# Patient Record
Sex: FEMALE | Race: BLACK OR AFRICAN AMERICAN | Employment: OTHER | URBAN - METROPOLITAN AREA
[De-identification: names, ages, dates, MRNs, and addresses within clinical notes are randomized per-mention and may not be internally consistent; named-entity substitution may affect disease eponyms.]

---

## 2013-08-30 LAB — COLONOSCOPY, EXTERNAL: NORMAL

## 2017-05-10 ENCOUNTER — OP HISTORICAL/CONVERTED ENCOUNTER (OUTPATIENT)
Dept: OTHER | Age: 82
End: 2017-05-10

## 2017-10-12 ENCOUNTER — OP HISTORICAL/CONVERTED ENCOUNTER (OUTPATIENT)
Dept: OTHER | Age: 82
End: 2017-10-12

## 2017-10-13 ENCOUNTER — OP HISTORICAL/CONVERTED ENCOUNTER (OUTPATIENT)
Dept: OTHER | Age: 82
End: 2017-10-13

## 2017-10-20 ENCOUNTER — OP HISTORICAL/CONVERTED ENCOUNTER (OUTPATIENT)
Dept: OTHER | Age: 82
End: 2017-10-20

## 2017-11-10 ENCOUNTER — OP HISTORICAL/CONVERTED ENCOUNTER (OUTPATIENT)
Dept: OTHER | Age: 82
End: 2017-11-10

## 2017-12-15 ENCOUNTER — OP HISTORICAL/CONVERTED ENCOUNTER (OUTPATIENT)
Dept: OTHER | Age: 82
End: 2017-12-15

## 2018-02-09 ENCOUNTER — OP HISTORICAL/CONVERTED ENCOUNTER (OUTPATIENT)
Dept: OTHER | Age: 83
End: 2018-02-09

## 2018-06-04 ENCOUNTER — OP HISTORICAL/CONVERTED ENCOUNTER (OUTPATIENT)
Dept: OTHER | Age: 83
End: 2018-06-04

## 2018-09-25 ENCOUNTER — OP HISTORICAL/CONVERTED ENCOUNTER (OUTPATIENT)
Dept: OTHER | Age: 83
End: 2018-09-25

## 2018-09-28 ENCOUNTER — OP HISTORICAL/CONVERTED ENCOUNTER (OUTPATIENT)
Dept: OTHER | Age: 83
End: 2018-09-28

## 2018-10-15 ENCOUNTER — OP HISTORICAL/CONVERTED ENCOUNTER (OUTPATIENT)
Dept: OTHER | Age: 83
End: 2018-10-15

## 2018-10-15 LAB — MAMMOGRAPHY, EXTERNAL: NORMAL

## 2019-10-16 ENCOUNTER — OP HISTORICAL/CONVERTED ENCOUNTER (OUTPATIENT)
Dept: OTHER | Age: 84
End: 2019-10-16

## 2019-12-02 ENCOUNTER — ED HISTORICAL/CONVERTED ENCOUNTER (OUTPATIENT)
Dept: OTHER | Age: 84
End: 2019-12-02

## 2020-09-12 DIAGNOSIS — Z12.31 OTHER SCREENING MAMMOGRAM: ICD-10-CM

## 2020-09-21 ENCOUNTER — HOSPITAL ENCOUNTER (OUTPATIENT)
Dept: MAMMOGRAPHY | Age: 85
Discharge: HOME | End: 2020-09-21
Attending: INTERNAL MEDICINE
Payer: MEDICARE

## 2020-09-21 PROCEDURE — 77067 SCR MAMMO BI INCL CAD: CPT

## 2021-02-20 ENCOUNTER — IMMUNIZATION (OUTPATIENT)
Dept: INTERNAL MEDICINE CLINIC | Age: 86
End: 2021-02-20
Payer: MEDICARE

## 2021-02-20 DIAGNOSIS — Z23 ENCOUNTER FOR IMMUNIZATION: Primary | ICD-10-CM

## 2021-02-20 PROCEDURE — 91300 COVID-19, MRNA, LNP-S, PF, 30MCG/0.3ML DOSE(PFIZER): CPT | Performed by: FAMILY MEDICINE

## 2021-02-20 PROCEDURE — 0001A COVID-19, MRNA, LNP-S, PF, 30MCG/0.3ML DOSE(PFIZER): CPT | Performed by: FAMILY MEDICINE

## 2021-03-13 ENCOUNTER — IMMUNIZATION (OUTPATIENT)
Dept: INTERNAL MEDICINE CLINIC | Age: 86
End: 2021-03-13
Payer: MEDICARE

## 2021-03-13 DIAGNOSIS — Z23 ENCOUNTER FOR IMMUNIZATION: Primary | ICD-10-CM

## 2021-03-13 PROCEDURE — 91300 COVID-19, MRNA, LNP-S, PF, 30MCG/0.3ML DOSE(PFIZER): CPT | Performed by: FAMILY MEDICINE

## 2021-03-13 PROCEDURE — 0002A COVID-19, MRNA, LNP-S, PF, 30MCG/0.3ML DOSE(PFIZER): CPT | Performed by: FAMILY MEDICINE

## 2021-03-17 ENCOUNTER — HOSPITAL ENCOUNTER (OUTPATIENT)
Dept: GENERAL RADIOLOGY | Age: 86
Discharge: HOME | End: 2021-03-17
Payer: MEDICARE

## 2021-03-17 ENCOUNTER — TRANSCRIBE ORDER (OUTPATIENT)
Dept: GENERAL RADIOLOGY | Age: 86
End: 2021-03-17

## 2021-03-17 DIAGNOSIS — M51.36 DEGENERATION, INTERVERTEBRAL DISC, LUMBAR: Primary | ICD-10-CM

## 2021-03-17 DIAGNOSIS — M51.36 DEGENERATION, INTERVERTEBRAL DISC, LUMBAR: ICD-10-CM

## 2021-03-17 PROCEDURE — 72100 X-RAY EXAM L-S SPINE 2/3 VWS: CPT

## 2021-08-04 ENCOUNTER — TRANSCRIBE ORDER (OUTPATIENT)
Dept: SCHEDULING | Age: 86
End: 2021-08-04

## 2021-08-04 DIAGNOSIS — R63.4 LOSS OF WEIGHT: ICD-10-CM

## 2021-08-04 DIAGNOSIS — D47.2 MONOCLONAL PARAPROTEINEMIA: Primary | ICD-10-CM

## 2021-10-27 ENCOUNTER — TRANSCRIBE ORDER (OUTPATIENT)
Dept: SCHEDULING | Age: 86
End: 2021-10-27

## 2021-10-27 DIAGNOSIS — R63.4 LOSS OF WEIGHT: ICD-10-CM

## 2021-10-27 DIAGNOSIS — D47.2 MONOCLONAL PARAPROTEINEMIA: Primary | ICD-10-CM

## 2021-11-02 ENCOUNTER — TRANSCRIBE ORDER (OUTPATIENT)
Dept: SCHEDULING | Age: 86
End: 2021-11-02

## 2021-11-02 DIAGNOSIS — R63.4 LOSS OF WEIGHT: ICD-10-CM

## 2021-11-02 DIAGNOSIS — D47.2 MONOCLONAL PARAPROTEINEMIA: Primary | ICD-10-CM

## 2021-11-02 DIAGNOSIS — Z85.3 PERSONAL HISTORY OF BREAST CANCER: ICD-10-CM

## 2021-11-03 ENCOUNTER — TRANSCRIBE ORDER (OUTPATIENT)
Dept: SCHEDULING | Age: 86
End: 2021-11-03

## 2021-11-03 DIAGNOSIS — R63.4 LOSS OF WEIGHT: Primary | ICD-10-CM

## 2021-11-03 DIAGNOSIS — Z85.3 PERSONAL HISTORY OF MALIGNANT NEOPLASM OF BREAST: ICD-10-CM

## 2022-01-24 ENCOUNTER — TRANSCRIBE ORDER (OUTPATIENT)
Dept: SCHEDULING | Age: 87
End: 2022-01-24

## 2022-01-24 DIAGNOSIS — R63.4 LOSS OF WEIGHT: Primary | ICD-10-CM

## 2022-01-24 DIAGNOSIS — Z85.3 PERSONAL HISTORY OF MALIGNANT NEOPLASM OF BREAST: ICD-10-CM

## 2022-01-24 DIAGNOSIS — D47.2 MONOCLONAL PARAPROTEINEMIA: ICD-10-CM

## 2022-01-28 ENCOUNTER — TRANSCRIBE ORDER (OUTPATIENT)
Dept: SCHEDULING | Age: 87
End: 2022-01-28

## 2022-01-28 DIAGNOSIS — D47.2 MONOCLONAL PARAPROTEINEMIA: ICD-10-CM

## 2022-01-28 DIAGNOSIS — R63.4 LOSS OF WEIGHT: Primary | ICD-10-CM

## 2022-01-28 DIAGNOSIS — Z85.3 PERSONAL HISTORY OF MALIGNANT NEOPLASM OF BREAST: ICD-10-CM

## 2022-02-02 ENCOUNTER — HOSPITAL ENCOUNTER (OUTPATIENT)
Dept: CT IMAGING | Age: 87
Discharge: HOME | End: 2022-02-02
Attending: INTERNAL MEDICINE
Payer: MEDICARE

## 2022-02-02 DIAGNOSIS — Z85.3 PERSONAL HISTORY OF MALIGNANT NEOPLASM OF BREAST: ICD-10-CM

## 2022-02-02 DIAGNOSIS — D47.2 MONOCLONAL PARAPROTEINEMIA: ICD-10-CM

## 2022-02-02 DIAGNOSIS — R63.4 LOSS OF WEIGHT: ICD-10-CM

## 2022-02-02 PROCEDURE — 74176 CT ABD & PELVIS W/O CONTRAST: CPT

## 2022-02-02 PROCEDURE — 71250 CT THORAX DX C-: CPT

## 2022-09-23 LAB
CREATININE, EXTERNAL: 0.91
HBA1C MFR BLD HPLC: 5.8 %
LDL-C, EXTERNAL: 95
TOTAL CHOLESTEROL, NCHOLT: 170

## 2023-02-15 DIAGNOSIS — S82.434D CLOSED NONDISPLACED OBLIQUE FRACTURE OF SHAFT OF RIGHT FIBULA WITH ROUTINE HEALING, SUBSEQUENT ENCOUNTER: ICD-10-CM

## 2023-02-15 DIAGNOSIS — M54.31 SCIATICA OF RIGHT SIDE: ICD-10-CM

## 2023-02-15 DIAGNOSIS — E05.90 HYPERTHYROIDISM: ICD-10-CM

## 2023-02-15 DIAGNOSIS — D47.2 SMOLDERING MULTIPLE MYELOMA: ICD-10-CM

## 2023-02-15 DIAGNOSIS — D64.9 MILD ANEMIA: ICD-10-CM

## 2023-02-15 DIAGNOSIS — I10 BENIGN ESSENTIAL HYPERTENSION: Primary | ICD-10-CM

## 2023-02-15 PROBLEM — C50.919 BREAST CANCER (HCC): Status: ACTIVE | Noted: 2023-02-15

## 2023-02-15 RX ORDER — ASPIRIN 81 MG/1
81 TABLET ORAL DAILY
COMMUNITY

## 2023-02-15 RX ORDER — SIMVASTATIN 10 MG/1
10 TABLET, FILM COATED ORAL
COMMUNITY

## 2023-02-15 RX ORDER — MULTIVIT WITH MINERALS/HERBS
1 TABLET ORAL DAILY
COMMUNITY

## 2023-02-15 RX ORDER — LEVOTHYROXINE SODIUM 175 UG/1
175 TABLET ORAL
COMMUNITY

## 2023-02-15 RX ORDER — DORZOLAMIDE HCL 20 MG/ML
2 SOLUTION/ DROPS OPHTHALMIC DAILY
COMMUNITY

## 2023-02-15 RX ORDER — IRBESARTAN AND HYDROCHLOROTHIAZIDE 150; 12.5 MG/1; MG/1
1 TABLET, FILM COATED ORAL DAILY
COMMUNITY

## 2023-02-15 RX ORDER — TRAVOPROST OPHTHALMIC SOLUTION 0.04 MG/ML
1 SOLUTION OPHTHALMIC EVERY EVENING
COMMUNITY

## 2023-02-15 RX ORDER — DIPHENHYDRAMINE HCL 25 MG
25 TABLET ORAL AS NEEDED
COMMUNITY

## 2023-02-15 RX ORDER — FERROUS SULFATE 324(65)MG
324 TABLET, DELAYED RELEASE (ENTERIC COATED) ORAL
COMMUNITY

## 2023-02-15 RX ORDER — AMLODIPINE BESYLATE 5 MG/1
5 TABLET ORAL DAILY
COMMUNITY

## 2023-02-15 RX ORDER — TIMOLOL MALEATE 6.8 MG/ML
1 SOLUTION/ DROPS OPHTHALMIC 2 TIMES DAILY
COMMUNITY

## 2023-02-15 RX ORDER — CLONIDINE HYDROCHLORIDE 0.1 MG/1
0.1 TABLET ORAL
COMMUNITY

## 2023-02-15 RX ORDER — DOCUSATE SODIUM 100 MG/1
100 CAPSULE, LIQUID FILLED ORAL AS NEEDED
COMMUNITY

## 2023-02-15 RX ORDER — ASCORBIC ACID 500 MG
500 TABLET ORAL DAILY
COMMUNITY

## 2023-02-15 RX ORDER — LIDOCAINE 50 MG/G
1 PATCH TOPICAL EVERY 12 HOURS
COMMUNITY

## 2023-02-15 RX ORDER — GABAPENTIN 300 MG/1
300 CAPSULE ORAL
COMMUNITY

## 2023-02-15 RX ORDER — CALCIUM CARBONATE 600 MG
600 TABLET ORAL 2 TIMES DAILY
COMMUNITY

## 2023-02-15 RX ORDER — ACETAMINOPHEN 325 MG/1
325 TABLET ORAL AS NEEDED
COMMUNITY

## 2023-04-01 DIAGNOSIS — R79.89 ELEVATED SERUM CREATININE: Primary | ICD-10-CM

## 2023-04-01 DIAGNOSIS — E03.9 HYPOTHYROIDISM, ADULT: ICD-10-CM

## 2023-04-01 DIAGNOSIS — K59.00 CONSTIPATION IN FEMALE: ICD-10-CM

## 2023-04-01 DIAGNOSIS — E78.00 HYPERCHOLESTEREMIA: ICD-10-CM

## 2023-04-01 DIAGNOSIS — R73.09 ELEVATED GLUCOSE: ICD-10-CM

## 2023-04-01 RX ORDER — ACETAMINOPHEN 500 MG
2000 TABLET ORAL DAILY
COMMUNITY

## 2023-04-04 ENCOUNTER — OFFICE VISIT (OUTPATIENT)
Dept: INTERNAL MEDICINE CLINIC | Age: 88
End: 2023-04-04
Payer: MEDICARE

## 2023-04-04 PROCEDURE — G8427 DOCREV CUR MEDS BY ELIG CLIN: HCPCS | Performed by: INTERNAL MEDICINE

## 2023-04-04 PROCEDURE — G8420 CALC BMI NORM PARAMETERS: HCPCS | Performed by: INTERNAL MEDICINE

## 2023-04-04 PROCEDURE — 1123F ACP DISCUSS/DSCN MKR DOCD: CPT | Performed by: INTERNAL MEDICINE

## 2023-04-04 PROCEDURE — 99214 OFFICE O/P EST MOD 30 MIN: CPT | Performed by: INTERNAL MEDICINE

## 2023-04-04 PROCEDURE — G8536 NO DOC ELDER MAL SCRN: HCPCS | Performed by: INTERNAL MEDICINE

## 2023-04-04 PROCEDURE — 1101F PT FALLS ASSESS-DOCD LE1/YR: CPT | Performed by: INTERNAL MEDICINE

## 2023-04-04 PROCEDURE — G8510 SCR DEP NEG, NO PLAN REQD: HCPCS | Performed by: INTERNAL MEDICINE

## 2023-04-04 PROCEDURE — 1090F PRES/ABSN URINE INCON ASSESS: CPT | Performed by: INTERNAL MEDICINE

## 2023-04-04 NOTE — PROGRESS NOTES
[address] [facility] Internal Medicine  [address]  [place], [address] [facility]  Phone: [phone]      Xavier Ernst (: 1931) is a 80 y.o. female, established patient, here for evaluation of the following chief complaint(s):  Follow Up Chronic Condition         SUBJECTIVE/OBJECTIVE:  HPI:  Patient is here for follow up, she did not have routine blood work for this visit. She states that she is doing well and she needs refills on her medicines sent to Mail order. She has her grandson Nida Newell and her daughter Brad Rojas with her today. Just informed she is trying to get some help at home for his mother and grandmother from the [address] and they will come for an evaluation tomorrow. Patient denies any falls. She stated she is taking all the medications but did not bring the medicines today. She states that she has not seen  recently. Prior to Admission medications    Medication Sig Start Date End Date Taking? Authorizing Provider   cholecalciferol (VITAMIN D3) (2,000 UNITS /50 MCG) cap capsule Take 2,000 Units by mouth daily. Yes Provider, Historical   polyvinyl alcohol-povidon,PF, (REFRESH CLASSIC) 1.4-0.6 % ophthalmic solution Administer 1-2 Drops to both eyes as needed. Yes Provider, Historical   cloNIDine HCL (CATAPRES) 0.1 mg tablet Take 0.1 mg by mouth nightly. Yes Provider, Historical   aspirin delayed-release 81 mg tablet Take 81 mg by mouth daily. Yes Provider, Historical   calcium carbonate (CALTREX) 600 mg calcium (1,500 mg) tablet Take 600 mg by mouth two (2) times a day. Yes Provider, Historical   amLODIPine (NORVASC) 5 mg tablet Take 5 mg by mouth daily. Yes Provider, Historical   lidocaine (LIDODERM) 5 % 1 Patch by TransDERmal route every twelve (12) hours. Apply patch to the affected area for 12 hours a day and remove for 12 hours a day.    Yes Provider, Historical   levothyroxine (SYNTHROID) 175 mcg tablet Take 175 mcg by mouth Daily (before breakfast). Yes Provider, Historical   irbesartan-hydroCHLOROthiazide (AVALIDE) 150-12.5 mg per tablet Take 1 Tablet by mouth daily. Yes Provider, Historical   travoprost (TRAVATAN Z) 0.004 % ophthalmic solution Administer 1 Drop to both eyes every evening. Yes Provider, Historical   b complex vitamins tablet Take 1 Tablet by mouth daily. Yes Provider, Historical   timoloL maleate 0.5 % drpd ophthalmic solution Administer 1 Drop to left eye two (2) times a day. Yes Provider, Historical   gabapentin (NEURONTIN) 300 mg capsule Take 300 mg by mouth nightly. Yes Provider, Historical   acetaminophen (TYLENOL) 325 mg tablet Take 325 mg by mouth as needed for Pain. Yes Provider, Historical   ascorbic acid, vitamin C, (VITAMIN C) 500 mg tablet Take 500 mg by mouth daily. Yes Provider, Historical   vit C/E/Zn/coppr/lutein/zeaxan (PRESERVISION AREDS-2 PO) Take  by mouth daily. Yes Provider, Historical   dorzolamide (TRUSOPT) 2 % ophthalmic solution Administer 2 Drops to both eyes daily. 1 drop in 1 eye daily and 2 drops in other eye twice a day   Yes Provider, Historical   POTASSIUM-99 PO Take 90 mg by mouth daily. Yes Provider, Historical   simvastatin (ZOCOR) 10 mg tablet Take 10 mg by mouth. 1 tablet Monday, Wednesday, and Friday   Yes Provider, Historical   ferrous sulfate 324 mg (65 mg iron) tablet Take 324 mg by mouth Daily (before breakfast). Yes Provider, Historical   docusate sodium (COLACE) 100 mg capsule Take 100 mg by mouth as needed for Constipation. Yes Provider, Historical   diphenhydrAMINE (BENADRYL) 25 mg tablet Take 25 mg by mouth as needed.    Yes Provider, Historical   furosemide (LASIX) 20 mg tablet TAKE 1 TABLET BY MOUTH  DAILY 2/4/23  Yes Vicki Martinez MD        Allergies   Allergen Reactions    Seafood Hives        Past Medical History:   Diagnosis Date    Benign essential hypertension     Breast cancer ([facility] [address]     left side-2005    Closed nondisplaced oblique fracture of shaft of right fibula with routine healing, subsequent encounter     Constipation in female     Elevated glucose     Elevated serum creatinine     Hypercholesteremia     Hyperthyroidism     Hypothyroidism, adult     Mild anemia     Sciatica of right side     Smoldering multiple myeloma         Family History   Problem Relation Age of Onset    Stroke Father     Heart Attack Maternal Grandmother     Cancer Maternal Grandfather         bone    Colon Cancer Son         Past Surgical History:   Procedure Laterality Date    HX CYST REMOVAL      2/2004    HX HYSTERECTOMY      age 76    HX MASTECTOMY Left     2005    IR BONE MARROW DIAGNOSTIC ASP  12/15/2017    Dr. Opal Woods       Review of Systems  Constitutional:  Negative for chills and fever. HENT:  Negative for congestion, ear pain, nosebleeds, sinus pain, sore throat and tinnitus. Eyes:  Negative for redness. Respiratory:  Negative for cough and shortness of breath. Cardiovascular:  Negative for chest pain and palpitations. Gastrointestinal:  Negative for abdominal pain, diarrhea, nausea and vomiting. Endocrine: Negative for cold intolerance and polyuria. Genitourinary:  Negative for dysuria and hematuria. Musculoskeletal:  Negative for back pain and neck pain. Skin:  Negative for rash. Neurological:  Negative for dizziness and headaches. Psychiatric/Behavioral: Negative. BP (!) 180/100   Pulse 66   Temp 97.1 °F (36.2 °C) (Temporal)   Ht 5' 7\" (1.702 m)   Wt 131 lb (59.4 kg)   SpO2 99%   BMI 20.52 kg/m²      Physical Exam  Constitutional:       Appearance: Patient is with her daughter Luna Ortiz and grandson Greyson Grayson. HENT:      Head: Normocephalic and atraumatic. Right Ear: External ear normal.      Left Ear: External ear normal.      Nose: Nose normal.      Mouth/Throat:      Mouth: Mucous membranes are moist.   Eyes:      Extraocular Movements: Extraocular movements intact. Pupils: Pupils are equal, round, and reactive to light. Cardiovascular:      Rate and Rhythm: Normal rate and regular rhythm. Pulmonary:      Effort: Pulmonary effort is normal.      Breath sounds: Normal breath sounds. Abdominal:      Palpations: Abdomen is soft. Tenderness: There is no abdominal tenderness. Musculoskeletal:      Cervical back: Normal range of motion and neck supple. Right lower leg: Nonpitting edema. Left lower leg: No edema. Skin:     General: Skin is warm and dry. Neurological:      General: No focal deficit present. Mental Status: She is alert and oriented to person, place, and time. Psychiatric:         Mood and Affect: Mood normal.    ASSESSMENT/PLAN:  Below is the assessment and plan developed based on review of pertinent history, physical exam, labs, studies, and medications. 1. Hypothyroidism, adult  Comments:  TSH was high at 85 in July 22, after adjusting the medication TSH was 0.011 in September 22 and 0.048 in November 22. Advised to do blood test and new lab order given and just informed he will assist with the blood test.  Advised to continue levothyroxine and  will adjust the medication if needed after the blood test.  Orders:  -     TSH 3RD GENERATION; Future  -     TSH 3RD GENERATION; Future  2. Hypercholesteremia  Comments:  Cholesterol was  controlled and LFTs were normal in September 22, advised to continue low-fat diet simvastatin and will recheck labs  Orders:  -     LIPID PANEL; Future  -     METABOLIC PANEL, COMPREHENSIVE; Future  -     LIPID PANEL; Future  3. Elevated serum creatinine  Comments:  Creatinine was normal at 0.91 in September 22, advised to keep fluid intake, to avoid Aleve, Motrin, naproxen  Orders:  -     METABOLIC PANEL, COMPREHENSIVE; Future  4. Hyperthyroidism  Comments:  TSH was low at 0.011 in September 22 and 0.0 418 #22 likely to replacement, levothyroxine dose has been adjusted, will recheck labs  5.  Mild anemia  Comments:  Has  history of monoclonal gammopathy with smoldering myeloma, advised to follow-up with . 6. Essential hypertension  Comments:  Blood pressure is elevated at the office, patient states usually her blood pressure is fair at home and when she comes to doctor's office it goes up. Advised low-sodium diet, to to call with the name of the medications what he is she is taking and will adjust the medication. 7. Smoldering multiple myeloma  Comments:  Advised to keep follow-up with Juan Guevara in about 4 months (around 8/4/2023). There are no Patient Instructions on file for this visit. Health Maintenance Due   Topic Date Due    Shingles Vaccine (1 of 2) Never done    DTaP/Tdap/Td series (1 - Tdap) Never done    Bone Densitometry (Dexa) Screening  Never done    Medicare Yearly Exam  Never done        Aspects of this note may have been generated using voice recognition software. Despite editing, there may be unrecognized errors. An electronic signature was used to authenticate this note.   -- Parmjit Ag MD

## 2023-04-04 NOTE — PROGRESS NOTES
Chief Complaint   Patient presents with    Follow Up Chronic Condition     1. \"Have you been to the ER, urgent care clinic since your last visit? Hospitalized since your last visit? \" No    2. \"Have you seen or consulted any other health care providers outside of the [address] [place] [address] since your last visit? \" No     3. For patients aged 39-70: Has the patient had a colonoscopy / FIT/ Cologuard? NA - based on age      If the patient is female:    4. For patients aged 41-77: Has the patient had a mammogram within the past 2 years? NA - based on age or sex      11. For patients aged 21-65: Has the patient had a pap smear?  NA - based on age or sex

## 2023-04-23 DIAGNOSIS — E78.00 HYPERCHOLESTEREMIA: Primary | ICD-10-CM

## 2023-04-24 DIAGNOSIS — E78.00 HYPERCHOLESTEREMIA: Primary | ICD-10-CM

## 2023-04-24 DIAGNOSIS — E03.9 HYPOTHYROIDISM, ADULT: Primary | ICD-10-CM

## 2023-04-27 RX ORDER — CLONIDINE HYDROCHLORIDE 0.1 MG/1
0.1 TABLET ORAL
Qty: 90 TABLET | Refills: 0 | Status: SHIPPED | OUTPATIENT
Start: 2023-04-27

## 2023-04-27 RX ORDER — AMLODIPINE BESYLATE 5 MG/1
5 TABLET ORAL DAILY
Qty: 90 TABLET | Refills: 0 | Status: SHIPPED | OUTPATIENT
Start: 2023-04-27

## 2023-04-27 RX ORDER — SIMVASTATIN 10 MG/1
10 TABLET, FILM COATED ORAL EVERY OTHER DAY
Qty: 90 TABLET | Refills: 0 | Status: SHIPPED | OUTPATIENT
Start: 2023-04-27

## 2023-04-27 RX ORDER — IRBESARTAN AND HYDROCHLOROTHIAZIDE 150; 12.5 MG/1; MG/1
1 TABLET, FILM COATED ORAL DAILY
Qty: 90 TABLET | Refills: 0 | Status: SHIPPED | OUTPATIENT
Start: 2023-04-27

## 2023-06-20 ENCOUNTER — HOSPITAL ENCOUNTER (OUTPATIENT)
Facility: HOSPITAL | Age: 88
Discharge: HOME | End: 2023-06-23
Attending: INTERNAL MEDICINE
Payer: MEDICARE

## 2023-06-20 DIAGNOSIS — R63.4 LOSS OF WEIGHT: ICD-10-CM

## 2023-06-20 DIAGNOSIS — Z85.3 PERSONAL HISTORY OF MALIGNANT NEOPLASM OF BREAST: ICD-10-CM

## 2023-06-20 DIAGNOSIS — D47.2 MONOCLONAL PARAPROTEINEMIA: ICD-10-CM

## 2023-06-20 PROCEDURE — 74176 CT ABD & PELVIS W/O CONTRAST: CPT

## 2023-06-28 RX ORDER — AMLODIPINE BESYLATE 5 MG/1
5 TABLET ORAL DAILY
Qty: 90 TABLET | Refills: 1 | Status: ON HOLD | OUTPATIENT
Start: 2023-06-28 | End: 2023-08-22 | Stop reason: SDUPTHER

## 2023-06-28 RX ORDER — CLONIDINE HYDROCHLORIDE 0.1 MG/1
TABLET ORAL
Qty: 90 TABLET | Refills: 1 | Status: SHIPPED | OUTPATIENT
Start: 2023-06-28

## 2023-06-28 RX ORDER — IRBESARTAN AND HYDROCHLOROTHIAZIDE 150; 12.5 MG/1; MG/1
1 TABLET, FILM COATED ORAL DAILY
Qty: 90 TABLET | Refills: 1 | Status: ON HOLD | OUTPATIENT
Start: 2023-06-28 | End: 2023-08-22 | Stop reason: HOSPADM

## 2023-07-05 ENCOUNTER — TELEPHONE (OUTPATIENT)
Facility: CLINIC | Age: 88
End: 2023-07-05

## 2023-07-05 NOTE — TELEPHONE ENCOUNTER
Need advice on next steps to address fainting/dizziness spells. My grandmother started feeling shaky this afternoon and fell coming down the living room steps. We are giving her fluids and monitoring her blood pressure but would like an appointment in the morning if possible. She has also mentioned \"blacking out\" and feeling like the halllway was crashing in on her in the past week.

## 2023-07-05 NOTE — TELEPHONE ENCOUNTER
----- Message from Richard Martinez sent at 7/5/2023  5:41 PM EDT -----  Regarding: Fainting spells  Contact: [phone]  Clarifying that the prior message was sent on behalf of Sandra Patino by her granddaughter, Caio Barraza. Was asked by my cousin Parisa Marie) to send this note so Dr. Luke Wylie is aware. Thank you.

## 2023-07-05 NOTE — TELEPHONE ENCOUNTER
----- Message from Efrain Izquierdo sent at 7/5/2023  5:41 PM EDT -----  Regarding: Fainting spells  Contact: [phone]  Clarifying that the prior message was sent on behalf of [employer] by her granddaughter, Elva Figueredo. Was asked by my cousin Mallory Trevizo) to send this note so Dr. Georgia Ribera is aware. Thank you.

## 2023-07-06 ENCOUNTER — OFFICE VISIT (OUTPATIENT)
Facility: CLINIC | Age: 88
End: 2023-07-06
Payer: MEDICARE

## 2023-07-06 VITALS
HEART RATE: 71 BPM | TEMPERATURE: 97.6 F | OXYGEN SATURATION: 99 % | WEIGHT: 124 LBS | DIASTOLIC BLOOD PRESSURE: 90 MMHG | HEIGHT: 66 IN | SYSTOLIC BLOOD PRESSURE: 158 MMHG | BODY MASS INDEX: 19.93 KG/M2

## 2023-07-06 DIAGNOSIS — Z91.81 AT HIGH RISK FOR FALLS: ICD-10-CM

## 2023-07-06 DIAGNOSIS — E03.9 HYPOTHYROIDISM, ADULT: ICD-10-CM

## 2023-07-06 DIAGNOSIS — R55 PRE-SYNCOPE: Primary | ICD-10-CM

## 2023-07-06 DIAGNOSIS — L30.9 DERMATITIS: ICD-10-CM
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
## 2023-07-06 DIAGNOSIS — I10 ESSENTIAL HYPERTENSION, BENIGN: ICD-10-CM

## 2023-07-06 PROCEDURE — 1036F TOBACCO NON-USER: CPT | Performed by: INTERNAL MEDICINE

## 2023-07-06 PROCEDURE — 1123F ACP DISCUSS/DSCN MKR DOCD: CPT | Performed by: INTERNAL MEDICINE

## 2023-07-06 PROCEDURE — 93880 EXTRACRANIAL BILAT STUDY: CPT | Performed by: INTERNAL MEDICINE

## 2023-07-06 PROCEDURE — G8420 CALC BMI NORM PARAMETERS: HCPCS | Performed by: INTERNAL MEDICINE

## 2023-07-06 PROCEDURE — G8427 DOCREV CUR MEDS BY ELIG CLIN: HCPCS | Performed by: INTERNAL MEDICINE

## 2023-07-06 PROCEDURE — 1090F PRES/ABSN URINE INCON ASSESS: CPT | Performed by: INTERNAL MEDICINE

## 2023-07-06 PROCEDURE — 99214 OFFICE O/P EST MOD 30 MIN: CPT | Performed by: INTERNAL MEDICINE

## 2023-07-06 RX ORDER — MULTIVIT,CALC,MINS/IRON/FOLIC 9MG-400MCG
TABLET ORAL
Qty: 2 EACH | Refills: 3 | Status: SHIPPED | OUTPATIENT
Start: 2023-07-06

## 2023-07-06 RX ORDER — LEVOTHYROXINE SODIUM 175 UG/1
TABLET ORAL
Qty: 90 TABLET | Refills: 0 | Status: SHIPPED | OUTPATIENT
Start: 2023-07-06

## 2023-07-06 SDOH — ECONOMIC STABILITY: INCOME INSECURITY: HOW HARD IS IT FOR YOU TO PAY FOR THE VERY BASICS LIKE FOOD, HOUSING, MEDICAL CARE, AND HEATING?: NOT HARD AT ALL

## 2023-07-06 SDOH — ECONOMIC STABILITY: HOUSING INSECURITY
IN THE LAST 12 MONTHS, WAS THERE A TIME WHEN YOU DID NOT HAVE A STEADY PLACE TO SLEEP OR SLEPT IN A SHELTER (INCLUDING NOW)?: NO

## 2023-07-06 SDOH — ECONOMIC STABILITY: FOOD INSECURITY: WITHIN THE PAST 12 MONTHS, THE FOOD YOU BOUGHT JUST DIDN'T LAST AND YOU DIDN'T HAVE MONEY TO GET MORE.: NEVER TRUE

## 2023-07-06 SDOH — ECONOMIC STABILITY: FOOD INSECURITY: WITHIN THE PAST 12 MONTHS, YOU WORRIED THAT YOUR FOOD WOULD RUN OUT BEFORE YOU GOT MONEY TO BUY MORE.: NEVER TRUE

## 2023-07-06 ASSESSMENT — ANXIETY QUESTIONNAIRES
3. WORRYING TOO MUCH ABOUT DIFFERENT THINGS: 3
7. FEELING AFRAID AS IF SOMETHING AWFUL MIGHT HAPPEN: 3
1. FEELING NERVOUS, ANXIOUS, OR ON EDGE: 3
6. BECOMING EASILY ANNOYED OR IRRITABLE: 0
4. TROUBLE RELAXING: 3
GAD7 TOTAL SCORE: 18
5. BEING SO RESTLESS THAT IT IS HARD TO SIT STILL: 3
2. NOT BEING ABLE TO STOP OR CONTROL WORRYING: 3
IF YOU CHECKED OFF ANY PROBLEMS ON THIS QUESTIONNAIRE, HOW DIFFICULT HAVE THESE PROBLEMS MADE IT FOR YOU TO DO YOUR WORK, TAKE CARE OF THINGS AT HOME, OR GET ALONG WITH OTHER PEOPLE: NOT DIFFICULT AT ALL

## 2023-07-06 ASSESSMENT — PATIENT HEALTH QUESTIONNAIRE - PHQ9
SUM OF ALL RESPONSES TO PHQ QUESTIONS 1-9: 0
2. FEELING DOWN, DEPRESSED OR HOPELESS: 0
1. LITTLE INTEREST OR PLEASURE IN DOING THINGS: 0
SUM OF ALL RESPONSES TO PHQ QUESTIONS 1-9: 0
SUM OF ALL RESPONSES TO PHQ QUESTIONS 1-9: 0
SUM OF ALL RESPONSES TO PHQ9 QUESTIONS 1 & 2: 0
SUM OF ALL RESPONSES TO PHQ QUESTIONS 1-9: 0

## 2023-07-06 NOTE — PROGRESS NOTES
No chief complaint on file. 1. \"Have you been to the ER, urgent care clinic since your last visit? Hospitalized since your last visit? \" No    2. \"Have you seen or consulted any other health care providers outside of the [address] [place] [address] since your last visit? \" No     3. For patients aged 43-73: Has the patient had a colonoscopy / FIT/ Cologuard? NA - based on age      If the patient is female:    4. For patients aged 43-66: Has the patient had a mammogram within the past 2 years? NA - based on age or sex      11. For patients aged 21-65: Has the patient had a pap smear?  NA - based on age or sex
Maintenance Due   Topic Date Due    DTaP/Tdap/Td vaccine (1 - Tdap) Never done    Shingles vaccine (1 of 2) Never done    Annual Wellness Visit (AWV)  Never done    COVID-19 Vaccine (6 - Booster for Pfizer series) 01/17/2023        Aspects of this note may have been generated using voice recognition software. Despite editing, there may be unrecognized errors. An electronic signature was used to authenticate this note.   -- Batsheva Ricks MD

## 2023-07-20 ENCOUNTER — TELEPHONE (OUTPATIENT)
Facility: CLINIC | Age: 88
End: 2023-07-20

## 2023-07-20 NOTE — TELEPHONE ENCOUNTER
----- Message from Dina Darby sent at 2023  2:20 AM EDT -----  [address]  Contact: [phone]  Dr. Dolores Martinez:    I hope this finds you well. This is Dilshad Rawls, Julee's grandson. She's still continuing to experience some rather serious dizzy spells, which I would like her to get addressed as soon as possible. We wanted to schedule her an appointment at the hospital, but they said they had no record of her referral for the scan. My family has attempted to call your office, but with no response as of yet. Would it be possible to get that referral sent over today so we can schedule that appointment? Thank you so much for this. I look forward to your response. Take care and talk soon!

## 2023-07-20 NOTE — TELEPHONE ENCOUNTER
Dr. Nicko Rivero:     I hope this finds you well. This is Alisson MelgarKevinJulee's grandson. She's still continuing to experience some rather serious dizzy spells, which I would like her to get addressed as soon as possible. We wanted to schedule her an appointment at the hospital, but they said they had no record of her referral for the scan. My family has attempted to call your office, but with no response as of yet. Would it be possible to get that referral sent over today so we can schedule that appointment? Thank you so much for this. I look forward to your response. Take care and talk soon!

## 2023-07-21 DIAGNOSIS — R55 PRE-SYNCOPE: Primary | ICD-10-CM

## 2023-07-21 DIAGNOSIS — R42 DIZZINESS: ICD-10-CM

## 2023-07-27 ENCOUNTER — HOSPITAL ENCOUNTER (OUTPATIENT)
Facility: HOSPITAL | Age: 88
Discharge: HOME | End: 2023-07-29
Attending: INTERNAL MEDICINE
Payer: MEDICARE

## 2023-07-27 DIAGNOSIS — R55 PRE-SYNCOPE: ICD-10-CM

## 2023-07-27 DIAGNOSIS — R42 DIZZINESS: ICD-10-CM

## 2023-07-27 PROCEDURE — 93225 XTRNL ECG REC<48 HRS REC: CPT

## 2023-07-28 ENCOUNTER — HOSPITAL ENCOUNTER (OUTPATIENT)
Facility: HOSPITAL | Age: 88
End: 2023-07-28
Attending: INTERNAL MEDICINE
Payer: MEDICARE

## 2023-07-28 DIAGNOSIS — R55 PRE-SYNCOPE: ICD-10-CM

## 2023-07-28 DIAGNOSIS — R42 DIZZINESS: ICD-10-CM

## 2023-07-28 LAB
VAS LEFT CCA DIST EDV: 9.2 CM/S
VAS LEFT CCA DIST PSV: 92.4 CM/S
VAS LEFT CCA PROX EDV: 6.1 CM/S
VAS LEFT CCA PROX PSV: 102 CM/S
VAS LEFT ECA EDV: 0 CM/S
VAS LEFT ECA PSV: 87.1 CM/S
VAS LEFT ICA DIST EDV: 26.7 CM/S
VAS LEFT ICA DIST PSV: 121 CM/S
VAS LEFT ICA PROX EDV: 6.9 CM/S
VAS LEFT ICA PROX PSV: 60.3 CM/S
VAS LEFT ICA/CCA PSV: 0.65
VAS LEFT SUBCLAVIAN PROX EDV: 0 CM/S
VAS LEFT SUBCLAVIAN PROX PSV: 85.5 CM/S
VAS LEFT VERTEBRAL EDV: 0 CM/S
VAS LEFT VERTEBRAL PSV: 80.2 CM/S
VAS RIGHT ARM BP: 166 MMHG
VAS RIGHT CCA DIST EDV: 0.6 CM/S
VAS RIGHT CCA DIST PSV: 66.3 CM/S
VAS RIGHT CCA PROX EDV: 3.4 CM/S
VAS RIGHT CCA PROX PSV: 93.7 CM/S
VAS RIGHT ECA EDV: 0 CM/S
VAS RIGHT ECA PSV: 123 CM/S
VAS RIGHT ICA DIST EDV: 6.7 CM/S
VAS RIGHT ICA DIST PSV: 65.9 CM/S
VAS RIGHT ICA PROX EDV: 6.6 CM/S
VAS RIGHT ICA PROX PSV: 50.7 CM/S
VAS RIGHT ICA/CCA PSV: 0.76
VAS RIGHT SUBCLAVIAN PROX EDV: 0 CM/S
VAS RIGHT SUBCLAVIAN PROX PSV: 137 CM/S
VAS RIGHT VERTEBRAL EDV: 6.19 CM/S
VAS RIGHT VERTEBRAL PSV: 45.4 CM/S

## 2023-07-28 PROCEDURE — 93880 EXTRACRANIAL BILAT STUDY: CPT | Performed by: SURGERY

## 2023-07-28 PROCEDURE — 93880 EXTRACRANIAL BILAT STUDY: CPT

## 2023-07-31 PROBLEM — R55 PRE-SYNCOPE: Status: ACTIVE | Noted: 2023-07-31

## 2023-08-01 ENCOUNTER — HOSPITAL ENCOUNTER (OUTPATIENT)
Facility: HOSPITAL | Age: 88
Discharge: HOME | End: 2023-08-04
Attending: INTERNAL MEDICINE
Payer: MEDICARE

## 2023-08-01 DIAGNOSIS — R42 DIZZINESS: ICD-10-CM

## 2023-08-01 DIAGNOSIS — R55 PRE-SYNCOPE: ICD-10-CM

## 2023-08-01 PROCEDURE — 70450 CT HEAD/BRAIN W/O DYE: CPT

## 2023-08-04 DIAGNOSIS — E78.00 HYPERCHOLESTEREMIA: ICD-10-CM

## 2023-08-04 DIAGNOSIS — E03.9 HYPOTHYROIDISM, ADULT: ICD-10-CM

## 2023-08-08 ENCOUNTER — OFFICE VISIT (OUTPATIENT)
Facility: CLINIC | Age: 88
End: 2023-08-08
Payer: MEDICARE

## 2023-08-08 VITALS
BODY MASS INDEX: 19.93 KG/M2 | DIASTOLIC BLOOD PRESSURE: 72 MMHG | HEART RATE: 66 BPM | RESPIRATION RATE: 16 BRPM | OXYGEN SATURATION: 99 % | WEIGHT: 124 LBS | HEIGHT: 66 IN | SYSTOLIC BLOOD PRESSURE: 168 MMHG | TEMPERATURE: 97.5 F

## 2023-08-08 DIAGNOSIS — R55 NEAR SYNCOPE: Primary | ICD-10-CM

## 2023-08-08 DIAGNOSIS — E78.00 HYPERCHOLESTEREMIA: ICD-10-CM

## 2023-08-08 DIAGNOSIS — R53.1 GENERALIZED WEAKNESS: ICD-10-CM

## 2023-08-08 DIAGNOSIS — I10 ESSENTIAL HYPERTENSION, BENIGN: ICD-10-CM

## 2023-08-08 DIAGNOSIS — R94.31 ABNORMAL HOLTER EXAM: ICD-10-CM

## 2023-08-08 DIAGNOSIS — R55 NEAR SYNCOPE: ICD-10-CM

## 2023-08-08 DIAGNOSIS — E03.9 HYPOTHYROIDISM, ADULT: ICD-10-CM

## 2023-08-08 PROCEDURE — G8420 CALC BMI NORM PARAMETERS: HCPCS | Performed by: INTERNAL MEDICINE

## 2023-08-08 PROCEDURE — 1036F TOBACCO NON-USER: CPT | Performed by: INTERNAL MEDICINE

## 2023-08-08 PROCEDURE — 99214 OFFICE O/P EST MOD 30 MIN: CPT | Performed by: INTERNAL MEDICINE

## 2023-08-08 PROCEDURE — 1090F PRES/ABSN URINE INCON ASSESS: CPT | Performed by: INTERNAL MEDICINE

## 2023-08-08 PROCEDURE — G8427 DOCREV CUR MEDS BY ELIG CLIN: HCPCS | Performed by: INTERNAL MEDICINE

## 2023-08-08 PROCEDURE — 1123F ACP DISCUSS/DSCN MKR DOCD: CPT | Performed by: INTERNAL MEDICINE

## 2023-08-08 RX ORDER — LEVOTHYROXINE SODIUM 0.2 MG/1
200 TABLET ORAL DAILY
COMMUNITY
Start: 2023-07-14 | End: 2023-08-08 | Stop reason: DRUGHIGH

## 2023-08-08 RX ORDER — PIMECROLIMUS 10 MG/G
CREAM TOPICAL
COMMUNITY
Start: 2023-07-12

## 2023-08-08 NOTE — PROGRESS NOTES
the past 2 years? NA - based on age or sex      11. For patients aged 21-65: Has the patient had a pap smear?  NA - based on age or sex

## 2023-08-19 ENCOUNTER — APPOINTMENT (OUTPATIENT)
Facility: HOSPITAL | Age: 88
DRG: 310 | End: 2023-08-19
Payer: MEDICARE

## 2023-08-19 ENCOUNTER — HOSPITAL ENCOUNTER (INPATIENT)
Facility: HOSPITAL | Age: 88
LOS: 1 days | Discharge: HOME HEALTH | DRG: 310 | End: 2023-08-22
Attending: EMERGENCY MEDICINE | Admitting: INTERNAL MEDICINE
Payer: MEDICARE

## 2023-08-19 DIAGNOSIS — I48.0 PAROXYSMAL ATRIAL FIBRILLATION (HCC): Primary | ICD-10-CM

## 2023-08-19 DIAGNOSIS — R55 SYNCOPE AND COLLAPSE: ICD-10-CM

## 2023-08-19 LAB
ALBUMIN SERPL-MCNC: 3 G/DL (ref 3.5–5)
ALBUMIN/GLOB SERPL: 0.6 (ref 1.1–2.2)
ALP SERPL-CCNC: 81 U/L (ref 45–117)
ALT SERPL-CCNC: 14 U/L (ref 12–78)
ANION GAP SERPL CALC-SCNC: 5 MMOL/L (ref 5–15)
APPEARANCE UR: CLEAR
AST SERPL W P-5'-P-CCNC: 11 U/L (ref 15–37)
BACTERIA URNS QL MICRO: NEGATIVE /HPF
BASOPHILS # BLD: 0 K/UL (ref 0–0.1)
BASOPHILS NFR BLD: 0 % (ref 0–1)
BILIRUB SERPL-MCNC: 0.4 MG/DL (ref 0.2–1)
BILIRUB UR QL: NEGATIVE
BUN SERPL-MCNC: 20 MG/DL (ref 6–20)
BUN/CREAT SERPL: 18 (ref 12–20)
CA-I BLD-MCNC: 9.1 MG/DL (ref 8.5–10.1)
CHLORIDE SERPL-SCNC: 106 MMOL/L (ref 97–108)
CO2 SERPL-SCNC: 28 MMOL/L (ref 21–32)
COLOR UR: ABNORMAL
CREAT SERPL-MCNC: 1.11 MG/DL (ref 0.55–1.02)
DIFFERENTIAL METHOD BLD: NORMAL
EOSINOPHIL # BLD: 0.1 K/UL (ref 0–0.4)
EOSINOPHIL NFR BLD: 1 % (ref 0–7)
EPITH CASTS URNS QL MICRO: ABNORMAL /LPF
ERYTHROCYTE [DISTWIDTH] IN BLOOD BY AUTOMATED COUNT: 13.5 % (ref 11.5–14.5)
GLOBULIN SER CALC-MCNC: 4.8 G/DL (ref 2–4)
GLUCOSE SERPL-MCNC: 104 MG/DL (ref 65–100)
GLUCOSE UR STRIP.AUTO-MCNC: NEGATIVE MG/DL
HCT VFR BLD AUTO: 35.7 % (ref 35–47)
HGB BLD-MCNC: 11.8 G/DL (ref 11.5–16)
HGB UR QL STRIP: NEGATIVE
IMM GRANULOCYTES # BLD AUTO: 0 K/UL
IMM GRANULOCYTES NFR BLD AUTO: 0 %
KETONES UR QL STRIP.AUTO: NEGATIVE MG/DL
LEUKOCYTE ESTERASE UR QL STRIP.AUTO: ABNORMAL
LYMPHOCYTES # BLD: 1.8 K/UL (ref 0.8–3.5)
LYMPHOCYTES NFR BLD: 19 % (ref 12–49)
MCH RBC QN AUTO: 30.2 PG (ref 26–34)
MCHC RBC AUTO-ENTMCNC: 33.1 G/DL (ref 30–36.5)
MCV RBC AUTO: 91.3 FL (ref 80–99)
MONOCYTES # BLD: 1 K/UL (ref 0–1)
MONOCYTES NFR BLD: 10 % (ref 5–13)
NEUTS SEG # BLD: 6.6 K/UL (ref 1.8–8)
NEUTS SEG NFR BLD: 70 % (ref 32–75)
NITRITE UR QL STRIP.AUTO: NEGATIVE
NRBC BLD-RTO: 0 PER 100 WBC
PH UR STRIP: 6 (ref 5–8)
PLATELET # BLD AUTO: 214 K/UL (ref 150–400)
PMV BLD AUTO: 12.2 FL (ref 8.9–12.9)
POTASSIUM SERPL-SCNC: 4 MMOL/L (ref 3.5–5.1)
PROT SERPL-MCNC: 7.8 G/DL (ref 6.4–8.2)
PROT UR STRIP-MCNC: NEGATIVE MG/DL
RBC # BLD AUTO: 3.91 M/UL (ref 3.8–5.2)
RBC #/AREA URNS HPF: ABNORMAL /HPF (ref 0–5)
RBC MORPH BLD: NORMAL
SODIUM SERPL-SCNC: 139 MMOL/L (ref 136–145)
SP GR UR REFRACTOMETRY: 1.01 (ref 1–1.03)
TROPONIN I SERPL HS-MCNC: 7 NG/L (ref 0–51)
TROPONIN I SERPL HS-MCNC: 8 NG/L (ref 0–51)
TSH SERPL DL<=0.05 MIU/L-ACNC: 0.01 UIU/ML (ref 0.36–3.74)
URINE CULTURE IF INDICATED: ABNORMAL
UROBILINOGEN UR QL STRIP.AUTO: 2 EU/DL (ref 0.1–1)
WBC # BLD AUTO: 9.5 K/UL (ref 3.6–11)
WBC URNS QL MICRO: ABNORMAL /HPF (ref 0–4)

## 2023-08-19 PROCEDURE — 2580000003 HC RX 258: Performed by: INTERNAL MEDICINE

## 2023-08-19 PROCEDURE — 80053 COMPREHEN METABOLIC PANEL: CPT

## 2023-08-19 PROCEDURE — 84443 ASSAY THYROID STIM HORMONE: CPT

## 2023-08-19 PROCEDURE — 70450 CT HEAD/BRAIN W/O DYE: CPT

## 2023-08-19 PROCEDURE — 36415 COLL VENOUS BLD VENIPUNCTURE: CPT

## 2023-08-19 PROCEDURE — G0378 HOSPITAL OBSERVATION PER HR: HCPCS

## 2023-08-19 PROCEDURE — 93005 ELECTROCARDIOGRAM TRACING: CPT | Performed by: EMERGENCY MEDICINE

## 2023-08-19 PROCEDURE — 99285 EMERGENCY DEPT VISIT HI MDM: CPT

## 2023-08-19 PROCEDURE — 84484 ASSAY OF TROPONIN QUANT: CPT

## 2023-08-19 PROCEDURE — 81001 URINALYSIS AUTO W/SCOPE: CPT

## 2023-08-19 PROCEDURE — 85025 COMPLETE CBC W/AUTO DIFF WBC: CPT

## 2023-08-19 RX ORDER — CALCIUM CARBONATE 500 MG/1
500 TABLET, CHEWABLE ORAL 2 TIMES DAILY
Status: DISCONTINUED | OUTPATIENT
Start: 2023-08-19 | End: 2023-08-22 | Stop reason: HOSPADM

## 2023-08-19 RX ORDER — ONDANSETRON 4 MG/1
4 TABLET, ORALLY DISINTEGRATING ORAL EVERY 8 HOURS PRN
Status: DISCONTINUED | OUTPATIENT
Start: 2023-08-19 | End: 2023-08-22 | Stop reason: HOSPADM

## 2023-08-19 RX ORDER — 0.9 % SODIUM CHLORIDE 0.9 %
500 INTRAVENOUS SOLUTION INTRAVENOUS ONCE
Status: COMPLETED | OUTPATIENT
Start: 2023-08-19 | End: 2023-08-20

## 2023-08-19 RX ORDER — ASPIRIN 81 MG/1
81 TABLET ORAL DAILY
Status: DISCONTINUED | OUTPATIENT
Start: 2023-08-20 | End: 2023-08-20

## 2023-08-19 RX ORDER — TIMOLOL MALEATE 5 MG/ML
1 SOLUTION/ DROPS OPHTHALMIC 2 TIMES DAILY
Status: DISCONTINUED | OUTPATIENT
Start: 2023-08-19 | End: 2023-08-20

## 2023-08-19 RX ORDER — POLYETHYLENE GLYCOL 3350 17 G/17G
17 POWDER, FOR SOLUTION ORAL DAILY PRN
Status: DISCONTINUED | OUTPATIENT
Start: 2023-08-19 | End: 2023-08-22 | Stop reason: HOSPADM

## 2023-08-19 RX ORDER — ASCORBIC ACID 500 MG
500 TABLET ORAL DAILY
Status: DISCONTINUED | OUTPATIENT
Start: 2023-08-20 | End: 2023-08-22 | Stop reason: HOSPADM

## 2023-08-19 RX ORDER — SODIUM CHLORIDE 0.9 % (FLUSH) 0.9 %
5-40 SYRINGE (ML) INJECTION EVERY 12 HOURS SCHEDULED
Status: DISCONTINUED | OUTPATIENT
Start: 2023-08-19 | End: 2023-08-22 | Stop reason: HOSPADM

## 2023-08-19 RX ORDER — LATANOPROST 50 UG/ML
1 SOLUTION/ DROPS OPHTHALMIC NIGHTLY
Status: DISCONTINUED | OUTPATIENT
Start: 2023-08-19 | End: 2023-08-22 | Stop reason: HOSPADM

## 2023-08-19 RX ORDER — FERROUS SULFATE 325(65) MG
324 TABLET ORAL
Status: DISCONTINUED | OUTPATIENT
Start: 2023-08-20 | End: 2023-08-22 | Stop reason: HOSPADM

## 2023-08-19 RX ORDER — ACETAMINOPHEN 325 MG/1
650 TABLET ORAL EVERY 6 HOURS PRN
Status: DISCONTINUED | OUTPATIENT
Start: 2023-08-19 | End: 2023-08-22 | Stop reason: HOSPADM

## 2023-08-19 RX ORDER — SODIUM CHLORIDE 0.9 % (FLUSH) 0.9 %
5-40 SYRINGE (ML) INJECTION PRN
Status: DISCONTINUED | OUTPATIENT
Start: 2023-08-19 | End: 2023-08-22 | Stop reason: HOSPADM

## 2023-08-19 RX ORDER — GABAPENTIN 300 MG/1
300 CAPSULE ORAL NIGHTLY
Status: DISCONTINUED | OUTPATIENT
Start: 2023-08-19 | End: 2023-08-20

## 2023-08-19 RX ORDER — ACETAMINOPHEN 650 MG/1
650 SUPPOSITORY RECTAL EVERY 6 HOURS PRN
Status: DISCONTINUED | OUTPATIENT
Start: 2023-08-19 | End: 2023-08-22 | Stop reason: HOSPADM

## 2023-08-19 RX ORDER — SODIUM CHLORIDE 9 MG/ML
INJECTION, SOLUTION INTRAVENOUS PRN
Status: DISCONTINUED | OUTPATIENT
Start: 2023-08-19 | End: 2023-08-22 | Stop reason: HOSPADM

## 2023-08-19 RX ORDER — DORZOLAMIDE HCL 20 MG/ML
2 SOLUTION/ DROPS OPHTHALMIC DAILY
Status: DISCONTINUED | OUTPATIENT
Start: 2023-08-20 | End: 2023-08-22 | Stop reason: HOSPADM

## 2023-08-19 RX ORDER — CLONIDINE HYDROCHLORIDE 0.1 MG/1
0.1 TABLET ORAL NIGHTLY
Status: DISCONTINUED | OUTPATIENT
Start: 2023-08-19 | End: 2023-08-22 | Stop reason: HOSPADM

## 2023-08-19 RX ORDER — ENOXAPARIN SODIUM 100 MG/ML
30 INJECTION SUBCUTANEOUS DAILY
Status: DISCONTINUED | OUTPATIENT
Start: 2023-08-20 | End: 2023-08-20

## 2023-08-19 RX ORDER — AMLODIPINE BESYLATE 5 MG/1
5 TABLET ORAL DAILY
Status: DISCONTINUED | OUTPATIENT
Start: 2023-08-20 | End: 2023-08-22 | Stop reason: HOSPADM

## 2023-08-19 RX ORDER — ONDANSETRON 2 MG/ML
4 INJECTION INTRAMUSCULAR; INTRAVENOUS EVERY 6 HOURS PRN
Status: DISCONTINUED | OUTPATIENT
Start: 2023-08-19 | End: 2023-08-22 | Stop reason: HOSPADM

## 2023-08-19 RX ADMIN — SODIUM CHLORIDE, PRESERVATIVE FREE 10 ML: 5 INJECTION INTRAVENOUS at 22:54

## 2023-08-19 ASSESSMENT — LIFESTYLE VARIABLES
HOW OFTEN DO YOU HAVE A DRINK CONTAINING ALCOHOL: NEVER
HOW MANY STANDARD DRINKS CONTAINING ALCOHOL DO YOU HAVE ON A TYPICAL DAY: PATIENT DOES NOT DRINK

## 2023-08-19 ASSESSMENT — PAIN - FUNCTIONAL ASSESSMENT
PAIN_FUNCTIONAL_ASSESSMENT: 0-10
PAIN_FUNCTIONAL_ASSESSMENT: NONE - DENIES PAIN

## 2023-08-19 ASSESSMENT — PAIN SCALES - GENERAL: PAINLEVEL_OUTOF10: 0

## 2023-08-19 NOTE — ED TRIAGE NOTES
Patient presents dizziness that began since 3 pm, has not had a syncopal episode but feels like she is going to. Had a sliding episode down to the ground 4 times today and 3 times yesterday. Seen for dizziness at PCP recently with no changes to medications.

## 2023-08-20 ENCOUNTER — APPOINTMENT (OUTPATIENT)
Facility: HOSPITAL | Age: 88
DRG: 310 | End: 2023-08-20
Attending: INTERNAL MEDICINE
Payer: MEDICARE

## 2023-08-20 ENCOUNTER — APPOINTMENT (OUTPATIENT)
Facility: HOSPITAL | Age: 88
DRG: 310 | End: 2023-08-20
Payer: MEDICARE

## 2023-08-20 PROBLEM — I48.0 PAROXYSMAL ATRIAL FIBRILLATION (HCC): Status: ACTIVE | Noted: 2023-08-20

## 2023-08-20 LAB
EKG ATRIAL RATE: 77 BPM
EKG DIAGNOSIS: NORMAL
EKG P AXIS: 76 DEGREES
EKG P-R INTERVAL: 130 MS
EKG Q-T INTERVAL: 386 MS
EKG QRS DURATION: 68 MS
EKG QTC CALCULATION (BAZETT): 436 MS
EKG R AXIS: -30 DEGREES
EKG T AXIS: 43 DEGREES
EKG VENTRICULAR RATE: 77 BPM

## 2023-08-20 PROCEDURE — G0378 HOSPITAL OBSERVATION PER HR: HCPCS

## 2023-08-20 PROCEDURE — 6370000000 HC RX 637 (ALT 250 FOR IP): Performed by: INTERNAL MEDICINE

## 2023-08-20 PROCEDURE — 93306 TTE W/DOPPLER COMPLETE: CPT

## 2023-08-20 PROCEDURE — 2580000003 HC RX 258: Performed by: INTERNAL MEDICINE

## 2023-08-20 PROCEDURE — 6360000002 HC RX W HCPCS: Performed by: INTERNAL MEDICINE

## 2023-08-20 PROCEDURE — 71045 X-RAY EXAM CHEST 1 VIEW: CPT

## 2023-08-20 RX ORDER — AMIODARONE HYDROCHLORIDE 200 MG/1
200 TABLET ORAL 2 TIMES DAILY
Status: DISCONTINUED | OUTPATIENT
Start: 2023-08-20 | End: 2023-08-22 | Stop reason: HOSPADM

## 2023-08-20 RX ORDER — IRBESARTAN AND HYDROCHLOROTHIAZIDE 150; 12.5 MG/1; MG/1
1 TABLET, FILM COATED ORAL DAILY
Status: DISCONTINUED | OUTPATIENT
Start: 2023-08-20 | End: 2023-08-22 | Stop reason: HOSPADM

## 2023-08-20 RX ORDER — TIMOLOL MALEATE 5 MG/ML
1 SOLUTION/ DROPS OPHTHALMIC DAILY
Status: DISCONTINUED | OUTPATIENT
Start: 2023-08-20 | End: 2023-08-22 | Stop reason: HOSPADM

## 2023-08-20 RX ORDER — GABAPENTIN 300 MG/1
300 CAPSULE ORAL NIGHTLY PRN
Status: DISCONTINUED | OUTPATIENT
Start: 2023-08-20 | End: 2023-08-22 | Stop reason: HOSPADM

## 2023-08-20 RX ORDER — ACETAMINOPHEN 500 MG
500 TABLET ORAL EVERY 6 HOURS PRN
COMMUNITY

## 2023-08-20 RX ORDER — FUROSEMIDE 40 MG/1
20 TABLET ORAL DAILY
Status: DISCONTINUED | OUTPATIENT
Start: 2023-08-20 | End: 2023-08-22 | Stop reason: HOSPADM

## 2023-08-20 RX ORDER — LEVOTHYROXINE SODIUM 0.1 MG/1
100 TABLET ORAL DAILY
Status: DISCONTINUED | OUTPATIENT
Start: 2023-08-21 | End: 2023-08-22 | Stop reason: HOSPADM

## 2023-08-20 RX ADMIN — APIXABAN 2.5 MG: 2.5 TABLET, FILM COATED ORAL at 15:34

## 2023-08-20 RX ADMIN — CALCIUM CARBONATE 500 MG: 500 TABLET, CHEWABLE ORAL at 10:45

## 2023-08-20 RX ADMIN — AMIODARONE HYDROCHLORIDE 200 MG: 200 TABLET ORAL at 20:20

## 2023-08-20 RX ADMIN — FERROUS SULFATE TAB 325 MG (65 MG ELEMENTAL FE) 324 MG: 325 (65 FE) TAB at 06:37

## 2023-08-20 RX ADMIN — APIXABAN 2.5 MG: 2.5 TABLET, FILM COATED ORAL at 20:20

## 2023-08-20 RX ADMIN — ASPIRIN 81 MG: 81 TABLET, COATED ORAL at 10:45

## 2023-08-20 RX ADMIN — DORZOLAMIDE HYDROCHLORIDE 2 DROP: 20 SOLUTION/ DROPS OPHTHALMIC at 11:21

## 2023-08-20 RX ADMIN — AMIODARONE HYDROCHLORIDE 200 MG: 200 TABLET ORAL at 15:34

## 2023-08-20 RX ADMIN — LEVOTHYROXINE SODIUM 175 MCG: 0.1 TABLET ORAL at 06:37

## 2023-08-20 RX ADMIN — AMLODIPINE BESYLATE 5 MG: 5 TABLET ORAL at 10:45

## 2023-08-20 RX ADMIN — TIMOLOL MALEATE 1 DROP: 5 SOLUTION OPHTHALMIC at 11:20

## 2023-08-20 RX ADMIN — SODIUM CHLORIDE, PRESERVATIVE FREE 10 ML: 5 INJECTION INTRAVENOUS at 20:22

## 2023-08-20 RX ADMIN — SODIUM CHLORIDE, PRESERVATIVE FREE 10 ML: 5 INJECTION INTRAVENOUS at 10:46

## 2023-08-20 RX ADMIN — CLONIDINE HYDROCHLORIDE 0.1 MG: 0.1 TABLET ORAL at 00:18

## 2023-08-20 RX ADMIN — OXYCODONE HYDROCHLORIDE AND ACETAMINOPHEN 500 MG: 500 TABLET ORAL at 10:45

## 2023-08-20 RX ADMIN — SODIUM CHLORIDE 500 ML: 9 INJECTION, SOLUTION INTRAVENOUS at 00:46

## 2023-08-20 RX ADMIN — CLONIDINE HYDROCHLORIDE 0.1 MG: 0.1 TABLET ORAL at 20:20

## 2023-08-20 RX ADMIN — ENOXAPARIN SODIUM 30 MG: 100 INJECTION SUBCUTANEOUS at 10:45

## 2023-08-20 RX ADMIN — LATANOPROST 1 DROP: 50 SOLUTION OPHTHALMIC at 20:20

## 2023-08-20 ASSESSMENT — PAIN SCALES - GENERAL: PAINLEVEL_OUTOF10: 0

## 2023-08-20 NOTE — ED PROVIDER NOTES
[facility] EMERGENCY DEPT  EMERGENCY DEPARTMENT HISTORY AND PHYSICAL EXAM      Date: 8/19/2023  Patient Name: Rubia Banda  MRN: [id number]  [address] [facility] 5/9/1931  Date of evaluation: 8/19/2023  Provider: Yuliana Lobato MD   Note Started: 9:38 PM EDT 8/19/23    HISTORY OF PRESENT ILLNESS     Chief Complaint   Patient presents with    Dizziness       History Provided By: Patient    HPI: Rubia Banda is a 80 y.o. female past medical history of hypertension, hypercholesterolemia, hypothyroidism presents for evaluation of multiple episodes where she suddenly has near loss of consciousness or loss of consciousness and \"slides\" to the ground. No prodromal symptoms. Has had episodes like this over the last few months but have become more frequent for last 2 days. 3 yesterday and 4 today. No thinners. No chest pain shortness of breath or any other symptoms.     PAST MEDICAL HISTORY   Past Medical History:  Past Medical History:   Diagnosis Date    Benign essential hypertension     Breast cancer ([address])     left side-2005    Closed nondisplaced oblique fracture of shaft of right fibula with routine healing, subsequent encounter     Constipation in female     Elevated glucose     Elevated serum creatinine     Hypercholesteremia     Hyperthyroidism     Hypothyroidism, adult     Mild anemia     Sciatica of right side     Smoldering multiple myeloma        Past Surgical History:  Past Surgical History:   Procedure Laterality Date    CYST REMOVAL      2/2004    HYSTERECTOMY (CERVIX STATUS UNKNOWN)      age 76    IR ASP BONE MARROW  12/15/2017    Dr. Viveros Pel Left     2005       Family History:  Family History   Problem Relation Age of Onset    Colon Cancer Son     Cancer Maternal Grandfather         bone    Stroke Father     Heart Attack Maternal Grandmother        Social History:  Social History     Tobacco Use    Smoking status: Never    Smokeless tobacco: Never   Vaping Use    Vaping Use: Never used

## 2023-08-20 NOTE — H&P
HISTORY & PHYSICAL  Angel Ham MD, [place], [place]         NAME: Delaney Allen   :  1931   MRN:  [id number]     Date/Time:  2023 10:12 PM    Patient PCP: Daniel Apple MD       Subjective:   CHIEF COMPLAINT: Fainting spells and dizziness    HISTORY OF PRESENT ILLNESS:     Delaney Allen is a 80 y.o. female with remote history of breast cancer, hypertension presents with multiple episodes of syncope at home. Patient evaluated in the ED and is alert and oriented x4 with family at bedside. She states that she has had multiple syncopal episodes over the past several months which have progressively been increasing in frequency. She experiences progressive worsening dizziness until she has mild syncopal episode that only last for 1 to 2-second that she recovers fairly quickly. Bedside Cope-Hallpike maneuver is negative for BPPV. She denies any headache, vision loss, focal deficits, chest pain, shortness of breath, fever/chills. She denies any dysuria. CT of the head evaluated in the ED and myself is essentially negative for any acute process. Patient is noted to be on multiple antihypertensives including clonidine at nighttime, Lasix as well as combination ARB/HCTZ. Patient and family do not recall if there are any new additions to her antihypertensives recently. EKG in the ED with normal sinus rhythm without any ST elevation or depression noted. Counseled that we will monitor patient on observation on telemetry and check orthostatics along with other work-up including TSH. Patient and family are in agreement with POC for observation. Case reviewed extensively with ED physician and agree that patient requires inpatient admission/observation for further treatment and management.      Past Medical History:   Diagnosis Date    Benign essential hypertension     Breast cancer ([address])

## 2023-08-20 NOTE — CONSULTS
Cardiology Consult    NAME: Scar Zuleta   :  1931   MRN:  [id number]     Date/Time:  2023 1:22 PM    Patient PCP: Gavin Loo MD  ________________________________________________________________________     Assessment/Plan:   Syncope or near syncopal spell, most likely secondary to paroxysmal.  Patient does report associated fluttering in the chest.     Paroxysmal atrial fibrillation, seen on Holter, will start amiodarone. Await echo. Will need anticoagulation to prevent stroke prevention. Hopefully can prevent syncope or near syncopal spells with amiodarone. We will continue telemetry. Hypothyroidism, on replacement, elevated TSH  and now with low TSH. May need to back off on levothyroxine. Hypertension, fairly well controlled. Hypercholesterolemia    History of breast cancer               []        High complexity decision making was performed        Subjective:   CHIEF COMPLAINT:     Syncope or near syncope  REASON FOR CONSULT:  Paroxysmal atrial fibrillation and syncope or near syncope    HISTORY OF PRESENT ILLNESS:     Scar Zuleta is a 80 y.o. Black / [place] American female who presents with a near syncopal spell. Patient had prior episodes of syncope or near syncopal spells. Had a Holter placed 2023 and showed paroxysmal A-fib with heart rate varying from 49-1 63. With this patient was supposed to follow-up with me in the office. Admitted with now near syncopal or syncopal spell. Patient does have a low TSH. On Synthroid 175 mcg. Recommend backing off on levothyroxine? Compliance issues. Will obtain echo. Start amiodarone. Will need anticoagulation, increased risk with near syncopal spells. Will also discussed with daughter.       Past Medical History:   Diagnosis Date    Benign essential hypertension     Breast cancer ([address])     left side-    Closed nondisplaced oblique fracture of shaft of right fibula with routine healing, subsequent

## 2023-08-21 LAB
ANION GAP SERPL CALC-SCNC: 1 MMOL/L (ref 5–15)
BUN SERPL-MCNC: 18 MG/DL (ref 6–20)
BUN/CREAT SERPL: 19 (ref 12–20)
CA-I BLD-MCNC: 8.9 MG/DL (ref 8.5–10.1)
CHLORIDE SERPL-SCNC: 110 MMOL/L (ref 97–108)
CO2 SERPL-SCNC: 28 MMOL/L (ref 21–32)
CREAT SERPL-MCNC: 0.94 MG/DL (ref 0.55–1.02)
ECHO AO ASC DIAM: 3.2 CM
ECHO AO ASCENDING AORTA INDEX: 2.01 CM/M2
ECHO AO ROOT DIAM: 2.4 CM
ECHO AO ROOT INDEX: 1.51 CM/M2
ECHO AV AREA PEAK VELOCITY: 2.1 CM2
ECHO AV AREA VTI: 2.1 CM2
ECHO AV AREA/BSA PEAK VELOCITY: 1.3 CM2/M2
ECHO AV AREA/BSA VTI: 1.3 CM2/M2
ECHO AV MEAN GRADIENT: 4 MMHG
ECHO AV MEAN VELOCITY: 0.9 M/S
ECHO AV PEAK GRADIENT: 7 MMHG
ECHO AV PEAK VELOCITY: 1.3 M/S
ECHO AV VELOCITY RATIO: 0.77
ECHO AV VTI: 31.3 CM
ECHO BSA: 1.58 M2
ECHO IVC EXP: 2 CM
ECHO IVC INSP: 1 CM
ECHO LA AREA 2C: 14.5 CM2
ECHO LA AREA 4C: 13.1 CM2
ECHO LA DIAMETER INDEX: 1.82 CM/M2
ECHO LA DIAMETER: 2.9 CM
ECHO LA MAJOR AXIS: 4.4 CM
ECHO LA MINOR AXIS: 5.1 CM
ECHO LA TO AORTIC ROOT RATIO: 1.21
ECHO LA VOL 2C: 34 ML (ref 22–52)
ECHO LA VOL 4C: 33 ML (ref 22–52)
ECHO LA VOL BP: 36 ML (ref 22–52)
ECHO LA VOL/BSA BIPLANE: 23 ML/M2 (ref 16–34)
ECHO LA VOLUME INDEX A2C: 21 ML/M2 (ref 16–34)
ECHO LA VOLUME INDEX A4C: 21 ML/M2 (ref 16–34)
ECHO LV E' LATERAL VELOCITY: 10 CM/S
ECHO LV E' SEPTAL VELOCITY: 9 CM/S
ECHO LV EDV A2C: 59 ML
ECHO LV EDV A4C: 64 ML
ECHO LV EDV INDEX A4C: 40 ML/M2
ECHO LV EDV NDEX A2C: 37 ML/M2
ECHO LV EJECTION FRACTION A2C: 62 %
ECHO LV EJECTION FRACTION A4C: 63 %
ECHO LV EJECTION FRACTION BIPLANE: 62 % (ref 55–100)
ECHO LV ESV A2C: 22 ML
ECHO LV ESV A4C: 24 ML
ECHO LV ESV INDEX A2C: 14 ML/M2
ECHO LV ESV INDEX A4C: 15 ML/M2
ECHO LV FRACTIONAL SHORTENING: 39 % (ref 28–44)
ECHO LV INTERNAL DIMENSION DIASTOLE INDEX: 2.39 CM/M2
ECHO LV INTERNAL DIMENSION DIASTOLIC: 3.8 CM (ref 3.9–5.3)
ECHO LV INTERNAL DIMENSION SYSTOLIC INDEX: 1.45 CM/M2
ECHO LV INTERNAL DIMENSION SYSTOLIC: 2.3 CM
ECHO LV IVSD: 1.1 CM (ref 0.6–0.9)
ECHO LV MASS 2D: 125.8 G (ref 67–162)
ECHO LV MASS INDEX 2D: 79.1 G/M2 (ref 43–95)
ECHO LV POSTERIOR WALL DIASTOLIC: 1 CM (ref 0.6–0.9)
ECHO LV RELATIVE WALL THICKNESS RATIO: 0.53
ECHO LVOT AREA: 2.8 CM2
ECHO LVOT AV VTI INDEX: 0.74
ECHO LVOT DIAM: 1.9 CM
ECHO LVOT MEAN GRADIENT: 2 MMHG
ECHO LVOT PEAK GRADIENT: 4 MMHG
ECHO LVOT PEAK VELOCITY: 1 M/S
ECHO LVOT STROKE VOLUME INDEX: 41.2 ML/M2
ECHO LVOT SV: 65.5 ML
ECHO LVOT VTI: 23.1 CM
ECHO MV A VELOCITY: 1.09 M/S
ECHO MV AREA VTI: 1.8 CM2
ECHO MV E DECELERATION TIME (DT): 180 MS
ECHO MV E VELOCITY: 1.23 M/S
ECHO MV E/A RATIO: 1.13
ECHO MV E/E' LATERAL: 12.3
ECHO MV E/E' RATIO (AVERAGED): 12.98
ECHO MV E/E' SEPTAL: 13.67
ECHO MV LVOT VTI INDEX: 1.58
ECHO MV MAX VELOCITY: 1.3 M/S
ECHO MV MEAN GRADIENT: 2 MMHG
ECHO MV MEAN VELOCITY: 0.7 M/S
ECHO MV PEAK GRADIENT: 7 MMHG
ECHO MV VTI: 36.6 CM
ECHO PV MAX VELOCITY: 0.8 M/S
ECHO PV PEAK GRADIENT: 2 MMHG
ECHO RA AREA 4C: 11.9 CM2
ECHO RA END SYSTOLIC VOLUME APICAL 4 CHAMBER INDEX BSA: 16 ML/M2
ECHO RA VOLUME: 26 ML
ECHO RV BASAL DIMENSION: 3.1 CM
ECHO RV FREE WALL PEAK S': 12 CM/S
ECHO RV TAPSE: 1.6 CM (ref 1.7–?)
ECHO TV REGURGITANT MAX VELOCITY: 2.82 M/S
ECHO TV REGURGITANT PEAK GRADIENT: 32 MMHG
ERYTHROCYTE [DISTWIDTH] IN BLOOD BY AUTOMATED COUNT: 13.3 % (ref 11.5–14.5)
GLUCOSE SERPL-MCNC: 102 MG/DL (ref 65–100)
HCT VFR BLD AUTO: 33.3 % (ref 35–47)
HGB BLD-MCNC: 10.8 G/DL (ref 11.5–16)
MAGNESIUM SERPL-MCNC: 2 MG/DL (ref 1.6–2.4)
MCH RBC QN AUTO: 29.8 PG (ref 26–34)
MCHC RBC AUTO-ENTMCNC: 32.4 G/DL (ref 30–36.5)
MCV RBC AUTO: 91.7 FL (ref 80–99)
NRBC # BLD: 0 K/UL (ref 0–0.01)
NRBC BLD-RTO: 0 PER 100 WBC
PLATELET # BLD AUTO: 213 K/UL (ref 150–400)
PMV BLD AUTO: 12.5 FL (ref 8.9–12.9)
POTASSIUM SERPL-SCNC: 3.9 MMOL/L (ref 3.5–5.1)
RBC # BLD AUTO: 3.63 M/UL (ref 3.8–5.2)
SODIUM SERPL-SCNC: 139 MMOL/L (ref 136–145)
WBC # BLD AUTO: 7 K/UL (ref 3.6–11)

## 2023-08-21 PROCEDURE — 97161 PT EVAL LOW COMPLEX 20 MIN: CPT

## 2023-08-21 PROCEDURE — 97530 THERAPEUTIC ACTIVITIES: CPT

## 2023-08-21 PROCEDURE — 97165 OT EVAL LOW COMPLEX 30 MIN: CPT

## 2023-08-21 PROCEDURE — 36415 COLL VENOUS BLD VENIPUNCTURE: CPT

## 2023-08-21 PROCEDURE — 80048 BASIC METABOLIC PNL TOTAL CA: CPT

## 2023-08-21 PROCEDURE — 6370000000 HC RX 637 (ALT 250 FOR IP): Performed by: INTERNAL MEDICINE

## 2023-08-21 PROCEDURE — 1100000000 HC RM PRIVATE

## 2023-08-21 PROCEDURE — 83735 ASSAY OF MAGNESIUM: CPT

## 2023-08-21 PROCEDURE — 85027 COMPLETE CBC AUTOMATED: CPT

## 2023-08-21 PROCEDURE — 2580000003 HC RX 258: Performed by: INTERNAL MEDICINE

## 2023-08-21 PROCEDURE — G0378 HOSPITAL OBSERVATION PER HR: HCPCS

## 2023-08-21 RX ADMIN — AMIODARONE HYDROCHLORIDE 200 MG: 200 TABLET ORAL at 21:16

## 2023-08-21 RX ADMIN — TIMOLOL MALEATE 1 DROP: 5 SOLUTION OPHTHALMIC at 08:36

## 2023-08-21 RX ADMIN — OXYCODONE HYDROCHLORIDE AND ACETAMINOPHEN 500 MG: 500 TABLET ORAL at 08:35

## 2023-08-21 RX ADMIN — DORZOLAMIDE HYDROCHLORIDE 2 DROP: 20 SOLUTION/ DROPS OPHTHALMIC at 08:36

## 2023-08-21 RX ADMIN — CALCIUM CARBONATE 500 MG: 500 TABLET, CHEWABLE ORAL at 21:16

## 2023-08-21 RX ADMIN — LATANOPROST 1 DROP: 50 SOLUTION OPHTHALMIC at 21:30

## 2023-08-21 RX ADMIN — APIXABAN 2.5 MG: 2.5 TABLET, FILM COATED ORAL at 08:35

## 2023-08-21 RX ADMIN — SODIUM CHLORIDE, PRESERVATIVE FREE 10 ML: 5 INJECTION INTRAVENOUS at 21:16

## 2023-08-21 RX ADMIN — LEVOTHYROXINE SODIUM 100 MCG: 0.1 TABLET ORAL at 05:46

## 2023-08-21 RX ADMIN — AMLODIPINE BESYLATE 5 MG: 5 TABLET ORAL at 08:35

## 2023-08-21 RX ADMIN — AMIODARONE HYDROCHLORIDE 200 MG: 200 TABLET ORAL at 08:35

## 2023-08-21 RX ADMIN — FERROUS SULFATE TAB 325 MG (65 MG ELEMENTAL FE) 324 MG: 325 (65 FE) TAB at 05:46

## 2023-08-21 RX ADMIN — APIXABAN 2.5 MG: 2.5 TABLET, FILM COATED ORAL at 21:16

## 2023-08-21 RX ADMIN — CALCIUM CARBONATE 500 MG: 500 TABLET, CHEWABLE ORAL at 08:35

## 2023-08-21 RX ADMIN — SODIUM CHLORIDE, PRESERVATIVE FREE 10 ML: 5 INJECTION INTRAVENOUS at 08:38

## 2023-08-21 ASSESSMENT — PAIN SCALES - GENERAL: PAINLEVEL_OUTOF10: 0

## 2023-08-22 VITALS
HEIGHT: 65 IN | OXYGEN SATURATION: 100 % | BODY MASS INDEX: 19.99 KG/M2 | HEART RATE: 60 BPM | TEMPERATURE: 97.9 F | WEIGHT: 120 LBS | SYSTOLIC BLOOD PRESSURE: 161 MMHG | DIASTOLIC BLOOD PRESSURE: 64 MMHG | RESPIRATION RATE: 18 BRPM

## 2023-08-22 LAB
ANION GAP SERPL CALC-SCNC: 3 MMOL/L (ref 5–15)
BUN SERPL-MCNC: 18 MG/DL (ref 6–20)
BUN/CREAT SERPL: 16 (ref 12–20)
CA-I BLD-MCNC: 9.1 MG/DL (ref 8.5–10.1)
CHLORIDE SERPL-SCNC: 109 MMOL/L (ref 97–108)
CO2 SERPL-SCNC: 29 MMOL/L (ref 21–32)
CREAT SERPL-MCNC: 1.11 MG/DL (ref 0.55–1.02)
ERYTHROCYTE [DISTWIDTH] IN BLOOD BY AUTOMATED COUNT: 13.4 % (ref 11.5–14.5)
GLUCOSE SERPL-MCNC: 94 MG/DL (ref 65–100)
HCT VFR BLD AUTO: 34.6 % (ref 35–47)
HGB BLD-MCNC: 11.3 G/DL (ref 11.5–16)
MCH RBC QN AUTO: 30.3 PG (ref 26–34)
MCHC RBC AUTO-ENTMCNC: 32.7 G/DL (ref 30–36.5)
MCV RBC AUTO: 92.8 FL (ref 80–99)
NRBC # BLD: 0 K/UL (ref 0–0.01)
NRBC BLD-RTO: 0 PER 100 WBC
PLATELET # BLD AUTO: 198 K/UL (ref 150–400)
PMV BLD AUTO: 12.6 FL (ref 8.9–12.9)
POTASSIUM SERPL-SCNC: 3.7 MMOL/L (ref 3.5–5.1)
RBC # BLD AUTO: 3.73 M/UL (ref 3.8–5.2)
SODIUM SERPL-SCNC: 141 MMOL/L (ref 136–145)
WBC # BLD AUTO: 7.2 K/UL (ref 3.6–11)

## 2023-08-22 PROCEDURE — 85027 COMPLETE CBC AUTOMATED: CPT

## 2023-08-22 PROCEDURE — 2580000003 HC RX 258: Performed by: INTERNAL MEDICINE

## 2023-08-22 PROCEDURE — 36415 COLL VENOUS BLD VENIPUNCTURE: CPT

## 2023-08-22 PROCEDURE — 6370000000 HC RX 637 (ALT 250 FOR IP): Performed by: INTERNAL MEDICINE

## 2023-08-22 PROCEDURE — 80048 BASIC METABOLIC PNL TOTAL CA: CPT

## 2023-08-22 PROCEDURE — 97530 THERAPEUTIC ACTIVITIES: CPT

## 2023-08-22 RX ORDER — AMLODIPINE BESYLATE 10 MG/1
10 TABLET ORAL DAILY
Qty: 30 TABLET | Refills: 1 | Status: SHIPPED | OUTPATIENT
Start: 2023-08-22 | End: 2023-09-21

## 2023-08-22 RX ORDER — AMIODARONE HYDROCHLORIDE 200 MG/1
200 TABLET ORAL 2 TIMES DAILY
Qty: 60 TABLET | Refills: 0 | Status: SHIPPED | OUTPATIENT
Start: 2023-08-22 | End: 2023-09-21

## 2023-08-22 RX ORDER — LEVOTHYROXINE SODIUM 0.1 MG/1
100 TABLET ORAL DAILY
Qty: 30 TABLET | Refills: 3 | Status: SHIPPED | OUTPATIENT
Start: 2023-08-23

## 2023-08-22 RX ADMIN — OXYCODONE HYDROCHLORIDE AND ACETAMINOPHEN 500 MG: 500 TABLET ORAL at 11:24

## 2023-08-22 RX ADMIN — APIXABAN 2.5 MG: 2.5 TABLET, FILM COATED ORAL at 11:24

## 2023-08-22 RX ADMIN — DORZOLAMIDE HYDROCHLORIDE 2 DROP: 20 SOLUTION/ DROPS OPHTHALMIC at 11:26

## 2023-08-22 RX ADMIN — AMIODARONE HYDROCHLORIDE 200 MG: 200 TABLET ORAL at 11:24

## 2023-08-22 RX ADMIN — CALCIUM CARBONATE 500 MG: 500 TABLET, CHEWABLE ORAL at 11:23

## 2023-08-22 RX ADMIN — LEVOTHYROXINE SODIUM 100 MCG: 0.1 TABLET ORAL at 06:14

## 2023-08-22 RX ADMIN — SODIUM CHLORIDE, PRESERVATIVE FREE 10 ML: 5 INJECTION INTRAVENOUS at 11:27

## 2023-08-22 RX ADMIN — FERROUS SULFATE TAB 325 MG (65 MG ELEMENTAL FE) 324 MG: 325 (65 FE) TAB at 06:14

## 2023-08-22 RX ADMIN — TIMOLOL MALEATE 1 DROP: 5 SOLUTION OPHTHALMIC at 11:26

## 2023-08-22 RX ADMIN — AMLODIPINE BESYLATE 5 MG: 5 TABLET ORAL at 11:23

## 2023-08-22 NOTE — DISCHARGE SUMMARY
HOSPITALIST DISCHARGE NOTE  Zachary Ta MD, [address] [place], [place]       PATIENT ID: Nereyda Shore  MRN: [id number]   YOB: 1931    DATE OF ADMISSION: 8/19/2023  7:20 PM    DATE OF DISCHARGE: 08/22/23    PRIMARY CARE PROVIDER: Mariely Alan MD     ATTENDING PHYSICIAN: Zachary Ta MD  DISCHARGING PROVIDER: Zachary Ta MD        CONSULTATIONS: IP CONSULT TO CARDIOLOGY    PROCEDURES/SURGERIES: * No surgery found *    [address] COURSE:     80 y.o. female with remote history of breast cancer, hypertension presents with multiple episodes of syncope at home. Patient evaluated in the ED and is alert and oriented x4 with family at bedside. She states that she has had multiple syncopal episodes over the past several months which have progressively been increasing in frequency. She experiences progressive worsening dizziness until she has mild syncopal episode that only last for 1 to 2-second that she recovers fairly quickly. Bedside [place]-Hallpike maneuver is negative for BPPV. She denies any headache, vision loss, focal deficits, chest pain, shortness of breath, fever/chills. She denies any dysuria. CT of the head evaluated in the ED and myself is essentially negative for any acute process. Patient is noted to be on multiple antihypertensives including clonidine at nighttime, Lasix as well as combination ARB/HCTZ. Patient and family do not recall if there are any new additions to her antihypertensives recently. EKG in the ED with normal sinus rhythm without any ST elevation or depression noted. DISCHARGE DIAGNOSES / PLAN:      Syncope  Likely secondary to paroxysmal atrial fibrillation noted on Holter monitor results from 8/3/2023 per cardiology. No repeat episodes of syncope or dizziness or presyncope during hospitalization.   Echocardiogram with preserved EF of

## 2023-08-22 NOTE — PROGRESS NOTES
Dc instructions given to patient and daughter. Verbalized understanding. On room air. No distress noted.  Dc home via wheelchair with family
Discharge paperwork complete. Just print when patient ready to leave. Primary nurse aware.
OCCUPATIONAL THERAPY EVALUATION  Patient: Herbert Gastelum (32 y.o. female)  Date: 8/21/2023  Primary Diagnosis: Syncope and collapse [R55]       Precautions: Fall Risk, Bed Alarm                In place during session:EKG/telemetry     ASSESSMENT  Pt is a 80 y.o. female presenting to [facility] with c/o several syncope episodes, admitted 8/19 and currently being treated for paroxysmal A-fib, HTN, and syncope. Pt received sitting on commode w/ PT in room upon arrival, AXO x4, and agreeable to OT evaluation. Family in room and remained w/ permission    Based on current observations, pt presents with decreased  functional mobility, independence in ADLs, ROM, strength, sensation, activity tolerance, endurance, balance (see below for objective details and assist levels). Overall, pt tolerates session fair w/out c/o pain, dizziness, or SOB. She completed commode transfer w/ SBA-SUP and ambulation within the room using RW w/ SBA-CGA; no LOB or unsteadiness noted. She tolerated standing at sinktop for hand hygiene w/out concerns. Pt presents w/ generally decreased B  strength and slightly decreased ROM but pt able to functionally complete tasks. Pt will benefit from continued skilled OT services to address current impairments and improve IND and safety with self cares and functional transfers/mobility. Current OT d/c recommendation home w/ family A and HHOT once medically appropriate. PT/OT discussed recommendations and assessment w/ family via as per request of pt and family. Other factors to consider for discharge: family/social support, DME, time since onset, severity of deficits, functional baseline     Patient will benefit from skilled therapy intervention to address the above noted impairments.        PLAN :  Recommendations and Planned Interventions: self care training, therapeutic activities, functional mobility training, balance training, therapeutic exercise, endurance activities, patient education,
active. Extremities: No edema bilaterally. Skin: Warm and dry. []         Post-cath site without hematoma, bruit, tenderness, or thrill. Distal pulses intact. PMH/SH reviewed - no change compared to H&P    Data Review    Telemetry:     EKG:   []  No new EKG for review    Lab Data Personally Reviewed:    Recent Labs     08/19/23 2003 08/21/23  0752   WBC 9.5 7.0   HGB 11.8 10.8*   HCT 35.7 33.3*    213     No results for input(s): INR, APTT in the last 72 hours. Invalid input(s): PTP   Recent Labs     08/19/23 2003 08/21/23  0752    139   K 4.0 3.9    110*   CO2 28 28   BUN 20 18   MG  --  2.0     No results for input(s): CPK in the last 72 hours. Invalid input(s): CPKMB, CKNDX, TROIQ  Lab Results   Component Value Date/Time    CHOL 261 04/05/2023 09:38 AM    HDL 71 04/05/2023 09:38 AM       Recent Labs     08/19/23 2003   GLOB 4.8*     No results for input(s): PH, PCO2, PO2 in the last 72 hours.     Medications Personally Reviewed:    Current Facility-Administered Medications   Medication Dose Route Frequency    gabapentin (NEURONTIN) capsule 300 mg  300 mg Oral Nightly PRN    timolol (TIMOPTIC) 0.5 % ophthalmic solution 1 drop  1 drop Ophthalmic Daily    [Held by provider] furosemide (LASIX) tablet 20 mg  20 mg Oral Daily    [Held by provider] irbesartan-hydroCHLOROthiazide (AVALIDE) 150-12.5 MG per tablet 1 tablet  1 tablet Oral Daily    levothyroxine (SYNTHROID) tablet 100 mcg  100 mcg Oral Daily    amiodarone (CORDARONE) tablet 200 mg  200 mg Oral BID    apixaban (ELIQUIS) tablet 2.5 mg  2.5 mg Oral BID    sodium chloride flush 0.9 % injection 5-40 mL  5-40 mL IntraVENous 2 times per day    sodium chloride flush 0.9 % injection 5-40 mL  5-40 mL IntraVENous PRN    0.9 % sodium chloride infusion   IntraVENous PRN    ondansetron (ZOFRAN-ODT) disintegrating tablet 4 mg  4 mg Oral Q8H PRN    Or    ondansetron (ZOFRAN) injection 4 mg  4 mg IntraVENous Q6H PRN    polyethylene glycol
medical, surgical and relevant social and family histories were reviewed. I have discussed management plan with patient/family and with nursing staff. I personally reviewed labs: all labs past 24 hours  I personally reviewed imaging/EKG:  Toxic drug monitoring:   Discussed case with:   re discharge planning      Assessment:  Plan:    Khadijah Brown is a 80 y.o. female with remote history of breast cancer, hypertension  Recent episodes of syncope and underwent an outpatient holter monitor recently with abnormalities noted, referral made to Dr Kathia Schilling for an outpatient evaluation which would have been in early September. 8/3/23 cardiac holter monitor  1. The predominant rhythm is sinus rhythm with a heart rate ranging from 49 to maximum of 163 bpm.  Average heart rate during Holter scanning was 69 bpm.  2.  Rare PVCs accounting to less than 1% of total beats counted. 3.  Infrequent supraventricular beats accounting to 6% of total beats counted. 4. Intermittent short burst of A-fib with RVR with total atrial fibrillation load of 12%. Longest run of A-fib with RVR was 12 beats long at a rate of 146 bpm.  The fastest A-fib with RVR was 7 beat long at a rate of 159 bpm  5. No runs of wide-complex tachycardia noted. 6.  No pathological pauses or high-grade AV conduction block noted. 7.  No entries were made in patient diary of symptoms. 8/19/23 admission course  Khadijah Brown is a 80 y.o. female with remote history of breast cancer, hypertension presents with multiple episodes of syncope at home. Patient evaluated in the ED and is alert and oriented x4 with family at bedside. She states that she has had multiple syncopal episodes over the past several months which have progressively been increasing in frequency. She experiences progressive worsening dizziness until she has mild syncopal episode that only last for 1 to 2-second that she recovers fairly quickly.   Bedside
a 80 y.o. female with remote history of breast cancer, hypertension presents with multiple episodes of syncope at home. Patient evaluated in the ED and is alert and oriented x4 with family at bedside. She states that she has had multiple syncopal episodes over the past several months which have progressively been increasing in frequency. She experiences progressive worsening dizziness until she has mild syncopal episode that only last for 1 to 2-second that she recovers fairly quickly. Bedside Michelle-Hallpike maneuver is negative for BPPV. She denies any headache, vision loss, focal deficits, chest pain, shortness of breath, fever/chills. She denies any dysuria. CT of the head evaluated in the ED and myself is essentially negative for any acute process. Patient is noted to be on multiple antihypertensives including clonidine at nighttime, Lasix as well as combination ARB/HCTZ. Patient and family do not recall if there are any new additions to her antihypertensives recently. EKG in the ED with normal sinus rhythm without any ST elevation or depression noted. Counseled that we will monitor patient on observation on telemetry and check orthostatics along with other work-up including TSH. Patient and family are in agreement with POC for observation. Case reviewed extensively with ED physician and agree that patient requires inpatient admission/observation for further treatment and management. 8/20/23 no new complaints, tolerating medications well  Echocardiogram pending  Dr Mo North to see  PT OT discharge planning    ASSESSMENT AND PLAN    1) Cardiovascular issues including newly discovered paroxysmal atrial fibrillation, hypertension. Syncope at admission with prior episodes in the outpatient setting and recent outpatient holter monitor. Associated with mild dizziness with leads to just 1 to 2 seconds of syncope then full recovery.  Orthostatics in the ED were mildly positive with standing blood

## 2023-08-22 NOTE — DISCHARGE INSTR - COC
Continuity of Care Form    Patient Name: Hai Sol   :  1931  MRN:  [id number]    Admit date:  2023  Discharge date:  ***    Code Status Order: Full Code   Advance Directives:     Admitting Physician:  Celestine Cook MD  PCP: Mahad Mei MD    Discharging Nurse: [facility] Unit/Room#: 565/01  Discharging Unit Phone Number: ***    Emergency Contact:   Extended Emergency Contact Information  Primary Emergency Contact: [facility] Phone: [phone]  Mobile Phone: [phone]  Relation: Child  Secondary Emergency Contact: [address] [place] [address]  Mobile Phone: [phone]  Relation: Grandchild  Preferred language: English   needed?  No    Past Surgical History:  Past Surgical History:   Procedure Laterality Date    CYST REMOVAL      2004    HYSTERECTOMY (CERVIX STATUS UNKNOWN)      age 76    IR ASP BONE MARROW  12/15/2017    Dr. 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

## 2023-08-22 NOTE — CARE COORDINATION
08/20/23 1437   Service Assessment   Patient Orientation Alert and Oriented   Cognition Alert   History Provided By Patient; Child/Family   Primary Caregiver Family   Support Systems Children;Family Members; Other (Comment)  (Has private pay pca [id number].)   PCP Verified by CM Yes   Last Visit to PCP Within last 3 months   Prior Functional Level Assistance with the following:;Cooking;Housework; Shopping   Current Functional Level Assistance with the following:;Cooking;Housework; Shopping   Can patient return to prior living arrangement Yes   Ability to make needs known: Good   Family able to assist with home care needs: Yes   Would you like for me to discuss the discharge plan with any other family members/significant others, and if so, who? Yes  (approved CM speaking with Ms Marcel Harper and her daughters)   Financial Resources Medicare   Community Resources None   Social/Functional History   Lives With Daughter   Type of Home House     The pt stated that she lives with her daughter, Garfield Morales. Her second daughter Barb Sanchez is her pcg and lives across the street from her. Ms Jim Vuong is currently out of the country and will return in a week. The private pay aide is hired to assist with the care of both the pt and her live in daughter. See previous note this date for contacts.
DC Plan: Home with daughter [facility]       Transport: family    Cm received a phone call from pt's grandson - Meryl Valerio. Mr. Christopher Webb is requesting an evening discharge. Mr. Christopher Webb is working on coordinating transportation. Mr. Christopher Webb had medical related questions. CM informed Mr. Christopher Webb CM will provide Dr. Martin Rios with his contact information so the doctor can follow up with him. PT is recommending home health. Cm discussed home health. Mr Christopher Webb indicated pt used home health previously. Mr. Christopher Webb will find out the name of the home health agency and get back with CM. Cande Riso via [employer] and asked him to contact Mr. Christopher Webb. Cm received a phone call from pt's grandson - Mr Christopher Webb. Pt used [facility] past. [facility]. Cm will set up home health with [facility] and ask them if Gauri can come back out and see pt. Cm met with pt at the bedside and updated her on her dc plan. Pt is agreeable with [facility]. Referral made via [address] Remberto called [facility] [phone] and spoke with Sunitha Khan. Cm informed her of discharge home today and asked if Gauri can see pt. Sunitha Khan will review the referral.     Alysia Prakash accepted. Cm received a message via [employer] from [facility] indicating she will request Gauri with their . Transition of Care Plan:    RUR: 13%  Prior Level of Functioning: needs assistance  Disposition: home health  If SNF or IPR: Date FOC offered: N/A  Date FOC received: N/A  Accepting facility: N/A  Date authorization started with reference number: N/A  Date authorization received and expires: N/A  Follow up appointments: Yes  DME needed: None  Transportation at discharge: family  IM/IMM Medicare/ letter given: Yes  Is patient a  and connected with [place]? If yes, was [employer] transfer form completed and [facility] notified?    Caregiver Contact: Grandson  Discharge Caregiver
Received  a call from the pts Granddaughter Wanda Goetz  regarding DC planning. Stated her Mother is the pts pcg and is currently out of town. The pt lives with her daughter, who suffers memory sxs and receives private pay personal care from [address] [place] [address]. The pt is also assisted by the same PCA. Per Ms Marcel Harper her cousin is the pts unofficial [address] . Stated the pt signs her own name and is usually A&O with occasional confusion only. Assured Ms Clear that in the event that the pt is unable to participate with her DC planning the CM dept will contact family.
No

## 2023-08-24 ENCOUNTER — TELEPHONE (OUTPATIENT)
Facility: CLINIC | Age: 88
End: 2023-08-24

## 2023-08-24 NOTE — TELEPHONE ENCOUNTER
Care Transitions Initial Follow Up Call    Call within 2 business days of discharge: Yes     Patient: Cele Magana Patient : 1931 MRN: [id number]    [unfilled]    RARS: Readmission Risk Score: 12.8       Spoke with: Daughter    Discharge department/facility: [facility]    Non-face-to-face services provided:  Patient is doing well since discharge. She has a question about a medicine but she will call back and she has to check with her aide to see when she can come in for a follow up.      Follow Up  Future Appointments   Date Time Provider [address] [place] [address]   2023  2:30 PM Alma Chen [place] [address] [address], [address] [place] [address]

## 2023-08-24 NOTE — TELEPHONE ENCOUNTER
----- Message from Lauran Dakin sent at 8/21/2023  8:40 AM EDT -----  Regarding: Question regarding ECHO (TTE) COMPLETE (CONTRAST/BUBBLE/3D PRN)  Contact: [phone]  Is the thickened cusp the cause for her fainting?

## 2023-09-05 ENCOUNTER — OFFICE VISIT (OUTPATIENT)
Facility: CLINIC | Age: 88
End: 2023-09-05

## 2023-09-05 VITALS
WEIGHT: 119 LBS | SYSTOLIC BLOOD PRESSURE: 140 MMHG | BODY MASS INDEX: 19.83 KG/M2 | TEMPERATURE: 98.7 F | HEIGHT: 65 IN | DIASTOLIC BLOOD PRESSURE: 84 MMHG | HEART RATE: 78 BPM | OXYGEN SATURATION: 99 %

## 2023-09-05 DIAGNOSIS — E03.9 HYPOTHYROIDISM, ADULT: ICD-10-CM

## 2023-09-05 DIAGNOSIS — E05.90 HYPERTHYROIDISM: ICD-10-CM

## 2023-09-05 DIAGNOSIS — R79.89 ELEVATED SERUM CREATININE: ICD-10-CM

## 2023-09-05 DIAGNOSIS — R55 SYNCOPE, UNSPECIFIED SYNCOPE TYPE: ICD-10-CM

## 2023-09-05 DIAGNOSIS — I48.0 PAROXYSMAL A-FIB (HCC): Primary | ICD-10-CM

## 2023-09-05 PROBLEM — N18.30 CHRONIC RENAL DISEASE, STAGE III (HCC): Status: ACTIVE | Noted: 2023-09-05

## 2023-09-05 PROBLEM — D68.69 SECONDARY HYPERCOAGULABLE STATE (HCC): Status: ACTIVE | Noted: 2023-09-05

## 2023-09-05 RX ORDER — TRIAMCINOLONE ACETONIDE 0.25 MG/G
1 CREAM TOPICAL EVERY 4 HOURS PRN
COMMUNITY

## 2023-09-05 RX ORDER — PIMECROLIMUS 10 MG/G
1 CREAM TOPICAL DAILY
COMMUNITY
Start: 2023-07-12

## 2023-09-05 NOTE — PROGRESS NOTES
----- Message from [place] sent at 9/29/2021  7:08 AM EDT -----  Subject: Refill Request    QUESTIONS  Name of Medication? atenolol (TENORMIN) 25 MG tablet  Patient-reported dosage and instructions? 25 MG tablet  How many days do you have left? 0  [facility]? [id number]  Pharmacy phone number (if available)? [phone]  ---------------------------------------------------------------------------  --------------,  Name of Medication? amLODIPine-benazepril (LOTREL) 5-10 MG per capsule  Patient-reported dosage and instructions? 5-10 MG per capsule  How many days do you have left? 0  [facility]? [id number]  Pharmacy phone number (if available)? [phone]  ---------------------------------------------------------------------------  --------------  [employer]  What is the best way for the office to contact you? OK to leave message on   voicemail  Preferred Call Back Phone Number?  [phone]
Chief Complaint   Patient presents with    Follow-Up from Hospital    Follow-up Chronic Condition         1. \"Have you been to the ER, urgent care clinic since your last visit? Hospitalized since your last visit? \"  [facility]    2. \"Have you seen or consulted any other health care providers outside of the [address] [place] [address] since your last visit? \" No    3. For patients aged 43-73: Has the patient had a colonoscopy / FIT/ Cologuard? NA - based on age      If the patient is female:    4. For patients aged 43-66: Has the patient had a mammogram within the past 2 years? NA - based on age or sex      11. For patients aged 21-65: Has the patient had a pap smear?  NA - based on age or sex
Booster for [place] series) 01/17/2023    Flu vaccine (1) 08/01/2023        Aspects of this note may have been generated using voice recognition software. Despite editing, there may be unrecognized errors. An electronic signature was used to authenticate this note.   -- Jannet Helton MD

## 2023-09-15 RX ORDER — LEVOTHYROXINE SODIUM 0.2 MG/1
200 TABLET ORAL
Qty: 90 TABLET | Refills: 3 | OUTPATIENT
Start: 2023-09-15

## 2023-09-15 RX ORDER — LEVOTHYROXINE SODIUM 0.1 MG/1
100 TABLET ORAL DAILY
Qty: 30 TABLET | Refills: 3 | Status: SHIPPED | OUTPATIENT
Start: 2023-09-15

## 2023-09-29 DIAGNOSIS — M54.31 SCIATICA OF RIGHT SIDE: Primary | ICD-10-CM

## 2023-09-29 RX ORDER — IRBESARTAN AND HYDROCHLOROTHIAZIDE 150; 12.5 MG/1; MG/1
1 TABLET, FILM COATED ORAL DAILY
COMMUNITY
End: 2023-09-30 | Stop reason: SDUPTHER

## 2023-09-30 RX ORDER — GABAPENTIN 300 MG/1
300 CAPSULE ORAL NIGHTLY
Qty: 90 CAPSULE | Refills: 0 | Status: SHIPPED | OUTPATIENT
Start: 2023-09-30 | End: 2023-12-29

## 2023-09-30 RX ORDER — IRBESARTAN AND HYDROCHLOROTHIAZIDE 150; 12.5 MG/1; MG/1
1 TABLET, FILM COATED ORAL DAILY
Qty: 90 TABLET | Refills: 0 | Status: SHIPPED | OUTPATIENT
Start: 2023-09-30

## 2023-10-10 RX ORDER — FUROSEMIDE 20 MG/1
20 TABLET ORAL DAILY
Qty: 90 TABLET | Refills: 3 | Status: SHIPPED | OUTPATIENT
Start: 2023-10-10

## 2023-10-16 ENCOUNTER — TELEPHONE (OUTPATIENT)
Facility: CLINIC | Age: 88
End: 2023-10-16

## 2023-10-16 NOTE — TELEPHONE ENCOUNTER
Pt's grandson notified. Pt's grandson given phone number for Dr. Abida Fox office and as well as order for labwork .  SELVIN HARDWICK MA

## 2023-11-05 DIAGNOSIS — E05.90 HYPERTHYROIDISM: ICD-10-CM

## 2023-11-23 RX ORDER — AMLODIPINE BESYLATE 5 MG/1
5 TABLET ORAL DAILY
Qty: 90 TABLET | Refills: 1 | Status: SHIPPED | OUTPATIENT
Start: 2023-11-23

## 2023-11-23 RX ORDER — CLONIDINE HYDROCHLORIDE 0.1 MG/1
TABLET ORAL
Qty: 90 TABLET | Refills: 1 | Status: SHIPPED | OUTPATIENT
Start: 2023-11-23

## 2023-11-25 DIAGNOSIS — M54.31 SCIATICA OF RIGHT SIDE: ICD-10-CM

## 2023-11-25 RX ORDER — IRBESARTAN AND HYDROCHLOROTHIAZIDE 150; 12.5 MG/1; MG/1
1 TABLET, FILM COATED ORAL DAILY
Qty: 90 TABLET | Refills: 1 | Status: SHIPPED | OUTPATIENT
Start: 2023-11-25

## 2023-11-26 RX ORDER — GABAPENTIN 300 MG/1
CAPSULE ORAL
Qty: 90 CAPSULE | Refills: 1 | Status: SHIPPED | OUTPATIENT
Start: 2023-11-26 | End: 2024-05-24

## 2023-12-12 ENCOUNTER — OFFICE VISIT (OUTPATIENT)
Facility: CLINIC | Age: 88
End: 2023-12-12
Payer: MEDICARE

## 2023-12-12 VITALS
HEIGHT: 66 IN | SYSTOLIC BLOOD PRESSURE: 150 MMHG | TEMPERATURE: 98.3 F | DIASTOLIC BLOOD PRESSURE: 90 MMHG | BODY MASS INDEX: 20.89 KG/M2 | OXYGEN SATURATION: 98 % | WEIGHT: 130 LBS | HEART RATE: 71 BPM

## 2023-12-12 DIAGNOSIS — D64.9 MILD ANEMIA: ICD-10-CM

## 2023-12-12 DIAGNOSIS — N18.31 STAGE 3A CHRONIC KIDNEY DISEASE (HCC): ICD-10-CM

## 2023-12-12 DIAGNOSIS — E03.9 HYPOTHYROIDISM, ADULT: ICD-10-CM

## 2023-12-12 DIAGNOSIS — I10 ESSENTIAL HYPERTENSION, BENIGN: Primary | ICD-10-CM

## 2023-12-12 DIAGNOSIS — I48.0 PAROXYSMAL A-FIB (HCC): ICD-10-CM

## 2023-12-12 PROCEDURE — G8484 FLU IMMUNIZE NO ADMIN: HCPCS | Performed by: INTERNAL MEDICINE

## 2023-12-12 PROCEDURE — 99214 OFFICE O/P EST MOD 30 MIN: CPT | Performed by: INTERNAL MEDICINE

## 2023-12-12 PROCEDURE — 1090F PRES/ABSN URINE INCON ASSESS: CPT | Performed by: INTERNAL MEDICINE

## 2023-12-12 PROCEDURE — 1036F TOBACCO NON-USER: CPT | Performed by: INTERNAL MEDICINE

## 2023-12-12 PROCEDURE — 1123F ACP DISCUSS/DSCN MKR DOCD: CPT | Performed by: INTERNAL MEDICINE

## 2023-12-12 PROCEDURE — G8420 CALC BMI NORM PARAMETERS: HCPCS | Performed by: INTERNAL MEDICINE

## 2023-12-12 PROCEDURE — G8427 DOCREV CUR MEDS BY ELIG CLIN: HCPCS | Performed by: INTERNAL MEDICINE

## 2023-12-12 RX ORDER — NAPROXEN SODIUM 220 MG
220 TABLET ORAL 2 TIMES DAILY WITH MEALS
COMMUNITY

## 2023-12-12 RX ORDER — VITAMIN B COMPLEX
1 CAPSULE ORAL DAILY
COMMUNITY

## 2023-12-12 NOTE — PROGRESS NOTES
Chief Complaint   Patient presents with    Discuss Labs    Follow-up Chronic Condition         1. \"Have you been to the ER, urgent care clinic since your last visit? Hospitalized since your last visit? \" No    2. \"Have you seen or consulted any other health care providers outside of the [address] [place] [address] since your last visit? \" No     3. For patients aged 43-73: Has the patient had a colonoscopy / FIT/ Cologuard? NA - based on age      If the patient is female:    4. For patients aged 43-66: Has the patient had a mammogram within the past 2 years? NA - based on age or sex      11. For patients aged 21-65: Has the patient had a pap smear?  NA - based on age or sex

## 2023-12-12 NOTE — PROGRESS NOTES
[facility]  [facility] [address]   Phone: [phone]      Bang Gilmore (: 1931) is a 80 y.o. female, established patient, here for evaluation of the following chief complaint(s):  Discuss Labs and Follow-up Chronic Condition     SUBJECTIVE/OBJECTIVE:  HPI:  Patient is here for follow up, she had  blood work with Dr. Narciso Shankar since her last visit. She chose to have quality of life and not aggressive treatment. She states that she is doing well and she does not need refills. Patient stated since her thyroid dose has been adjusted she did not have dizziness or fainting spells. She is taking thyroid medicine daily but not sure about the dose and did not. Bring the medication. She has not been in the ER or the hospital since her last visit. Patient is with her daughters Noé Dawkins and John Mcconnell. John Mcconnell informed mom is not on blood thinner currently.     No results found for: \"LABA1C\"   Hemoglobin   Date Value Ref Range Status   2023 11.3 (L) 11.5 - 16.0 g/dL Final     Hematocrit   Date Value Ref Range Status   2023 34.6 (L) 35.0 - 47.0 % Final     Creatinine   Date Value Ref Range Status   2023 1.11 (H) 0.55 - 1.02 mg/dL Final     Glucose   Date Value Ref Range Status   2023 94 65 - 100 mg/dL Final     TSH   Date Value Ref Range Status   2023 70.500 (H) 0.450 - 4.500 uIU/mL Final     Potassium   Date Value Ref Range Status   2023 3.7 3.5 - 5.1 mmol/L Final     Cholesterol, Total   Date Value Ref Range Status   2023 261 (H) 100 - 199 mg/dL Final     HDL   Date Value Ref Range Status   2023 71 >39 mg/dL Final     LDL Calculated   Date Value Ref Range Status   2023 171 (H) 0 - 99 mg/dL Final     Triglycerides   Date Value Ref Range Status   2023 109 0 - 149 mg/dL Final           Current Outpatient Medications   Medication Sig    b complex vitamins capsule Take 1 capsule by mouth

## 2024-01-11 LAB — TSH SERPL DL<=0.005 MIU/L-ACNC: 134 UIU/ML (ref 0.45–4.5)

## 2024-01-18 ENCOUNTER — TELEPHONE (OUTPATIENT)
Facility: CLINIC | Age: 89
End: 2024-01-18

## 2024-01-18 RX ORDER — LEVOTHYROXINE SODIUM 0.1 MG/1
100 TABLET ORAL DAILY
Qty: 90 TABLET | Refills: 1 | Status: SHIPPED | OUTPATIENT
Start: 2024-01-18

## 2024-01-18 NOTE — TELEPHONE ENCOUNTER
Patient's daughter dropped off medicines  Super B Complex  Vit D3 2000 iu   Tylenol Extra Strength  Calcium 600mg + Vit D3  Vitamin B12 1,000mcg  Irbesartan?HCTZ 150-12.5mg 1 QD  Aleve Liquid Gels  Furosemide 20mg 1 QD  Amiodarone 200mg 1 BID last date filled 8/22 almost full  Levothyroxine 100mcg 1 AM last date filled 08/22 almost full  Amlodipine 5mg QD  Gabapentin 300mg 1 HS Full  Eliquis 2.5mg 1 BID last date filled 08/22 almost full  Clonidine 0.1mg 1 HS Full

## 2024-02-09 DIAGNOSIS — E03.9 HYPOTHYROIDISM, ADULT: Primary | ICD-10-CM

## 2024-02-09 DIAGNOSIS — I10 ESSENTIAL HYPERTENSION, BENIGN: ICD-10-CM

## 2024-03-12 DIAGNOSIS — E03.9 HYPOTHYROIDISM, ADULT: ICD-10-CM

## 2024-03-26 ENCOUNTER — OFFICE VISIT (OUTPATIENT)
Facility: CLINIC | Age: 89
End: 2024-03-26
Payer: MEDICARE

## 2024-03-26 VITALS
HEART RATE: 68 BPM | WEIGHT: 130.4 LBS | RESPIRATION RATE: 18 BRPM | SYSTOLIC BLOOD PRESSURE: 165 MMHG | OXYGEN SATURATION: 98 % | BODY MASS INDEX: 20.96 KG/M2 | HEIGHT: 66 IN | TEMPERATURE: 97.7 F | DIASTOLIC BLOOD PRESSURE: 73 MMHG

## 2024-03-26 DIAGNOSIS — L03.114 CELLULITIS OF LEFT HAND: ICD-10-CM

## 2024-03-26 DIAGNOSIS — E03.9 HYPOTHYROIDISM, ADULT: ICD-10-CM

## 2024-03-26 DIAGNOSIS — R22.32 LOCALIZED SWELLING ON LEFT HAND: Primary | ICD-10-CM

## 2024-03-26 PROCEDURE — 1090F PRES/ABSN URINE INCON ASSESS: CPT | Performed by: INTERNAL MEDICINE

## 2024-03-26 PROCEDURE — 1036F TOBACCO NON-USER: CPT | Performed by: INTERNAL MEDICINE

## 2024-03-26 PROCEDURE — 1123F ACP DISCUSS/DSCN MKR DOCD: CPT | Performed by: INTERNAL MEDICINE

## 2024-03-26 PROCEDURE — G8484 FLU IMMUNIZE NO ADMIN: HCPCS | Performed by: INTERNAL MEDICINE

## 2024-03-26 PROCEDURE — G8420 CALC BMI NORM PARAMETERS: HCPCS | Performed by: INTERNAL MEDICINE

## 2024-03-26 PROCEDURE — G8427 DOCREV CUR MEDS BY ELIG CLIN: HCPCS | Performed by: INTERNAL MEDICINE

## 2024-03-26 PROCEDURE — 99213 OFFICE O/P EST LOW 20 MIN: CPT | Performed by: INTERNAL MEDICINE

## 2024-03-26 RX ORDER — CEPHALEXIN 500 MG/1
500 CAPSULE ORAL 2 TIMES DAILY
Qty: 14 CAPSULE | Refills: 0 | Status: SHIPPED | OUTPATIENT
Start: 2024-03-26

## 2024-03-26 ASSESSMENT — ENCOUNTER SYMPTOMS
NAUSEA: 0
ABDOMINAL PAIN: 0
DIARRHEA: 0
COUGH: 0
VOMITING: 0
SHORTNESS OF BREATH: 0

## 2024-03-26 ASSESSMENT — PATIENT HEALTH QUESTIONNAIRE - PHQ9
SUM OF ALL RESPONSES TO PHQ QUESTIONS 1-9: 0
SUM OF ALL RESPONSES TO PHQ QUESTIONS 1-9: 0
SUM OF ALL RESPONSES TO PHQ9 QUESTIONS 1 & 2: 0
2. FEELING DOWN, DEPRESSED OR HOPELESS: NOT AT ALL
SUM OF ALL RESPONSES TO PHQ QUESTIONS 1-9: 0
1. LITTLE INTEREST OR PLEASURE IN DOING THINGS: NOT AT ALL
SUM OF ALL RESPONSES TO PHQ QUESTIONS 1-9: 0

## 2024-03-26 NOTE — PROGRESS NOTES
[facility]  [address], [place] [address]  Phone: [phone]      Julee Mcclellan (: 1931) is a 89 y.o. female, established patient, here for evaluation of the following chief complaint(s):  Other ((L) arm pain started on  and swelling became noticeable last night.)       SUBJECTIVE/OBJECTIVE:  HPI:  89-year-old female with  history of hypothyroidism, anemia, paroxysmal A-fib is seen today for swelling in her left hand and wrist.  Patient is with her daughter Rajwinder and she stated mom noticed left hand discomfort on  when she woke up.  She thought the strength of her left hand was weak, had appointment with Dr. Gar yesterday and he checked her hand and advised to call if not better.  Patient stated today when she woke up she has swelling and redness of her left wrist and hand.  She denies any fall or injury to the left hand.,denies fever or chills.    No results found for: \"LABA1C\"   Hemoglobin   Date Value Ref Range Status   2023 11.3 (L) 11.5 - 16.0 g/dL Final     Hematocrit   Date Value Ref Range Status   2023 34.6 (L) 35.0 - 47.0 % Final     Creatinine   Date Value Ref Range Status   2023 1.11 (H) 0.55 - 1.02 mg/dL Final     Glucose   Date Value Ref Range Status   2023 94 65 - 100 mg/dL Final     TSH   Date Value Ref Range Status   01/10/2024 134.000 (H) 0.450 - 4.500 uIU/mL Final     Comment:     Results confirmed on  dilution.       Potassium   Date Value Ref Range Status   2023 3.7 3.5 - 5.1 mmol/L Final     Cholesterol, Total   Date Value Ref Range Status   2023 261 (H) 100 - 199 mg/dL Final     HDL   Date Value Ref Range Status   2023 71 >39 mg/dL Final     LDL Calculated   Date Value Ref Range Status   2023 171 (H) 0 - 99 mg/dL Final     Triglycerides   Date Value Ref Range Status   2023 109 0 - 149 mg/dL Final         Current Outpatient Medications   Medication Sig

## 2024-04-06 LAB — TSH SERPL DL<=0.005 MIU/L-ACNC: 9.66 UIU/ML (ref 0.45–4.5)

## 2024-04-10 ENCOUNTER — OFFICE VISIT (OUTPATIENT)
Facility: CLINIC | Age: 89
End: 2024-04-10
Payer: MEDICARE

## 2024-04-10 VITALS
TEMPERATURE: 97.4 F | OXYGEN SATURATION: 98 % | WEIGHT: 130 LBS | SYSTOLIC BLOOD PRESSURE: 130 MMHG | HEIGHT: 66 IN | BODY MASS INDEX: 20.89 KG/M2 | HEART RATE: 72 BPM | DIASTOLIC BLOOD PRESSURE: 74 MMHG

## 2024-04-10 DIAGNOSIS — I10 ESSENTIAL HYPERTENSION, BENIGN: ICD-10-CM

## 2024-04-10 DIAGNOSIS — Z00.00 MEDICARE ANNUAL WELLNESS VISIT, SUBSEQUENT: Primary | ICD-10-CM

## 2024-04-10 DIAGNOSIS — N18.31 STAGE 3A CHRONIC KIDNEY DISEASE (HCC): ICD-10-CM

## 2024-04-10 DIAGNOSIS — E03.9 HYPOTHYROIDISM, ADULT: ICD-10-CM

## 2024-04-10 DIAGNOSIS — D64.9 MILD ANEMIA: ICD-10-CM

## 2024-04-10 DIAGNOSIS — I48.0 PAROXYSMAL A-FIB (HCC): ICD-10-CM

## 2024-04-10 PROCEDURE — G8427 DOCREV CUR MEDS BY ELIG CLIN: HCPCS | Performed by: INTERNAL MEDICINE

## 2024-04-10 PROCEDURE — 99214 OFFICE O/P EST MOD 30 MIN: CPT | Performed by: INTERNAL MEDICINE

## 2024-04-10 PROCEDURE — 1123F ACP DISCUSS/DSCN MKR DOCD: CPT | Performed by: INTERNAL MEDICINE

## 2024-04-10 PROCEDURE — 1090F PRES/ABSN URINE INCON ASSESS: CPT | Performed by: INTERNAL MEDICINE

## 2024-04-10 PROCEDURE — G0439 PPPS, SUBSEQ VISIT: HCPCS | Performed by: INTERNAL MEDICINE

## 2024-04-10 PROCEDURE — G8420 CALC BMI NORM PARAMETERS: HCPCS | Performed by: INTERNAL MEDICINE

## 2024-04-10 PROCEDURE — 1036F TOBACCO NON-USER: CPT | Performed by: INTERNAL MEDICINE

## 2024-04-10 RX ORDER — LEVOTHYROXINE SODIUM 0.1 MG/1
TABLET ORAL
Qty: 97 TABLET | Refills: 1 | Status: SHIPPED | OUTPATIENT
Start: 2024-04-10

## 2024-04-10 RX ORDER — CLONIDINE HYDROCHLORIDE 0.1 MG/1
0.1 TABLET ORAL DAILY PRN
COMMUNITY

## 2024-04-10 RX ORDER — IRBESARTAN AND HYDROCHLOROTHIAZIDE 150; 12.5 MG/1; MG/1
1 TABLET, FILM COATED ORAL DAILY
COMMUNITY

## 2024-04-10 SDOH — ECONOMIC STABILITY: FOOD INSECURITY: WITHIN THE PAST 12 MONTHS, THE FOOD YOU BOUGHT JUST DIDN'T LAST AND YOU DIDN'T HAVE MONEY TO GET MORE.: NEVER TRUE

## 2024-04-10 SDOH — ECONOMIC STABILITY: INCOME INSECURITY: HOW HARD IS IT FOR YOU TO PAY FOR THE VERY BASICS LIKE FOOD, HOUSING, MEDICAL CARE, AND HEATING?: NOT HARD AT ALL

## 2024-04-10 SDOH — ECONOMIC STABILITY: FOOD INSECURITY: WITHIN THE PAST 12 MONTHS, YOU WORRIED THAT YOUR FOOD WOULD RUN OUT BEFORE YOU GOT MONEY TO BUY MORE.: NEVER TRUE

## 2024-04-10 ASSESSMENT — ANXIETY QUESTIONNAIRES
4. TROUBLE RELAXING: NOT AT ALL
6. BECOMING EASILY ANNOYED OR IRRITABLE: NOT AT ALL
3. WORRYING TOO MUCH ABOUT DIFFERENT THINGS: NOT AT ALL
2. NOT BEING ABLE TO STOP OR CONTROL WORRYING: NOT AT ALL
IF YOU CHECKED OFF ANY PROBLEMS ON THIS QUESTIONNAIRE, HOW DIFFICULT HAVE THESE PROBLEMS MADE IT FOR YOU TO DO YOUR WORK, TAKE CARE OF THINGS AT HOME, OR GET ALONG WITH OTHER PEOPLE: NOT DIFFICULT AT ALL
1. FEELING NERVOUS, ANXIOUS, OR ON EDGE: NOT AT ALL
GAD7 TOTAL SCORE: 0
7. FEELING AFRAID AS IF SOMETHING AWFUL MIGHT HAPPEN: NOT AT ALL

## 2024-04-10 ASSESSMENT — PATIENT HEALTH QUESTIONNAIRE - PHQ9
SUM OF ALL RESPONSES TO PHQ QUESTIONS 1-9: 0
2. FEELING DOWN, DEPRESSED OR HOPELESS: NOT AT ALL
SUM OF ALL RESPONSES TO PHQ9 QUESTIONS 1 & 2: 0
SUM OF ALL RESPONSES TO PHQ QUESTIONS 1-9: 0
1. LITTLE INTEREST OR PLEASURE IN DOING THINGS: NOT AT ALL
SUM OF ALL RESPONSES TO PHQ QUESTIONS 1-9: 0
SUM OF ALL RESPONSES TO PHQ QUESTIONS 1-9: 0

## 2024-04-10 NOTE — PROGRESS NOTES
Click Here for Release of Records Request         Click Here for Release of Records Request   
.[facility]  [address], [place] [address]  Phone: [phone]      Julee Mcclellan (: 1931) is a 89 y.o. female, established patient, here for evaluation of the following chief complaint(s):  Medicare AWV, Follow-up Chronic Condition, and Hypertension         SUBJECTIVE/OBJECTIVE:  HPI:  Patient is here for a Medicare Wellness Exam.  Patient is doing well and does not need any medication refills. She denies being seen at the Emergency Room or Hospital. She has not seen any providers since her last visit.     No results found for: \"LABA1C\"   Hemoglobin   Date Value Ref Range Status   2023 11.3 (L) 11.5 - 16.0 g/dL Final     Hematocrit   Date Value Ref Range Status   2023 34.6 (L) 35.0 - 47.0 % Final     Creatinine   Date Value Ref Range Status   2023 1.11 (H) 0.55 - 1.02 mg/dL Final     Glucose   Date Value Ref Range Status   2023 94 65 - 100 mg/dL Final     TSH   Date Value Ref Range Status   2024 9.660 (H) 0.450 - 4.500 uIU/mL Final     Potassium   Date Value Ref Range Status   2023 3.7 3.5 - 5.1 mmol/L Final     Cholesterol, Total   Date Value Ref Range Status   2023 261 (H) 100 - 199 mg/dL Final     HDL   Date Value Ref Range Status   2023 71 >39 mg/dL Final     LDL Calculated   Date Value Ref Range Status   2023 171 (H) 0 - 99 mg/dL Final     Triglycerides   Date Value Ref Range Status   2023 109 0 - 149 mg/dL Final     No results found for: \"HAV\", \"HEPAIGM\", \"HEPBIGM\", \"HEPBCAB\", \"HBEAG\", \"HEPCAB\"   MRI Result (most recent):  No results found for this or any previous visit from the past 3650 days.    CT Result (most recent):  CT HEAD WO CONTRAST 2023    Narrative  INDICATION: dizziness, fell to ground Reason for exam:->dizziness, fell to  ground    EXAM:  HEAD CT WITHOUT CONTRAST    COMPARISON: 2023    TECHNIQUE:  Routine noncontrast axial head CT was performed.  
were made and/or referrals ordered.    Positive Risk Factor Screenings with Interventions:    Fall Risk:  Do you feel unsteady or are you worried about falling? : no  2 or more falls in past year?: (!) yes  Fall with injury in past year?: no     Interventions:    Reviewed medications, home hazards, visual acuity, and co-morbidities that can increase risk for falls  Advised fall precautions              Dentist Screen:  Have you seen the dentist within the past year?: (!) No    Intervention:  Advised to schedule with their dentist                      Objective   Vitals:    04/10/24 1342   BP: 130/74   Site: Right Upper Arm   Position: Sitting   Cuff Size: Medium Adult   Pulse: 72   Temp: 97.4 °F (36.3 °C)   TempSrc: Temporal   SpO2: 98%   Weight: 59 kg (130 lb)   Height: 1.676 m (5' 6\")      Body mass index is 20.98 kg/m².   Physical Exam  Constitutional:       Appearance: Elderly patient with her daughter Rajwinder.   HENT:      Head: Normocephalic and atraumatic.      Right Ear: External ear normal.      Left Ear: External ear normal.      Nose: Nose normal.      Mouth/Throat:      Mouth: Mucous membranes are moist.   Eyes:      Extraocular Movements: Extraocular movements intact.      Pupils: Pupils are equal, round, and reactive to light.   Cardiovascular:      Rate and Rhythm: Normal rate and regular rhythm.   Pulmonary:      Effort: Pulmonary effort is normal.      Breath sounds: Normal breath sounds.   Abdominal:      Palpations: Abdomen is soft.      Tenderness: There is no abdominal tenderness.   Musculoskeletal:      Cervical back: Normal range of motion and neck supple.      Right  and Left lower leg: Lymphedema bilateral legs, has support stockings     Skin:     General: Skin is warm and dry.   Neurological:      General: No focal deficit present.      Mental Status: She   is alert and oriented to person, place, and time.   Psychiatric:         Mood and Affect: Mood normal.              Allergies

## 2024-04-24 ENCOUNTER — OFFICE VISIT (OUTPATIENT)
Facility: CLINIC | Age: 89
End: 2024-04-24

## 2024-04-24 VITALS
SYSTOLIC BLOOD PRESSURE: 150 MMHG | DIASTOLIC BLOOD PRESSURE: 60 MMHG | HEART RATE: 68 BPM | HEIGHT: 66 IN | BODY MASS INDEX: 20.57 KG/M2 | OXYGEN SATURATION: 97 % | WEIGHT: 128 LBS | TEMPERATURE: 97.5 F

## 2024-04-24 DIAGNOSIS — I10 BENIGN ESSENTIAL HYPERTENSION: Primary | ICD-10-CM

## 2024-04-24 DIAGNOSIS — R42 DIZZINESS: ICD-10-CM

## 2024-04-24 NOTE — ASSESSMENT & PLAN NOTE
BP at home was high at 210 initially,then got better to 147/62 at home on 4/24/24. BP at the office with patient's machine is 153/63,HR 62 and with the office machine is 150/60,HR 60 .  Discussed with the patient and daughter, advised to continue low-sodium diet, amlodipine carb, irbesartan HCTZ.  Advised to take clonidine if blood pressure goes more than 170.

## 2024-04-24 NOTE — PROGRESS NOTES
[facility]  [address], [place] [address]  Phone: [phone]      Julee Mcclellan (: 1931) is a 89 y.o. female, established patient, here for evaluation of the following chief complaint(s):  Hypertension (BP at home today 200/? /Taking medication. Denies any headaches. )     SUBJECTIVE/OBJECTIVE:  HPI:  89-year-old female with  past medical history of hypertension, hypothyroidism, paroxysmal A-fib is seen today for elevated blood pressure at home.  Patient is with her daughter Rajwinder Wing.  Patient informed she had mild dizziness yesterday and today she checked her blood pressure and it was 210 initially.  When she rechecked blood pressure it was 147/62 and her blood pressure machine is a new machine.  She stated her dizziness has resolved  and she denies any headache, chest pain or shortness of breath    No results found for: \"LABA1C\"   Hemoglobin   Date Value Ref Range Status   2023 11.3 (L) 11.5 - 16.0 g/dL Final     Hematocrit   Date Value Ref Range Status   2023 34.6 (L) 35.0 - 47.0 % Final     Creatinine   Date Value Ref Range Status   2023 1.11 (H) 0.55 - 1.02 mg/dL Final     Glucose   Date Value Ref Range Status   2023 94 65 - 100 mg/dL Final     TSH   Date Value Ref Range Status   2024 9.660 (H) 0.450 - 4.500 uIU/mL Final     Potassium   Date Value Ref Range Status   2023 3.7 3.5 - 5.1 mmol/L Final     Cholesterol, Total   Date Value Ref Range Status   2023 261 (H) 100 - 199 mg/dL Final     HDL   Date Value Ref Range Status   2023 71 >39 mg/dL Final     LDL Calculated   Date Value Ref Range Status   2023 171 (H) 0 - 99 mg/dL Final     Triglycerides   Date Value Ref Range Status   2023 109 0 - 149 mg/dL Final     No results found for: \"HAV\", \"HEPAIGM\", \"HEPBIGM\", \"HEPBCAB\", \"HBEAG\", \"HEPCAB\"       Current Outpatient Medications   Medication Sig    cloNIDine (CATAPRES) 0.1 MG

## 2024-04-24 NOTE — PROGRESS NOTES
.  Chief Complaint   Patient presents with    Hypertension     BP at home today 200/?   Taking medication. Denies any headaches.          .  \"Have you been to the ER, urgent care clinic since your last visit?  Hospitalized since your last visit?\"    NO    “Have you seen or consulted any other health care providers outside of [facility] since your last visit?”    NO      .BP (!) 146/80 (Site: Right Upper Arm, Position: Sitting, Cuff Size: Medium Adult)   Pulse 68   Temp 97.5 °F (36.4 °C) (Oral)   Ht 1.676 m (5' 6\")   Wt 58.1 kg (128 lb)   SpO2 97%   BMI 20.66 kg/m²         Click Here for Release of Records Request

## 2024-05-13 DIAGNOSIS — M54.31 SCIATICA OF RIGHT SIDE: ICD-10-CM

## 2024-05-14 RX ORDER — GABAPENTIN 300 MG/1
300 CAPSULE ORAL NIGHTLY PRN
Qty: 90 CAPSULE | Refills: 0 | Status: SHIPPED | OUTPATIENT
Start: 2024-05-14 | End: 2024-08-12

## 2024-05-14 RX ORDER — IRBESARTAN AND HYDROCHLOROTHIAZIDE 150; 12.5 MG/1; MG/1
1 TABLET, FILM COATED ORAL DAILY
Qty: 90 TABLET | Refills: 1 | Status: SHIPPED | OUTPATIENT
Start: 2024-05-14

## 2024-06-10 DIAGNOSIS — E03.9 HYPOTHYROIDISM, ADULT: ICD-10-CM

## 2024-07-18 ENCOUNTER — APPOINTMENT (OUTPATIENT)
Facility: HOSPITAL | Age: 89
DRG: 061 | End: 2024-07-18
Payer: MEDICARE

## 2024-07-18 ENCOUNTER — APPOINTMENT (OUTPATIENT)
Facility: HOSPITAL | Age: 89
DRG: 061 | End: 2024-07-18
Attending: INTERNAL MEDICINE
Payer: MEDICARE

## 2024-07-18 ENCOUNTER — HOSPITAL ENCOUNTER (INPATIENT)
Facility: HOSPITAL | Age: 89
LOS: 4 days | Discharge: HOME HEALTH | DRG: 061 | End: 2024-07-22
Attending: STUDENT IN AN ORGANIZED HEALTH CARE EDUCATION/TRAINING PROGRAM | Admitting: INTERNAL MEDICINE
Payer: MEDICARE

## 2024-07-18 DIAGNOSIS — I63.9 CEREBROVASCULAR ACCIDENT (CVA), UNSPECIFIED MECHANISM (HCC): ICD-10-CM

## 2024-07-18 DIAGNOSIS — R40.4 TRANSIENT ALTERATION OF AWARENESS: Primary | ICD-10-CM

## 2024-07-18 PROBLEM — R41.82 AMS (ALTERED MENTAL STATUS): Status: ACTIVE | Noted: 2024-07-18

## 2024-07-18 PROBLEM — G45.9 TIA (TRANSIENT ISCHEMIC ATTACK): Status: ACTIVE | Noted: 2024-07-18

## 2024-07-18 LAB
ALBUMIN SERPL-MCNC: 3.1 G/DL (ref 3.5–5)
ALBUMIN/GLOB SERPL: 0.7 (ref 1.1–2.2)
ALP SERPL-CCNC: 83 U/L (ref 45–117)
ALT SERPL-CCNC: 19 U/L (ref 12–78)
AMPHET UR QL SCN: NEGATIVE
ANION GAP SERPL CALC-SCNC: 5 MMOL/L (ref 5–15)
APPEARANCE UR: CLEAR
AST SERPL W P-5'-P-CCNC: 29 U/L (ref 15–37)
BACTERIA URNS QL MICRO: NEGATIVE /HPF
BARBITURATES UR QL SCN: NEGATIVE
BASOPHILS # BLD: 0 K/UL (ref 0–0.1)
BASOPHILS NFR BLD: 0 % (ref 0–1)
BENZODIAZ UR QL: NEGATIVE
BILIRUB SERPL-MCNC: 0.6 MG/DL (ref 0.2–1)
BILIRUB UR QL: NEGATIVE
BUN SERPL-MCNC: 21 MG/DL (ref 6–20)
BUN/CREAT SERPL: 14 (ref 12–20)
BUPRENORPHINE UR QL: NEGATIVE
CA-I BLD-MCNC: 9.2 MG/DL (ref 8.5–10.1)
CANNABINOIDS UR QL SCN: NEGATIVE
CHLORIDE SERPL-SCNC: 104 MMOL/L (ref 97–108)
CO2 SERPL-SCNC: 30 MMOL/L (ref 21–32)
COCAINE UR QL SCN: NEGATIVE
COLOR UR: YELLOW
CREAT SERPL-MCNC: 1.48 MG/DL (ref 0.55–1.02)
DIFFERENTIAL METHOD BLD: NORMAL
EKG ATRIAL RATE: 66 BPM
EKG DIAGNOSIS: NORMAL
EKG P AXIS: 59 DEGREES
EKG P-R INTERVAL: 166 MS
EKG Q-T INTERVAL: 432 MS
EKG QRS DURATION: 76 MS
EKG QTC CALCULATION (BAZETT): 452 MS
EKG R AXIS: -37 DEGREES
EKG T AXIS: 64 DEGREES
EKG VENTRICULAR RATE: 66 BPM
EOSINOPHIL # BLD: 0.2 K/UL (ref 0–0.4)
EOSINOPHIL NFR BLD: 3 % (ref 0–7)
EPITH CASTS URNS QL MICRO: ABNORMAL /LPF
ERYTHROCYTE [DISTWIDTH] IN BLOOD BY AUTOMATED COUNT: 14 % (ref 11.5–14.5)
GLOBULIN SER CALC-MCNC: 4.5 G/DL (ref 2–4)
GLUCOSE SERPL-MCNC: 109 MG/DL (ref 65–100)
GLUCOSE UR STRIP.AUTO-MCNC: NEGATIVE MG/DL
HCT VFR BLD AUTO: 39.5 % (ref 35–47)
HGB BLD-MCNC: 13.3 G/DL (ref 11.5–16)
HGB UR QL STRIP: NEGATIVE
IMM GRANULOCYTES # BLD AUTO: 0 K/UL (ref 0–0.04)
IMM GRANULOCYTES NFR BLD AUTO: 0 % (ref 0–0.5)
INR PPP: 1.1 (ref 0.9–1.1)
KETONES UR QL STRIP.AUTO: NEGATIVE MG/DL
LEUKOCYTE ESTERASE UR QL STRIP.AUTO: NEGATIVE
LYMPHOCYTES # BLD: 2 K/UL (ref 0.8–3.5)
LYMPHOCYTES NFR BLD: 27 % (ref 12–49)
Lab: NORMAL
MCH RBC QN AUTO: 32.2 PG (ref 26–34)
MCHC RBC AUTO-ENTMCNC: 33.7 G/DL (ref 30–36.5)
MCV RBC AUTO: 95.6 FL (ref 80–99)
METHADONE UR QL: NEGATIVE
METHAMPHET UR QL: NEGATIVE
MONOCYTES # BLD: 0.6 K/UL (ref 0–1)
MONOCYTES NFR BLD: 8 % (ref 5–13)
NEUTS SEG # BLD: 4.6 K/UL (ref 1.8–8)
NEUTS SEG NFR BLD: 62 % (ref 32–75)
NITRITE UR QL STRIP.AUTO: NEGATIVE
OPIATES UR QL: NEGATIVE
OXYCODONE UR QL SCN: NEGATIVE
PCP UR QL: NEGATIVE
PH UR STRIP: 7 (ref 5–8)
PLATELET # BLD AUTO: 187 K/UL (ref 150–400)
PMV BLD AUTO: 12.4 FL (ref 8.9–12.9)
POTASSIUM SERPL-SCNC: 5.1 MMOL/L (ref 3.5–5.1)
PROT SERPL-MCNC: 7.6 G/DL (ref 6.4–8.2)
PROT UR STRIP-MCNC: NEGATIVE MG/DL
PROTHROMBIN TIME: 10.4 SEC (ref 9–11.1)
RBC # BLD AUTO: 4.13 M/UL (ref 3.8–5.2)
RBC #/AREA URNS HPF: ABNORMAL /HPF (ref 0–5)
SODIUM SERPL-SCNC: 139 MMOL/L (ref 136–145)
SP GR UR REFRACTOMETRY: 1.01 (ref 1–1.03)
TRICYCLICS UR QL: NEGATIVE
TROPONIN I SERPL HS-MCNC: 11 NG/L (ref 0–51)
TROPONIN I SERPL HS-MCNC: 9 NG/L (ref 0–51)
URINE CULTURE IF INDICATED: ABNORMAL
UROBILINOGEN UR QL STRIP.AUTO: 0.1 EU/DL (ref 0.2–1)
WBC # BLD AUTO: 7.5 K/UL (ref 3.6–11)
WBC URNS QL MICRO: ABNORMAL /HPF (ref 0–4)

## 2024-07-18 PROCEDURE — 94761 N-INVAS EAR/PLS OXIMETRY MLT: CPT

## 2024-07-18 PROCEDURE — 2580000003 HC RX 258: Performed by: INTERNAL MEDICINE

## 2024-07-18 PROCEDURE — 80053 COMPREHEN METABOLIC PANEL: CPT

## 2024-07-18 PROCEDURE — 6360000002 HC RX W HCPCS: Performed by: INTERNAL MEDICINE

## 2024-07-18 PROCEDURE — 81001 URINALYSIS AUTO W/SCOPE: CPT

## 2024-07-18 PROCEDURE — 99285 EMERGENCY DEPT VISIT HI MDM: CPT

## 2024-07-18 PROCEDURE — 6360000004 HC RX CONTRAST MEDICATION: Performed by: STUDENT IN AN ORGANIZED HEALTH CARE EDUCATION/TRAINING PROGRAM

## 2024-07-18 PROCEDURE — 85610 PROTHROMBIN TIME: CPT

## 2024-07-18 PROCEDURE — 6370000000 HC RX 637 (ALT 250 FOR IP): Performed by: INTERNAL MEDICINE

## 2024-07-18 PROCEDURE — 70498 CT ANGIOGRAPHY NECK: CPT

## 2024-07-18 PROCEDURE — 4A03X5D MEASUREMENT OF ARTERIAL FLOW, INTRACRANIAL, EXTERNAL APPROACH: ICD-10-PCS | Performed by: STUDENT IN AN ORGANIZED HEALTH CARE EDUCATION/TRAINING PROGRAM

## 2024-07-18 PROCEDURE — 3E03317 INTRODUCTION OF OTHER THROMBOLYTIC INTO PERIPHERAL VEIN, PERCUTANEOUS APPROACH: ICD-10-PCS | Performed by: STUDENT IN AN ORGANIZED HEALTH CARE EDUCATION/TRAINING PROGRAM

## 2024-07-18 PROCEDURE — 80307 DRUG TEST PRSMV CHEM ANLYZR: CPT

## 2024-07-18 PROCEDURE — 84484 ASSAY OF TROPONIN QUANT: CPT

## 2024-07-18 PROCEDURE — 85025 COMPLETE CBC W/AUTO DIFF WBC: CPT

## 2024-07-18 PROCEDURE — 93005 ELECTROCARDIOGRAM TRACING: CPT | Performed by: STUDENT IN AN ORGANIZED HEALTH CARE EDUCATION/TRAINING PROGRAM

## 2024-07-18 PROCEDURE — 1100000000 HC RM PRIVATE

## 2024-07-18 PROCEDURE — 70450 CT HEAD/BRAIN W/O DYE: CPT

## 2024-07-18 PROCEDURE — 36415 COLL VENOUS BLD VENIPUNCTURE: CPT

## 2024-07-18 RX ORDER — SODIUM CHLORIDE 9 MG/ML
INJECTION, SOLUTION INTRAVENOUS PRN
Status: DISCONTINUED | OUTPATIENT
Start: 2024-07-18 | End: 2024-07-22 | Stop reason: HOSPADM

## 2024-07-18 RX ORDER — ACETAMINOPHEN 325 MG/1
650 TABLET ORAL EVERY 4 HOURS PRN
Status: DISCONTINUED | OUTPATIENT
Start: 2024-07-18 | End: 2024-07-22 | Stop reason: HOSPADM

## 2024-07-18 RX ORDER — HEPARIN SODIUM 5000 [USP'U]/ML
5000 INJECTION, SOLUTION INTRAVENOUS; SUBCUTANEOUS EVERY 8 HOURS SCHEDULED
Status: DISCONTINUED | OUTPATIENT
Start: 2024-07-18 | End: 2024-07-22 | Stop reason: HOSPADM

## 2024-07-18 RX ORDER — ACETAMINOPHEN 650 MG/1
650 SUPPOSITORY RECTAL EVERY 4 HOURS PRN
Status: DISCONTINUED | OUTPATIENT
Start: 2024-07-18 | End: 2024-07-22 | Stop reason: HOSPADM

## 2024-07-18 RX ORDER — ONDANSETRON 2 MG/ML
4 INJECTION INTRAMUSCULAR; INTRAVENOUS EVERY 6 HOURS PRN
Status: DISCONTINUED | OUTPATIENT
Start: 2024-07-18 | End: 2024-07-22 | Stop reason: HOSPADM

## 2024-07-18 RX ORDER — ATORVASTATIN CALCIUM 40 MG/1
40 TABLET, FILM COATED ORAL NIGHTLY
Status: DISCONTINUED | OUTPATIENT
Start: 2024-07-18 | End: 2024-07-22 | Stop reason: HOSPADM

## 2024-07-18 RX ORDER — POLYETHYLENE GLYCOL 3350 17 G/17G
17 POWDER, FOR SOLUTION ORAL DAILY PRN
Status: DISCONTINUED | OUTPATIENT
Start: 2024-07-18 | End: 2024-07-22 | Stop reason: HOSPADM

## 2024-07-18 RX ORDER — ASPIRIN 300 MG/1
300 SUPPOSITORY RECTAL DAILY
Status: DISCONTINUED | OUTPATIENT
Start: 2024-07-18 | End: 2024-07-22 | Stop reason: HOSPADM

## 2024-07-18 RX ORDER — SODIUM CHLORIDE 0.9 % (FLUSH) 0.9 %
5-40 SYRINGE (ML) INJECTION EVERY 12 HOURS SCHEDULED
Status: DISCONTINUED | OUTPATIENT
Start: 2024-07-18 | End: 2024-07-22 | Stop reason: HOSPADM

## 2024-07-18 RX ORDER — ASPIRIN 81 MG/1
81 TABLET, CHEWABLE ORAL DAILY
Status: DISCONTINUED | OUTPATIENT
Start: 2024-07-18 | End: 2024-07-22 | Stop reason: HOSPADM

## 2024-07-18 RX ORDER — SODIUM CHLORIDE, SODIUM LACTATE, POTASSIUM CHLORIDE, CALCIUM CHLORIDE 600; 310; 30; 20 MG/100ML; MG/100ML; MG/100ML; MG/100ML
INJECTION, SOLUTION INTRAVENOUS CONTINUOUS
Status: DISCONTINUED | OUTPATIENT
Start: 2024-07-18 | End: 2024-07-21

## 2024-07-18 RX ORDER — IRBESARTAN AND HYDROCHLOROTHIAZIDE 150; 12.5 MG/1; MG/1
1 TABLET, FILM COATED ORAL DAILY
Status: CANCELLED | OUTPATIENT
Start: 2024-07-18

## 2024-07-18 RX ORDER — SODIUM CHLORIDE 9 MG/ML
INJECTION, SOLUTION INTRAVENOUS CONTINUOUS
Status: DISCONTINUED | OUTPATIENT
Start: 2024-07-18 | End: 2024-07-18

## 2024-07-18 RX ORDER — SODIUM CHLORIDE 0.9 % (FLUSH) 0.9 %
5-40 SYRINGE (ML) INJECTION PRN
Status: DISCONTINUED | OUTPATIENT
Start: 2024-07-18 | End: 2024-07-22 | Stop reason: HOSPADM

## 2024-07-18 RX ORDER — LABETALOL HYDROCHLORIDE 5 MG/ML
5 INJECTION, SOLUTION INTRAVENOUS EVERY 6 HOURS PRN
Status: DISCONTINUED | OUTPATIENT
Start: 2024-07-18 | End: 2024-07-22 | Stop reason: HOSPADM

## 2024-07-18 RX ADMIN — ATORVASTATIN CALCIUM 40 MG: 40 TABLET, FILM COATED ORAL at 21:43

## 2024-07-18 RX ADMIN — IOPAMIDOL 100 ML: 755 INJECTION, SOLUTION INTRAVENOUS at 14:16

## 2024-07-18 RX ADMIN — HEPARIN SODIUM 5000 UNITS: 5000 INJECTION INTRAVENOUS; SUBCUTANEOUS at 21:43

## 2024-07-18 RX ADMIN — HEPARIN SODIUM 5000 UNITS: 5000 INJECTION INTRAVENOUS; SUBCUTANEOUS at 16:55

## 2024-07-18 RX ADMIN — SODIUM CHLORIDE, PRESERVATIVE FREE 10 ML: 5 INJECTION INTRAVENOUS at 21:44

## 2024-07-18 RX ADMIN — ASPIRIN 81 MG 81 MG: 81 TABLET ORAL at 16:54

## 2024-07-18 RX ADMIN — SODIUM CHLORIDE, POTASSIUM CHLORIDE, SODIUM LACTATE AND CALCIUM CHLORIDE: 600; 310; 30; 20 INJECTION, SOLUTION INTRAVENOUS at 18:43

## 2024-07-18 NOTE — ED TRIAGE NOTES
PT. TO ED VIA EMS FROM HOME WITH C/O UNRESPONSIVE.  FAMILY STATES PT. WAS AWAKE  DURING THE NIGHT BECAUSE OF STORM.  PT. ATE BREAKFAST AND BEEN NON VERBAL SINCE.  BS 96.

## 2024-07-18 NOTE — H&P
\"TROPONINT\"  Lactic Acid: No results for input(s): \"LACTA\" in the last 72 hours.  BNP: No results for input(s): \"PROBNP\" in the last 72 hours.  UA:  Lab Results   Component Value Date/Time    NITRU Negative 07/18/2024 02:20 PM    COLORU Yellow 07/18/2024 02:20 PM    PHUR 7.0 07/18/2024 02:20 PM    WBCUA 0-4 07/18/2024 02:20 PM    RBCUA 0-5 07/18/2024 02:20 PM    BACTERIA Negative 07/18/2024 02:20 PM    LEUKOCYTESUR Negative 07/18/2024 02:20 PM    UROBILINOGEN 0.1 07/18/2024 02:20 PM    BILIRUBINUR Negative 07/18/2024 02:20 PM    BLOODU Negative 07/18/2024 02:20 PM    GLUCOSEU Negative 07/18/2024 02:20 PM    GLUCOSEU Negative 08/19/2023 09:07 PM    KETUA Negative 07/18/2024 02:20 PM     Urine Cultures: No results found for: \"LABURIN\"  Blood Cultures: No results found for: \"BC\"  No results found for: \"BLOODCULT2\"  Organism: No results found for: \"ORG\"    Imaging/Diagnostics Last 24 Hours   CTA HEAD NECK W CONTRAST    Result Date: 7/18/2024  EXAM:  CTA CODE NEURO HEAD AND NECK W CONT INDICATION:   Stroke COMPARISON: Same date 7/18/2024 CT head. CONTRAST: 100 mL of Isovue-370. TECHNIQUE:  Unenhanced  images were obtained to localize the volume for acquisition.  Multislice helical axial CT angiography was performed from the aortic arch to the top of the head during uneventful rapid bolus intravenous contrast administration.   Coronal and sagittal reformations and 3D/MIP  post processing were performed.  CT dose reduction was achieved through use of a standardized protocol tailored for this examination and automatic exposure control for dose modulation. This study was analyzed by the [employer] algorithm. FINDINGS: VESSELS: Intracranial:  There is no evidence of large vessel occlusion in the anterior circulation. No high-grade stenosis of the intracranial ICAs, MCAs or ACAs. Calcification of the bilateral carotid siphons without significant stenosis.. There is patent  anterior communicating artery. Fetal takeoff of the

## 2024-07-18 NOTE — ED PROVIDER NOTES
[facility] EMERGENCY DEPARTMENT  EMERGENCY DEPARTMENT HISTORY AND PHYSICAL EXAM      Date: 7/18/2024  Patient Name: Julee Mcclellan  MRN: [id number]  Birthdate 5/9/1931  Date of evaluation: 7/18/2024  Provider: Nicolás Mcintyre MD   Note Started: 3:44 PM EDT 7/18/24    HISTORY OF PRESENT ILLNESS     Chief Complaint   Patient presents with    Altered Mental Status       History Provided By: EMS    HPI: Julee Mcclellan is a 89 y.o. female with past medical history as reviewed below presents for evaluation of altered mental status.  Last known well 9:30 AM.  Patient normally talkative and is now not speaking.  No report of trauma.  History limited due to patient condition.  EMS reports patient following commands to squeeze hands, but nothing else    PAST MEDICAL HISTORY   Past Medical History:  Past Medical History:   Diagnosis Date    Benign essential hypertension     Breast cancer (HCC)     left side-2005    Closed nondisplaced oblique fracture of shaft of right fibula with routine healing, subsequent encounter     Constipation in female     Elevated glucose     Elevated serum creatinine     Hypercholesteremia     Hyperthyroidism     Hypothyroidism, adult     Mild anemia     Sciatica of right side     Smoldering multiple myeloma        Past Surgical History:  Past Surgical History:   Procedure Laterality Date    CYST REMOVAL      2/2004    HYSTERECTOMY (CERVIX STATUS UNKNOWN)      age 68    IR ASP BONE MARROW  12/15/2017    Dr. Arana    MASTECTOMY Left     2005       Family History:  Family History   Problem Relation Age of Onset    Colon Cancer Son     Cancer Maternal Grandfather         bone    Stroke Father     Heart Attack Maternal Grandmother        Social History:  Social History     Tobacco Use    Smoking status: Never    Smokeless tobacco: Never   Vaping Use    Vaping Use: Never used   Substance Use Topics    Alcohol use: Never    Drug use: Never       Allergies:  Allergies   Allergen Reactions

## 2024-07-18 NOTE — ED NOTES
PT. TRANSFERRED TO [facility], ROOM # 567.  PT. SISTER AT BEDSIDE.

## 2024-07-18 NOTE — ED NOTES
ED TO INPATIENT SBAR HANDOFF    Patient Name: Julee Mcclellan   Preferred Name: Julee  : 1931  89 y.o.   Family/Caregiver Present: no   Code Status Order: Full Code  PO Status: NPO:YES  Telemetry Order:   C-SSRS: Risk of Suicide: No Risk  Sitter no   Restraints:     Sepsis Risk Score      Situation  Chief Complaint   Patient presents with    Altered Mental Status     Brief Description of Patient's Condition: GOOD PT. ALERT, SITTING UP IN BED EATING CRACKERS  Mental Status: oriented and alert  Arrived from:Home  Imaging:   CTA HEAD NECK W CONTRAST   Final Result   1. CTA head demonstrates no large vessel occlusion, high-grade stenosis or   aneurysm.   2. CTA neck demonstrates no large vessel occlusion, high-grade stenosis or   aneurysm.      Others:   3. 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   4. Cervical spondylosis with associated high-grade C5-C6 spinal canal narrowing   and high-grade neuroforaminal narrowing at C5-C7 as detailed.         Electronically signed by MICHOACANO BILL      CT HEAD WO CONTRAST   Final Result   No evidence of acute process.            Electronically signed by QUYNH DAS      MRI brain without contrast ( REVIEW IMAGING OBTAINED IN LAST 2 YRS to determine indication)    (Results Pending)   Vascular duplex carotid bilateral    (Results Pending)     Abnormal labs:   Abnormal Labs Reviewed   COMPREHENSIVE METABOLIC PANEL - Abnormal; Notable for the following components:       Result Value    Glucose 109 (*)     BUN 21 (*)     Creatinine 1.48 (*)     Est, Glom Filt Rate 33 (*)     Albumin 3.1 (*)     Globulin 4.5 (*)     Albumin/Globulin Ratio 0.7 (*)     All other components within normal limits   URINALYSIS WITH REFLEX TO CULTURE - Abnormal; Notable for the following components:    Urobilinogen, Urine 0.1 (*)     All other components within normal limits       Background  Allergies:   Allergies

## 2024-07-19 ENCOUNTER — APPOINTMENT (OUTPATIENT)
Facility: HOSPITAL | Age: 89
DRG: 061 | End: 2024-07-19
Attending: INTERNAL MEDICINE
Payer: MEDICARE

## 2024-07-19 ENCOUNTER — APPOINTMENT (OUTPATIENT)
Facility: HOSPITAL | Age: 89
DRG: 061 | End: 2024-07-19
Payer: MEDICARE

## 2024-07-19 LAB
ANION GAP SERPL CALC-SCNC: 5 MMOL/L (ref 5–15)
BUN SERPL-MCNC: 17 MG/DL (ref 6–20)
BUN/CREAT SERPL: 11 (ref 12–20)
CA-I BLD-MCNC: 9.4 MG/DL (ref 8.5–10.1)
CHLORIDE SERPL-SCNC: 106 MMOL/L (ref 97–108)
CHOLEST SERPL-MCNC: 210 MG/DL
CO2 SERPL-SCNC: 27 MMOL/L (ref 21–32)
CREAT SERPL-MCNC: 1.51 MG/DL (ref 0.55–1.02)
ECHO AO ASC DIAM: 2.9 CM
ECHO AO ASCENDING AORTA INDEX: 1.74 CM/M2
ECHO AO ROOT DIAM: 2.7 CM
ECHO AO ROOT INDEX: 1.62 CM/M2
ECHO AR MAX VEL PISA: 1.3 M/S
ECHO EST RA PRESSURE: 3 MMHG
ECHO LA AREA 4C: 12.7 CM2
ECHO LA DIAMETER INDEX: 2.04 CM/M2
ECHO LA DIAMETER: 3.4 CM
ECHO LA MAJOR AXIS: 4.8 CM
ECHO LA TO AORTIC ROOT RATIO: 1.26
ECHO LA VOL MOD A4C: 27 ML (ref 22–52)
ECHO LA VOLUME INDEX MOD A4C: 16 ML/M2 (ref 16–34)
ECHO LV E' LATERAL VELOCITY: 9 CM/S
ECHO LV E' SEPTAL VELOCITY: 9 CM/S
ECHO LV EDV A4C: 65 ML
ECHO LV EDV INDEX A4C: 39 ML/M2
ECHO LV EJECTION FRACTION A4C: 57 %
ECHO LV ESV A4C: 28 ML
ECHO LV ESV INDEX A4C: 17 ML/M2
ECHO LV FRACTIONAL SHORTENING: 32 % (ref 28–44)
ECHO LV INTERNAL DIMENSION DIASTOLE INDEX: 2.46 CM/M2
ECHO LV INTERNAL DIMENSION DIASTOLIC: 4.1 CM (ref 3.9–5.3)
ECHO LV INTERNAL DIMENSION SYSTOLIC INDEX: 1.68 CM/M2
ECHO LV INTERNAL DIMENSION SYSTOLIC: 2.8 CM
ECHO LV IVSD: 1 CM (ref 0.6–0.9)
ECHO LV MASS 2D: 151.3 G (ref 67–162)
ECHO LV MASS INDEX 2D: 90.6 G/M2 (ref 43–95)
ECHO LV POSTERIOR WALL DIASTOLIC: 1.2 CM (ref 0.6–0.9)
ECHO LV RELATIVE WALL THICKNESS RATIO: 0.59
ECHO LVOT AREA: 2.3 CM2
ECHO LVOT DIAM: 1.7 CM
ECHO LVOT MEAN GRADIENT: 2 MMHG
ECHO LVOT PEAK GRADIENT: 5 MMHG
ECHO LVOT PEAK VELOCITY: 1.1 M/S
ECHO LVOT STROKE VOLUME INDEX: 42.4 ML/M2
ECHO LVOT SV: 70.8 ML
ECHO LVOT VTI: 31.2 CM
ECHO MV A VELOCITY: 0.96 M/S
ECHO MV AREA VTI: 1.8 CM2
ECHO MV E DECELERATION TIME (DT): 231 MS
ECHO MV E VELOCITY: 0.9 M/S
ECHO MV E/A RATIO: 0.94
ECHO MV E/E' LATERAL: 10
ECHO MV E/E' RATIO (AVERAGED): 10
ECHO MV E/E' SEPTAL: 10
ECHO MV LVOT VTI INDEX: 1.28
ECHO MV MAX VELOCITY: 1.1 M/S
ECHO MV MEAN GRADIENT: 2 MMHG
ECHO MV MEAN VELOCITY: 0.6 M/S
ECHO MV PEAK GRADIENT: 5 MMHG
ECHO MV REGURGITANT PEAK GRADIENT: 36 MMHG
ECHO MV REGURGITANT PEAK VELOCITY: 3 M/S
ECHO MV VTI: 39.9 CM
ECHO PULMONARY ARTERY END DIASTOLIC PRESSURE: 4 MMHG
ECHO PV MAX VELOCITY: 0.7 M/S
ECHO PV MEAN GRADIENT: 1 MMHG
ECHO PV MEAN VELOCITY: 0.5 M/S
ECHO PV PEAK GRADIENT: 2 MMHG
ECHO PV REGURGITANT MAX VELOCITY: 1 M/S
ECHO PV VTI: 19.6 CM
ECHO RA AREA 4C: 16.7 CM2
ECHO RA END SYSTOLIC VOLUME APICAL 4 CHAMBER INDEX BSA: 29 ML/M2
ECHO RA VOLUME: 49 ML
ECHO RIGHT VENTRICULAR SYSTOLIC PRESSURE (RVSP): 26 MMHG
ECHO RV BASAL DIMENSION: 3.6 CM
ECHO RV FREE WALL PEAK S': 12 CM/S
ECHO RV TAPSE: 1.7 CM (ref 1.7–?)
ECHO TV REGURGITANT MAX VELOCITY: 2.42 M/S
ECHO TV REGURGITANT PEAK GRADIENT: 23 MMHG
ECHO TV REGURGITANT VTI: 91.1 CM
ERYTHROCYTE [DISTWIDTH] IN BLOOD BY AUTOMATED COUNT: 13.6 % (ref 11.5–14.5)
EST. AVERAGE GLUCOSE BLD GHB EST-MCNC: 117 MG/DL
GLUCOSE SERPL-MCNC: 130 MG/DL (ref 65–100)
HBA1C MFR BLD: 5.7 % (ref 4–5.6)
HCT VFR BLD AUTO: 38.2 % (ref 35–47)
HDLC SERPL-MCNC: 77 MG/DL
HDLC SERPL: 2.7 (ref 0–5)
HGB BLD-MCNC: 13 G/DL (ref 11.5–16)
LDLC SERPL CALC-MCNC: 115 MG/DL (ref 0–100)
LIPID PANEL: ABNORMAL
MCH RBC QN AUTO: 31.6 PG (ref 26–34)
MCHC RBC AUTO-ENTMCNC: 34 G/DL (ref 30–36.5)
MCV RBC AUTO: 92.9 FL (ref 80–99)
NRBC # BLD: 0 K/UL (ref 0–0.01)
NRBC BLD-RTO: 0 PER 100 WBC
PLATELET # BLD AUTO: 191 K/UL (ref 150–400)
PMV BLD AUTO: 12.7 FL (ref 8.9–12.9)
POTASSIUM SERPL-SCNC: 3.6 MMOL/L (ref 3.5–5.1)
RBC # BLD AUTO: 4.11 M/UL (ref 3.8–5.2)
SODIUM SERPL-SCNC: 138 MMOL/L (ref 136–145)
TRIGL SERPL-MCNC: 90 MG/DL
TROPONIN I SERPL HS-MCNC: 10 NG/L (ref 0–51)
VLDLC SERPL CALC-MCNC: 18 MG/DL
WBC # BLD AUTO: 8.2 K/UL (ref 3.6–11)

## 2024-07-19 PROCEDURE — G0425 INPT/ED TELECONSULT30: HCPCS | Performed by: PSYCHIATRY & NEUROLOGY

## 2024-07-19 PROCEDURE — 97530 THERAPEUTIC ACTIVITIES: CPT

## 2024-07-19 PROCEDURE — 92610 EVALUATE SWALLOWING FUNCTION: CPT

## 2024-07-19 PROCEDURE — 97161 PT EVAL LOW COMPLEX 20 MIN: CPT

## 2024-07-19 PROCEDURE — 80048 BASIC METABOLIC PNL TOTAL CA: CPT

## 2024-07-19 PROCEDURE — 80061 LIPID PANEL: CPT

## 2024-07-19 PROCEDURE — 2580000003 HC RX 258: Performed by: INTERNAL MEDICINE

## 2024-07-19 PROCEDURE — 94761 N-INVAS EAR/PLS OXIMETRY MLT: CPT

## 2024-07-19 PROCEDURE — 36415 COLL VENOUS BLD VENIPUNCTURE: CPT

## 2024-07-19 PROCEDURE — 6360000002 HC RX W HCPCS: Performed by: INTERNAL MEDICINE

## 2024-07-19 PROCEDURE — 6370000000 HC RX 637 (ALT 250 FOR IP): Performed by: INTERNAL MEDICINE

## 2024-07-19 PROCEDURE — 83036 HEMOGLOBIN GLYCOSYLATED A1C: CPT

## 2024-07-19 PROCEDURE — 85027 COMPLETE CBC AUTOMATED: CPT

## 2024-07-19 PROCEDURE — 84484 ASSAY OF TROPONIN QUANT: CPT

## 2024-07-19 PROCEDURE — 1100000000 HC RM PRIVATE

## 2024-07-19 PROCEDURE — 97165 OT EVAL LOW COMPLEX 30 MIN: CPT

## 2024-07-19 PROCEDURE — 70551 MRI BRAIN STEM W/O DYE: CPT

## 2024-07-19 PROCEDURE — 93306 TTE W/DOPPLER COMPLETE: CPT

## 2024-07-19 RX ORDER — AMLODIPINE BESYLATE 5 MG/1
5 TABLET ORAL DAILY
Status: DISCONTINUED | OUTPATIENT
Start: 2024-07-19 | End: 2024-07-22 | Stop reason: HOSPADM

## 2024-07-19 RX ORDER — AMIODARONE HYDROCHLORIDE 200 MG/1
200 TABLET ORAL 2 TIMES DAILY
Status: DISCONTINUED | OUTPATIENT
Start: 2024-07-19 | End: 2024-07-22 | Stop reason: HOSPADM

## 2024-07-19 RX ORDER — TIMOLOL MALEATE 5 MG/ML
1 SOLUTION/ DROPS OPHTHALMIC DAILY
Status: DISCONTINUED | OUTPATIENT
Start: 2024-07-19 | End: 2024-07-22 | Stop reason: HOSPADM

## 2024-07-19 RX ORDER — BRINZOLAMIDE 10 MG/ML
1 SUSPENSION/ DROPS OPHTHALMIC DAILY
Status: DISCONTINUED | OUTPATIENT
Start: 2024-07-20 | End: 2024-07-22 | Stop reason: HOSPADM

## 2024-07-19 RX ORDER — DORZOLAMIDE HCL 20 MG/ML
2 SOLUTION/ DROPS OPHTHALMIC DAILY
Status: DISCONTINUED | OUTPATIENT
Start: 2024-07-19 | End: 2024-07-19

## 2024-07-19 RX ORDER — FUROSEMIDE 40 MG/1
20 TABLET ORAL DAILY
Status: DISCONTINUED | OUTPATIENT
Start: 2024-07-19 | End: 2024-07-22 | Stop reason: HOSPADM

## 2024-07-19 RX ORDER — GABAPENTIN 300 MG/1
300 CAPSULE ORAL NIGHTLY PRN
Status: DISCONTINUED | OUTPATIENT
Start: 2024-07-19 | End: 2024-07-22 | Stop reason: HOSPADM

## 2024-07-19 RX ORDER — LEVOTHYROXINE SODIUM 0.03 MG/1
50 TABLET ORAL DAILY
Status: DISCONTINUED | OUTPATIENT
Start: 2024-07-19 | End: 2024-07-22 | Stop reason: HOSPADM

## 2024-07-19 RX ADMIN — AMLODIPINE BESYLATE 5 MG: 5 TABLET ORAL at 15:38

## 2024-07-19 RX ADMIN — TIMOLOL MALEATE 1 DROP: 5 SOLUTION OPHTHALMIC at 17:10

## 2024-07-19 RX ADMIN — LEVOTHYROXINE SODIUM 50 MCG: 0.03 TABLET ORAL at 15:38

## 2024-07-19 RX ADMIN — HEPARIN SODIUM 5000 UNITS: 5000 INJECTION INTRAVENOUS; SUBCUTANEOUS at 06:31

## 2024-07-19 RX ADMIN — HEPARIN SODIUM 5000 UNITS: 5000 INJECTION INTRAVENOUS; SUBCUTANEOUS at 20:42

## 2024-07-19 RX ADMIN — SODIUM CHLORIDE, POTASSIUM CHLORIDE, SODIUM LACTATE AND CALCIUM CHLORIDE: 600; 310; 30; 20 INJECTION, SOLUTION INTRAVENOUS at 06:31

## 2024-07-19 RX ADMIN — ASPIRIN 81 MG 81 MG: 81 TABLET ORAL at 09:55

## 2024-07-19 RX ADMIN — SODIUM CHLORIDE, POTASSIUM CHLORIDE, SODIUM LACTATE AND CALCIUM CHLORIDE: 600; 310; 30; 20 INJECTION, SOLUTION INTRAVENOUS at 23:32

## 2024-07-19 RX ADMIN — HEPARIN SODIUM 5000 UNITS: 5000 INJECTION INTRAVENOUS; SUBCUTANEOUS at 14:45

## 2024-07-19 RX ADMIN — AMIODARONE HYDROCHLORIDE 200 MG: 200 TABLET ORAL at 20:42

## 2024-07-19 RX ADMIN — SODIUM CHLORIDE, PRESERVATIVE FREE 10 ML: 5 INJECTION INTRAVENOUS at 20:37

## 2024-07-19 RX ADMIN — FUROSEMIDE 20 MG: 40 TABLET ORAL at 15:38

## 2024-07-19 RX ADMIN — ATORVASTATIN CALCIUM 40 MG: 40 TABLET, FILM COATED ORAL at 20:42

## 2024-07-19 NOTE — CONSULTS
cm. 4. Cervical spondylosis with associated high-grade C5-C6 spinal canal narrowing and high-grade neuroforaminal narrowing at C5-C7 as detailed. Electronically signed by MICHOACANO BILL    CT HEAD WO CONTRAST    Result Date: 7/18/2024  EXAM: CT CODE NEURO HEAD WO CONTRAST INDICATION: Stroke. Unresponsive. COMPARISON: 8/19/2023. CONTRAST: None. TECHNIQUE: Unenhanced CT of the head was performed using 5 mm images. Brain and bone windows were generated. Coronal and sagittal reformats. CT dose reduction was achieved through use of a standardized protocol tailored for this examination and automatic exposure control for dose modulation.  FINDINGS: The ventricles and sulci are normal in size, shape and configuration. There is no significant white matter disease. There is no intracranial hemorrhage, extra-axial collection, or mass effect. The basilar cisterns are open. No CT evidence of acute infarct. The bone windows demonstrate no abnormalities. The visualized portions of the paranasal sinuses and mastoid air cells are clear. 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     No evidence of acute process. Electronically signed by QUYNH Clark Attestation:             ?I discussed the risks and benefits of a telehealth visit and I obtained the patient's or legally authorized representative's informed verbal consent to conduct this assessment using telehealth tools.? All of the patient’s or legally authorized representative's questions regarding the telehealth interaction were addressed. I provided my name and disclosed to the patient or legally authorized representative my licensure, certification, or registration. ?This consultation was remotely performed at the request of Paris Banks MD with the assistance of a trained virtual health liaison working at the originating site.? The consultation used real time licensed and encrypted telehealth equipment which allowed a live video connection between my location and

## 2024-07-19 NOTE — CARE COORDINATION
07/19/24 1413   Service Assessment   Patient Orientation Alert and Oriented   Cognition Alert   History Provided By Patient   Primary Caregiver Self   Support Systems Children   Patient's Healthcare Decision Maker is: Legal Next of Kin   PCP Verified by CM Yes   Last Visit to PCP Within last 3 months   Prior Functional Level Independent in ADLs/IADLs;Mobility;Other (see comment)  ([employer])   Current Functional Level Independent in ADLs/IADLs;Mobility;Other (see comment)  ([employer])   Can patient return to prior living arrangement Yes   Ability to make needs known: Good   Family able to assist with home care needs: Yes   Would you like for me to discuss the discharge plan with any other family members/significant others, and if so, who? Yes     Advance Care Planning     General Advance Care Planning (ACP) Conversation    Date of Conversation: 7/19/2024  Conducted with: Patient with Decision Making Capacity  Other persons present: None    Healthcare Decision Maker:   Primary Decision Maker: Rajwinder Aponte - Child - [phone]    Secondary Decision Maker: VEEJUDAH - Grandchild - [phone]  Click here to complete Healthcare Decision Makers including selection of the Healthcare Decision Maker Relationship (ie \"Primary\").       Length of Voluntary ACP Conversation in minutes:  <16 minutes (Non-Billable)    Amilcar Lee RN CM met with patient at bedside to discuss discharge planning assessment.  Patient lives at home with her daughter.  DME Consists of [employer] and Elevated toilet seat.  Patient plans to return home at time of discharge and states her daughter will transport her.  CM provided recommendations for home health.  Patient states she has home health at home, but does not remember the name of the company.  CM called patients [phone].  CM notified that patient is current with [employer].  Patient is agreeable to having referral sent to  agency.

## 2024-07-20 ENCOUNTER — APPOINTMENT (OUTPATIENT)
Facility: HOSPITAL | Age: 89
DRG: 061 | End: 2024-07-20
Attending: INTERNAL MEDICINE
Payer: MEDICARE

## 2024-07-20 LAB
ANION GAP SERPL CALC-SCNC: 7 MMOL/L (ref 5–15)
BUN SERPL-MCNC: 19 MG/DL (ref 6–20)
BUN/CREAT SERPL: 12 (ref 12–20)
CA-I BLD-MCNC: 9.5 MG/DL (ref 8.5–10.1)
CHLORIDE SERPL-SCNC: 108 MMOL/L (ref 97–108)
CO2 SERPL-SCNC: 22 MMOL/L (ref 21–32)
CREAT SERPL-MCNC: 1.6 MG/DL (ref 0.55–1.02)
ERYTHROCYTE [DISTWIDTH] IN BLOOD BY AUTOMATED COUNT: 13.7 % (ref 11.5–14.5)
GLUCOSE SERPL-MCNC: 109 MG/DL (ref 65–100)
HCT VFR BLD AUTO: 37.9 % (ref 35–47)
HGB BLD-MCNC: 12.8 G/DL (ref 11.5–16)
MCH RBC QN AUTO: 31.7 PG (ref 26–34)
MCHC RBC AUTO-ENTMCNC: 33.8 G/DL (ref 30–36.5)
MCV RBC AUTO: 93.8 FL (ref 80–99)
NRBC # BLD: 0 K/UL (ref 0–0.01)
NRBC BLD-RTO: 0 PER 100 WBC
PLATELET # BLD AUTO: 175 K/UL (ref 150–400)
PMV BLD AUTO: 11.9 FL (ref 8.9–12.9)
POTASSIUM SERPL-SCNC: 3.5 MMOL/L (ref 3.5–5.1)
RBC # BLD AUTO: 4.04 M/UL (ref 3.8–5.2)
SODIUM SERPL-SCNC: 137 MMOL/L (ref 136–145)
VAS LEFT CCA DIST EDV: 11.1 CM/S
VAS LEFT CCA DIST PSV: 85.7 CM/S
VAS LEFT CCA PROX EDV: 8.6 CM/S
VAS LEFT CCA PROX PSV: 81.5 CM/S
VAS LEFT ECA EDV: 0 CM/S
VAS LEFT ECA PSV: 110 CM/S
VAS LEFT ICA DIST EDV: 22.7 CM/S
VAS LEFT ICA DIST PSV: 115 CM/S
VAS LEFT ICA PROX EDV: 22.3 CM/S
VAS LEFT ICA PROX PSV: 93.5 CM/S
VAS LEFT ICA/CCA PSV: 1.09
VAS LEFT SUBCLAVIAN PROX EDV: 0.9 CM/S
VAS LEFT SUBCLAVIAN PROX PSV: 116 CM/S
VAS LEFT VERTEBRAL EDV: 5.38 CM/S
VAS LEFT VERTEBRAL PSV: 55.9 CM/S
VAS RIGHT CCA DIST EDV: 9.4 CM/S
VAS RIGHT CCA DIST PSV: 78 CM/S
VAS RIGHT CCA PROX EDV: 9.4 CM/S
VAS RIGHT CCA PROX PSV: 84.9 CM/S
VAS RIGHT ECA EDV: 0 CM/S
VAS RIGHT ECA PSV: 127 CM/S
VAS RIGHT ICA DIST EDV: 15.3 CM/S
VAS RIGHT ICA DIST PSV: 84.3 CM/S
VAS RIGHT ICA PROX EDV: 10.7 CM/S
VAS RIGHT ICA PROX PSV: 70.5 CM/S
VAS RIGHT ICA/CCA PSV: 0.9
VAS RIGHT SUBCLAVIAN PROX EDV: 0 CM/S
VAS RIGHT SUBCLAVIAN PROX PSV: 98.6 CM/S
VAS RIGHT VERTEBRAL EDV: 0 CM/S
VAS RIGHT VERTEBRAL PSV: 33.9 CM/S
WBC # BLD AUTO: 7.1 K/UL (ref 3.6–11)

## 2024-07-20 PROCEDURE — 93880 EXTRACRANIAL BILAT STUDY: CPT

## 2024-07-20 PROCEDURE — 85027 COMPLETE CBC AUTOMATED: CPT

## 2024-07-20 PROCEDURE — 97530 THERAPEUTIC ACTIVITIES: CPT

## 2024-07-20 PROCEDURE — 6370000000 HC RX 637 (ALT 250 FOR IP): Performed by: INTERNAL MEDICINE

## 2024-07-20 PROCEDURE — 2580000003 HC RX 258: Performed by: INTERNAL MEDICINE

## 2024-07-20 PROCEDURE — 80048 BASIC METABOLIC PNL TOTAL CA: CPT

## 2024-07-20 PROCEDURE — 6370000000 HC RX 637 (ALT 250 FOR IP): Performed by: FAMILY MEDICINE

## 2024-07-20 PROCEDURE — 94761 N-INVAS EAR/PLS OXIMETRY MLT: CPT

## 2024-07-20 PROCEDURE — G0408 INPT/TELE FOLLOW UP 35: HCPCS | Performed by: PSYCHIATRY & NEUROLOGY

## 2024-07-20 PROCEDURE — 36415 COLL VENOUS BLD VENIPUNCTURE: CPT

## 2024-07-20 PROCEDURE — 6360000002 HC RX W HCPCS: Performed by: INTERNAL MEDICINE

## 2024-07-20 PROCEDURE — 1100000000 HC RM PRIVATE

## 2024-07-20 RX ADMIN — LEVOTHYROXINE SODIUM 50 MCG: 0.03 TABLET ORAL at 06:18

## 2024-07-20 RX ADMIN — SODIUM CHLORIDE, PRESERVATIVE FREE 10 ML: 5 INJECTION INTRAVENOUS at 21:31

## 2024-07-20 RX ADMIN — AMIODARONE HYDROCHLORIDE 200 MG: 200 TABLET ORAL at 08:44

## 2024-07-20 RX ADMIN — ACETAMINOPHEN 650 MG: 325 TABLET ORAL at 18:32

## 2024-07-20 RX ADMIN — FUROSEMIDE 20 MG: 40 TABLET ORAL at 08:44

## 2024-07-20 RX ADMIN — HEPARIN SODIUM 5000 UNITS: 5000 INJECTION INTRAVENOUS; SUBCUTANEOUS at 06:18

## 2024-07-20 RX ADMIN — ACETAMINOPHEN 650 MG: 325 TABLET ORAL at 00:02

## 2024-07-20 RX ADMIN — SODIUM CHLORIDE, POTASSIUM CHLORIDE, SODIUM LACTATE AND CALCIUM CHLORIDE: 600; 310; 30; 20 INJECTION, SOLUTION INTRAVENOUS at 14:30

## 2024-07-20 RX ADMIN — TIMOLOL MALEATE 1 DROP: 5 SOLUTION OPHTHALMIC at 08:45

## 2024-07-20 RX ADMIN — HEPARIN SODIUM 5000 UNITS: 5000 INJECTION INTRAVENOUS; SUBCUTANEOUS at 21:30

## 2024-07-20 RX ADMIN — ASPIRIN 81 MG 81 MG: 81 TABLET ORAL at 08:44

## 2024-07-20 RX ADMIN — AMLODIPINE BESYLATE 5 MG: 5 TABLET ORAL at 08:44

## 2024-07-20 RX ADMIN — ATORVASTATIN CALCIUM 40 MG: 40 TABLET, FILM COATED ORAL at 21:30

## 2024-07-20 RX ADMIN — BRINZOLAMIDE 1 DROP: 10 SUSPENSION/ DROPS OPHTHALMIC at 08:45

## 2024-07-20 RX ADMIN — AMIODARONE HYDROCHLORIDE 200 MG: 200 TABLET ORAL at 21:30

## 2024-07-20 RX ADMIN — HEPARIN SODIUM 5000 UNITS: 5000 INJECTION INTRAVENOUS; SUBCUTANEOUS at 14:30

## 2024-07-21 LAB
ANION GAP SERPL CALC-SCNC: 6 MMOL/L (ref 5–15)
BUN SERPL-MCNC: 23 MG/DL (ref 6–20)
BUN/CREAT SERPL: 14 (ref 12–20)
CA-I BLD-MCNC: 8.6 MG/DL (ref 8.5–10.1)
CHLORIDE SERPL-SCNC: 105 MMOL/L (ref 97–108)
CO2 SERPL-SCNC: 27 MMOL/L (ref 21–32)
CREAT SERPL-MCNC: 1.65 MG/DL (ref 0.55–1.02)
ERYTHROCYTE [DISTWIDTH] IN BLOOD BY AUTOMATED COUNT: 14 % (ref 11.5–14.5)
GLUCOSE SERPL-MCNC: 86 MG/DL (ref 65–100)
HCT VFR BLD AUTO: 34.7 % (ref 35–47)
HGB BLD-MCNC: 11.7 G/DL (ref 11.5–16)
MCH RBC QN AUTO: 31.2 PG (ref 26–34)
MCHC RBC AUTO-ENTMCNC: 33.7 G/DL (ref 30–36.5)
MCV RBC AUTO: 92.5 FL (ref 80–99)
NRBC # BLD: 0 K/UL (ref 0–0.01)
NRBC BLD-RTO: 0 PER 100 WBC
PLATELET # BLD AUTO: 173 K/UL (ref 150–400)
PMV BLD AUTO: 12 FL (ref 8.9–12.9)
POTASSIUM SERPL-SCNC: 3.2 MMOL/L (ref 3.5–5.1)
RBC # BLD AUTO: 3.75 M/UL (ref 3.8–5.2)
SODIUM SERPL-SCNC: 138 MMOL/L (ref 136–145)
WBC # BLD AUTO: 6.4 K/UL (ref 3.6–11)

## 2024-07-21 PROCEDURE — 94761 N-INVAS EAR/PLS OXIMETRY MLT: CPT

## 2024-07-21 PROCEDURE — 97530 THERAPEUTIC ACTIVITIES: CPT

## 2024-07-21 PROCEDURE — 2580000003 HC RX 258: Performed by: NURSE PRACTITIONER

## 2024-07-21 PROCEDURE — 1100000000 HC RM PRIVATE

## 2024-07-21 PROCEDURE — 80048 BASIC METABOLIC PNL TOTAL CA: CPT

## 2024-07-21 PROCEDURE — 6360000002 HC RX W HCPCS: Performed by: INTERNAL MEDICINE

## 2024-07-21 PROCEDURE — 2580000003 HC RX 258: Performed by: INTERNAL MEDICINE

## 2024-07-21 PROCEDURE — 6370000000 HC RX 637 (ALT 250 FOR IP): Performed by: INTERNAL MEDICINE

## 2024-07-21 PROCEDURE — 36415 COLL VENOUS BLD VENIPUNCTURE: CPT

## 2024-07-21 PROCEDURE — 85027 COMPLETE CBC AUTOMATED: CPT

## 2024-07-21 RX ORDER — SODIUM CHLORIDE 9 MG/ML
INJECTION, SOLUTION INTRAVENOUS CONTINUOUS
Status: DISCONTINUED | OUTPATIENT
Start: 2024-07-21 | End: 2024-07-22 | Stop reason: HOSPADM

## 2024-07-21 RX ADMIN — AMLODIPINE BESYLATE 5 MG: 5 TABLET ORAL at 09:46

## 2024-07-21 RX ADMIN — AMIODARONE HYDROCHLORIDE 200 MG: 200 TABLET ORAL at 21:15

## 2024-07-21 RX ADMIN — HEPARIN SODIUM 5000 UNITS: 5000 INJECTION INTRAVENOUS; SUBCUTANEOUS at 14:45

## 2024-07-21 RX ADMIN — TIMOLOL MALEATE 1 DROP: 5 SOLUTION OPHTHALMIC at 09:48

## 2024-07-21 RX ADMIN — BRINZOLAMIDE 1 DROP: 10 SUSPENSION/ DROPS OPHTHALMIC at 09:48

## 2024-07-21 RX ADMIN — ASPIRIN 81 MG 81 MG: 81 TABLET ORAL at 09:46

## 2024-07-21 RX ADMIN — ACETAMINOPHEN 650 MG: 325 TABLET ORAL at 09:46

## 2024-07-21 RX ADMIN — ATORVASTATIN CALCIUM 40 MG: 40 TABLET, FILM COATED ORAL at 21:15

## 2024-07-21 RX ADMIN — SODIUM CHLORIDE: 9 INJECTION, SOLUTION INTRAVENOUS at 23:52

## 2024-07-21 RX ADMIN — HEPARIN SODIUM 5000 UNITS: 5000 INJECTION INTRAVENOUS; SUBCUTANEOUS at 06:18

## 2024-07-21 RX ADMIN — SODIUM CHLORIDE: 9 INJECTION, SOLUTION INTRAVENOUS at 14:45

## 2024-07-21 RX ADMIN — LEVOTHYROXINE SODIUM 50 MCG: 0.03 TABLET ORAL at 06:18

## 2024-07-21 RX ADMIN — AMIODARONE HYDROCHLORIDE 200 MG: 200 TABLET ORAL at 09:46

## 2024-07-21 RX ADMIN — HEPARIN SODIUM 5000 UNITS: 5000 INJECTION INTRAVENOUS; SUBCUTANEOUS at 21:15

## 2024-07-21 RX ADMIN — GABAPENTIN 300 MG: 300 CAPSULE ORAL at 21:15

## 2024-07-21 RX ADMIN — SODIUM CHLORIDE, POTASSIUM CHLORIDE, SODIUM LACTATE AND CALCIUM CHLORIDE: 600; 310; 30; 20 INJECTION, SOLUTION INTRAVENOUS at 01:20

## 2024-07-21 RX ADMIN — ACETAMINOPHEN 650 MG: 325 TABLET ORAL at 17:20

## 2024-07-21 RX ADMIN — FUROSEMIDE 20 MG: 40 TABLET ORAL at 09:47

## 2024-07-21 RX ADMIN — SODIUM CHLORIDE, POTASSIUM CHLORIDE, SODIUM LACTATE AND CALCIUM CHLORIDE: 600; 310; 30; 20 INJECTION, SOLUTION INTRAVENOUS at 13:17

## 2024-07-22 VITALS
WEIGHT: 126.54 LBS | SYSTOLIC BLOOD PRESSURE: 122 MMHG | HEART RATE: 51 BPM | OXYGEN SATURATION: 97 % | RESPIRATION RATE: 18 BRPM | TEMPERATURE: 97.7 F | HEIGHT: 66 IN | DIASTOLIC BLOOD PRESSURE: 51 MMHG | BODY MASS INDEX: 20.34 KG/M2

## 2024-07-22 LAB
ANION GAP SERPL CALC-SCNC: 4 MMOL/L (ref 5–15)
BUN SERPL-MCNC: 21 MG/DL (ref 6–20)
BUN/CREAT SERPL: 14 (ref 12–20)
CA-I BLD-MCNC: 8.3 MG/DL (ref 8.5–10.1)
CHLORIDE SERPL-SCNC: 111 MMOL/L (ref 97–108)
CO2 SERPL-SCNC: 25 MMOL/L (ref 21–32)
CREAT SERPL-MCNC: 1.54 MG/DL (ref 0.55–1.02)
ERYTHROCYTE [DISTWIDTH] IN BLOOD BY AUTOMATED COUNT: 13.7 % (ref 11.5–14.5)
GLUCOSE SERPL-MCNC: 81 MG/DL (ref 65–100)
HCT VFR BLD AUTO: 35.2 % (ref 35–47)
HGB BLD-MCNC: 11.8 G/DL (ref 11.5–16)
MCH RBC QN AUTO: 31.4 PG (ref 26–34)
MCHC RBC AUTO-ENTMCNC: 33.5 G/DL (ref 30–36.5)
MCV RBC AUTO: 93.6 FL (ref 80–99)
NRBC # BLD: 0 K/UL (ref 0–0.01)
NRBC BLD-RTO: 0 PER 100 WBC
PLATELET # BLD AUTO: 171 K/UL (ref 150–400)
PMV BLD AUTO: 12.4 FL (ref 8.9–12.9)
POTASSIUM SERPL-SCNC: 3.2 MMOL/L (ref 3.5–5.1)
RBC # BLD AUTO: 3.76 M/UL (ref 3.8–5.2)
SODIUM SERPL-SCNC: 140 MMOL/L (ref 136–145)
WBC # BLD AUTO: 6.7 K/UL (ref 3.6–11)

## 2024-07-22 PROCEDURE — 6360000002 HC RX W HCPCS: Performed by: INTERNAL MEDICINE

## 2024-07-22 PROCEDURE — 85027 COMPLETE CBC AUTOMATED: CPT

## 2024-07-22 PROCEDURE — 36415 COLL VENOUS BLD VENIPUNCTURE: CPT

## 2024-07-22 PROCEDURE — 6370000000 HC RX 637 (ALT 250 FOR IP): Performed by: INTERNAL MEDICINE

## 2024-07-22 PROCEDURE — 97530 THERAPEUTIC ACTIVITIES: CPT

## 2024-07-22 PROCEDURE — 80048 BASIC METABOLIC PNL TOTAL CA: CPT

## 2024-07-22 RX ORDER — CLOPIDOGREL BISULFATE 75 MG/1
75 TABLET ORAL DAILY
Qty: 30 TABLET | Refills: 1 | Status: SHIPPED | OUTPATIENT
Start: 2024-07-22

## 2024-07-22 RX ORDER — ASPIRIN/CALCIUM/MAG/ALUMINUM 325 MG
1 TABLET ORAL DAILY
Qty: 30 TABLET | Refills: 0 | Status: SHIPPED | OUTPATIENT
Start: 2024-07-22

## 2024-07-22 RX ADMIN — FUROSEMIDE 20 MG: 40 TABLET ORAL at 10:44

## 2024-07-22 RX ADMIN — LEVOTHYROXINE SODIUM 50 MCG: 0.03 TABLET ORAL at 06:40

## 2024-07-22 RX ADMIN — AMLODIPINE BESYLATE 5 MG: 5 TABLET ORAL at 10:45

## 2024-07-22 RX ADMIN — HEPARIN SODIUM 5000 UNITS: 5000 INJECTION INTRAVENOUS; SUBCUTANEOUS at 06:40

## 2024-07-22 RX ADMIN — AMIODARONE HYDROCHLORIDE 200 MG: 200 TABLET ORAL at 10:45

## 2024-07-22 RX ADMIN — BRINZOLAMIDE 1 DROP: 10 SUSPENSION/ DROPS OPHTHALMIC at 10:52

## 2024-07-22 RX ADMIN — ASPIRIN 81 MG 81 MG: 81 TABLET ORAL at 10:45

## 2024-07-22 RX ADMIN — TIMOLOL MALEATE 1 DROP: 5 SOLUTION OPHTHALMIC at 10:52

## 2024-07-22 NOTE — PLAN OF CARE
PHYSICAL THERAPY TREATMENT     Patient: Julee Mcclellan (89 y.o. female)  Date: 7/22/2024  Diagnosis: Transient alteration of awareness [R40.4]  TIA (transient ischemic attack) [G45.9]  Cerebrovascular accident (CVA), unspecified mechanism (HCC) [I63.9]  AMS (altered mental status) [R41.82] AMS (altered mental status)      Precautions: Fall Risk, Bed Alarm                      Recommendations for nursing mobility: Out of bed to chair for meals, AD and gt belt for bed to chair , Amb to bathroom with AD and gait belt, and Assist x1    In place during session: Peripheral IV and External Catheter  Chart, physical therapy assessment, plan of care and goals were reviewed.  ASSESSMENT  Patient continues with skilled PT services and is progressing towards goals. Pt supine in bed upon PT arrival, agreeable to session. (See below for objective details and assist levels).     Overall pt tolerated session fair today. Pt transferred to eob sba. Pt performed seated LE therex with no complaints of pain or discomfort. Pt stood from bed and into bathroom and transferred on and off the toilet cga. Pt ambulated then to recliner chair. Pt ambulated ~30 ft with RW and cga. Gait was slow and fairly steady with no lob or knee buckling noted. Pt left sitting up in recliner chair with all needs met. Will continue to benefit from skilled PT services, and will continue to progress as tolerated. Potential barriers for safe discharge:. Current PT DC recommendation Home with Home Health Therapy once medically appropriate.    GOALS:  Problem: Physical Therapy - Adult  Goal: By Discharge: Performs mobility at highest level of function for planned discharge setting.  See evaluation for individualized goals.  Description: FUNCTIONAL STATUS PRIOR TO ADMISSION: Prior to recent admission, pt was independent with ADLs, was receiving assistance from an aide M-F 9am-1pm, and was ambulating without AD. Pt reported having a RW and cane at 
  Problem: ABCDS Injury Assessment  Goal: Absence of physical injury  Outcome: Progressing     Problem: Skin/Tissue Integrity  Goal: Absence of new skin breakdown  Description: 1.  Monitor for areas of redness and/or skin breakdown  2.  Assess vascular access sites hourly  3.  Every 4-6 hours minimum:  Change oxygen saturation probe site  4.  Every 4-6 hours:  If on nasal continuous positive airway pressure, respiratory therapy assess nares and determine need for appliance change or resting period.  Outcome: Progressing     Problem: Discharge Planning  Goal: Discharge to home or other facility with appropriate resources  Outcome: Progressing  Flowsheets (Taken 7/21/2024 7412 by Jenny Griffin, RN)  Discharge to home or other facility with appropriate resources: Identify barriers to discharge with patient and caregiver     
  Problem: Discharge Planning  Goal: Discharge to home or other facility with appropriate resources  Outcome: Progressing     Problem: Skin/Tissue Integrity  Goal: Absence of new skin breakdown  Description: 1.  Monitor for areas of redness and/or skin breakdown  2.  Assess vascular access sites hourly  3.  Every 4-6 hours minimum:  Change oxygen saturation probe site  4.  Every 4-6 hours:  If on nasal continuous positive airway pressure, respiratory therapy assess nares and determine need for appliance change or resting period.  7/21/2024 0152 by Gaurav Selby RN  Outcome: Progressing  7/21/2024 0152 by Gaurav Selby RN  Outcome: Progressing     Problem: Safety - Adult  Goal: Free from fall injury  7/21/2024 0152 by Gaurav Selby RN  Outcome: Progressing  7/21/2024 0152 by Gaurav Selby RN  Outcome: Progressing     Problem: ABCDS Injury Assessment  Goal: Absence of physical injury  7/21/2024 0152 by Gaurav Selby RN  Outcome: Progressing  7/21/2024 0152 by Gaurav Selby RN  Outcome: Progressing     Problem: Occupational Therapy - Adult  Goal: By Discharge: Performs self-care activities at highest level of function for planned discharge setting.  See evaluation for individualized goals.  Description: FUNCTIONAL STATUS PRIOR TO ADMISSION:  Prior to recent admission, pt was independent with ADLs, was receiving assistance from an aide M-F 9am-1pm, and was ambulating without AD. Pt reported having a RW and cane at home.    HOME SUPPORT: The patient lived with her daughter and required some assistance for IADLs from the aide.    Occupational Therapy Goals:  Initiated 7/19/2024  Patient/Family stated goal: return home  1.  Patient will perform self-feeding with [place] within 7 day(s).  2.  Patient will perform grooming with [place] within 7 day(s).  3.  Patient will perform upper body dressing with [place] within 7 day(s).  4.  Patient will perform toilet transfers with [place]  
  Problem: Discharge Planning  Goal: Discharge to home or other facility with appropriate resources  Outcome: Progressing  Flowsheets  Taken 7/18/2024 2018  Discharge to home or other facility with appropriate resources: Identify barriers to discharge with patient and caregiver  Taken 7/18/2024 1955  Discharge to home or other facility with appropriate resources: Identify barriers to discharge with patient and caregiver     Problem: Skin/Tissue Integrity  Goal: Absence of new skin breakdown  Description: 1.  Monitor for areas of redness and/or skin breakdown  2.  Assess vascular access sites hourly  3.  Every 4-6 hours minimum:  Change oxygen saturation probe site  4.  Every 4-6 hours:  If on nasal continuous positive airway pressure, respiratory therapy assess nares and determine need for appliance change or resting period.  Outcome: Progressing     Problem: Safety - Adult  Goal: Free from fall injury  Outcome: Progressing  Flowsheets (Taken 7/19/2024 0219)  Free From Fall Injury: Instruct family/caregiver on patient safety     Problem: ABCDS Injury Assessment  Goal: Absence of physical injury  Outcome: Progressing  Flowsheets (Taken 7/19/2024 0219)  Absence of Physical Injury: Implement safety measures based on patient assessment     
  Problem: Occupational Therapy - Adult  Goal: By Discharge: Performs self-care activities at highest level of function for planned discharge setting.  See evaluation for individualized goals.  Description: FUNCTIONAL STATUS PRIOR TO ADMISSION:  Prior to recent admission, pt was independent with ADLs, was receiving assistance from an aide M-F 9am-1pm, and was ambulating without AD. Pt reported having a RW and cane at home.    HOME SUPPORT: The patient lived with her daughter and required some assistance for IADLs from the aide.     OCCUPATIONAL THERAPY TREATMENT  Patient: Julee Mcclellan (89 y.o. female)  Date: 7/21/2024  Primary Diagnosis: Transient alteration of awareness [R40.4]  TIA (transient ischemic attack) [G45.9]  Cerebrovascular accident (CVA), unspecified mechanism (HCC) [I63.9]  AMS (altered mental status) [R41.82]       Precautions: Fall Risk, Bed Alarm                Recommendations for nursing mobility: Encourage HEP in prep for ADLs/mobility; see handout for details, AD and gt belt for bed to chair , and Amb to bathroom with AD and gait belt    In place during session: External Catheter and EKG/telemetry   Chart, occupational therapy assessment, plan of care, and goals were reviewed.  ASSESSMENT  Patient continues with skilled OT services and is progressing towards goals. Pt received supine in bed upon OT arrival, agreeable to session. Pt A&O x 4 with additional time. Pt requiring SBA for all bed mobility. Lunch arrived to room so pt ate some while seated EOB unsupported, approximately 12 mins. Pt then completed STS and noted pt to be wet, CGA to perform hygiene and change out sheets while standing. Pt ambulated few feet in room and then stating she was tired this date and wanted to rest. Declined sitting up in chair this date despite encouragement. Assisted back to bed and left pt semi supine in bed with call bell within reach and all needs met. (See below for objective details and assist 
SILVEIRA tx attempt at 9:30 however upon taking vitals, BP noted to be 183/64. Took it again reading 172/59. Notified nursing who asked to wait to try pt again later after given medicine for BP. Will continue to follow pt later time as able.  
Speech LAnguage Pathology Dysphagia EVALUATION    Patient: Julee Mcclellan (89 y.o. female)  Date: 7/19/2024  Primary Diagnosis: Transient alteration of awareness [R40.4]  TIA (transient ischemic attack) [G45.9]  Cerebrovascular accident (CVA), unspecified mechanism (HCC) [I63.9]  AMS (altered mental status) [R41.82]       Precautions: aspiration     Time In: 1121  Time Out: 1131    DIET RECOMMENDATIONS: Regular and thin liquids    SWALLOW SAFETY PRECAUTIONS:  aspiration and GERD precautions, meds as tolerated,      ASSESSMENT :  Based on the objective data described below, the patient presents with oropharyngeal sw function WNL, hypophonia that is a change from baseline and some change in mentation noted.     Pt seen for bedside sw evaluation, see details below.   Pt admitted for CVA workup c/b AMS, encephalopathy, found to have TEJAS. Hx includes hypothyroidism, br ca.     CTA head/neck results noted: \"3. 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 4. Cervical spondylosis with associated high-grade C5-C6 spinal canal narrowing and high-grade neuroforaminal narrowing at C5-C7 as detailed. \"    MRI brain results negative for acute findings.     Pt tolerates all trials of PO without s/s aspiration or difficulty.  Pt has been tolerating reg/thin diet.   Pt's family agrees pt's speech is softer than usual. Articulation and language are in tact for conversation.     Patient will benefit from skilled intervention to address the above impairments.    GOALS:    Problem: SLP Adult - Impaired Swallowing  Goal: By Discharge: Advance to least restrictive diet without signs or symptoms of aspiration for planned discharge setting.  See evaluation for individualized goals.  Description: Speech Therapy Swallow Goals    Initiated 7/19/2024    -Patient stated goal: pt does not state goals    -Patient will tolerate Regular and thin liquids diet without clinical 
supine in bed. (See below for objective details and assist levels).     Overall pt tolerated session well today with pt completing mobility more independently. Potential barriers for safe discharge: pt is not safe to be alone and concern for pt safely navigating or managing the home environment. Current OT recommendations for discharge Home with Home Health Therapy and family assist. Will continue to benefit from skilled OT services, and will continue to progress as tolerated.      Start of Session End of Session   SPO2 (%) 100 98   Heart Rate (BPM) 52 55     GOALS:  Occupational Therapy Goals:  Initiated 7/19/2024  Patient/Family stated goal: return home  1.  Patient will perform self-feeding with [place] within 7 day(s).  2.  Patient will perform grooming with [place] within 7 day(s).  3.  Patient will perform upper body dressing with [place] within 7 day(s).  4.  Patient will perform toilet transfers with [place]  within 7 day(s).  5.  Patient will perform all aspects of toileting with [place] within 7 day(s).  6.  Patient will participate in upper extremity therapeutic exercise/activities with [place] within 7 day(s).    Outcome: Progressing     PLAN :  Patient continues to benefit from skilled intervention to address functional impairments. Continue treatment per established plan of care to address goals.    Recommend next OT session: Toileting and UB bathing    Recommendation for discharge: (in order for the patient to meet his/her long term goals): Home with Home Health Therapy and family assist    IF patient discharges home will need the following DME: continuing to assess with progress     SUBJECTIVE:   Patient stated “Oh hunny I need to go to the bathroom if you can help.”      OBJECTIVE DATA SUMMARY:   Cognitive/Behavioral Status:  Orientation  Overall Orientation Status: Within Functional Limits  Orientation Level: Oriented X4  Cognition  Overall Cognitive Status: 
           Daily Activity Inpatient Short Form  How much help from another person does the patient currently need... Total; A Lot A Little None   1.  Putting on and taking off regular lower body clothing? []  1 []  2 [x]  3 []  4   2.  Bathing (including washing, rinsing, drying)? []  1 [x]  2 []  3 []  4   3.  Toileting, which includes using toilet, bedpan or urinal? [] 1 []  2 [x]  3 []  4   4.  Putting on and taking off regular upper body clothing? []  1 []  2 [x]  3 []  4   5.  Taking care of personal grooming such as brushing teeth? []  1 []  2 [x]  3 []  4   6.  Eating meals? []  1 []  2 []  3 [x]  4   © , Trustees of [facility], under license to [employer]. All rights reserved     Score:      Interpretation of Tool:  Represents clinically-significant functional categories (i.e. Activities of daily living).  Percentage of Impairment CH    0%   CI    1-19% CJ    20-39% CK    40-59% CL    60-79% CM    80-99% CN     100%   [facility]  Score 6-24 24 23 20-22 15-19 10-14 7-9 6     Occupational Therapy Evaluation Charge Determination   History Examination Decision-Making   LOW Complexity : Brief history review  MEDIUM Complexity: 3-5 Performance deficits relating to physical, cognitive, or psychosocial skills that result in activity limitations and/or participation restrictions MEDIUM Complexity: Patient may present with comorbidities that affect occupational performance. Minimal to moderate modifications of tasks or assist (eg. physical or verbal) with assist is necessary to enable pt to complete eval      Based on the above components, the patient evaluation is determined to be of the following complexity level: Low    Pain Ratin/10   Pain Intervention(s):       Activity Tolerance:   Fair     After treatment patient left in no apparent distress:    Patient left in no apparent distress in bed, Call bell within reach, Bed/ chair alarm activated, and PT present In room , bed locked and in lowest

## 2024-07-22 NOTE — DISCHARGE SUMMARY
Discharge Summary    Name: Julee Mcclellan  [id number]  YOB: 1931 (Age: 89 y.o.)   Date of Admission: 7/18/2024  Date of Discharge: 7/22/2024  Attending Physician: Luis Ansari MD    Discharge Diagnosis:   Principal Problem:    AMS (altered mental status)  Active Problems:    TIA (transient ischemic attack)  Resolved Problems:    * No resolved hospital problems. *       Consultations:  IP CONSULT TO TELE-NEUROLOGY  IP CONSULT TO NEUROLOGY  IP CONSULT TO CASE MANAGEMENT  IP CONSULT TO SPIRITUAL SERVICES  IP CONSULT TO CASE MANAGEMENT      Brief Admission History/Reason for Admission Per Paris Banks MD:     Julee Mcclellan is a 89 yo female with hypertension, hypothyroidism, atrial fibrillation admitted 7/18/2024 for TIA.CT head shows no evidence of acute process.  CTA head and neck shows no large vessel occlusion, high-grade stenosis or aneurysm.  Probable meningioma in the frontoparietal region seen on previous imaging.  MRI negative. Carotid duplex negative for significant stenosis. PT/OT recommended [facility]. Patient is medically stable for discharge.        Brief Hospital Course by Main Problems:     TIA vs metabolic encephalopathy: Please continue DAPT 30 days ( mg qd plus Plavix 75 mg qd) then switch to Plavix 75 mg qd.  Please continue atorvastatin 40 mg daily.  Please follow-up with primary care provider in 2 weeks.      TEJAS: Renal function stable at the time of discharge. Please follow-up with PCP for repeat labs to ensure stability. Please encourage oral intake.        Atrial fibrillation: Rate controlled at discharge. Please continue amiodarone. Please follow-up with primary cardiologist at discharge.        Hypothyroidism: Please continue Synthroid. Follow-up with pcp for further management.        Hypertension: Blood pressure stable at the time of discharge. Please continue amlodipine. Please hold clonidine until seen by primary care

## 2024-07-22 NOTE — CARE COORDINATION
Transition of Care Plan:    RUR: 15%  Prior Level of Functioning: Independent  Disposition: Home w/HH pending acceptance via [facility]  Follow up appointments: PCP and all other recommended specialists  DME needed: N/A  Transportation at discharge: Daughter's at the bedside  IM/IMM Medicare/ letter given: Medicare pt has received, reviewed, and signed 2nd IM letter informing them of their right to appeal the discharge.  Signed copy has been placed on pt bedside chart.  Is patient a [place] and connected with [facility]? N/A  If yes, was [place] transfer form completed and [facility] notified? N/A  Caregiver Contact:   Discharge Caregiver contacted prior to discharge? Daughter's present at the bedside  Care Conference needed? N/A  Barriers to discharge: N/A    14:00PM Consulted w/JESSICA Chaves, face to face and discussed SN ([facility]) until next week.  JESSICA Chaves, in agreement w/this.  Message sent to admissions coordinator for HH acceptance.     BRIAN Mims    13:49PM Writer recv'd a message from admissions coordinator (HH/[facility]) doesn't have SN available until next week.  Other disciplines able to see the patient.  Spoke w/assigned LPN (Deb), assigned LPN voiced patient \"should be fine until SN goes out.\"  Perfect serve message sent to JESSICA Chaves, informing.     BRIAN Mims    13:40PM Both daughter's present at the bedside.  Informed still pending acceptance for HH via [facility].  Daughter's and patient acknowledged understanding.  Patient is ready to leave w/o HH confirmation.      BRIAN Mims    08:55AM Recent clinicals sent to HH agency ([facility]).        BRIAN Mims

## 2024-07-25 ENCOUNTER — TELEPHONE (OUTPATIENT)
Facility: CLINIC | Age: 89
End: 2024-07-25

## 2024-07-25 NOTE — TELEPHONE ENCOUNTER
[facility] called on the behalf of the patient to leave a message about care of plan, for speech therapy behavioral changes after a tia 1 time for four weeks.  radha moralez  [phone]

## 2024-07-26 NOTE — PROGRESS NOTES
Hospitalist Progress Note    NAME:   Julee Mcclellan   : 1931   MRN: [id number]     Date/Time: 2024 2:19 PM  Patient PCP: Amanda Velasquez MD    Estimated discharge date:  Barriers: MRI results, neuro clearance    Hospital Course:  Julee Mcclellan is a 89 yo female with hypertension, hypothyroidism, atrial fibrillation admitted 2024 for TIA.CT head shows no evidence of acute process.  CTA head and neck shows no large vessel occlusion, high-grade stenosis or aneurysm.  Probable meningioma in the frontoparietal region seen on previous imaging.  MRI negative. Carotid duplex negative for significant stenosis. PT/OT recommended [facility]. Provided clinical update to family at the bedside, questions and concerns were addressed. Renal function increasing, repeat labs in the am.     Patient may require transportation at discharge.     Assessment / Plan:  TIA vs metabolic encephalopathy  CT head shows no evidence of acute process.  CTA head and neck shows no large vessel occlusion, high-grade stenosis or aneurysm.  Probable meningioma in the frontoparietal region seen on previous imaging  MRI Brain negative  Carotid duplex showed no significant stenosis  Echo shows normal left ventricular systolic function, EF 55 to 60%.  Left ventricle size is normal.  A1c 5.7  Lipid panel pending  OT/PT/ recommending [facility]  Tele-neurology following  DAPT 30 days ( mg qd plus Plavix 75 mg qd) then switch to Plavix 75 mg qd  Continue atorvastatin 40 mg daily    TEJAS  Creatinine 1.48 > 1.61, BUN 21  Gentle rehydration and avoid nephrotoxic drugs    Atrial fibrillation  Continue amiodarone  Follows with Dr. Gar from [facility]    Hypothyroidism  Continue Synthroid    Hypertension  Continue clonidine, amlodipine    Medical Decision Making:   [] High (any 2)    A. Problems (any 1)  [x] Acute/Chronic Illness/injury posing threat to life or bodily function:  TIA  [] Severe exacerbation of chronic illness:  
      Hospitalist Progress Note    NAME:   Julee Mcclellan   : 1931   MRN: [id number]     Date/Time: 2024 2:52 PM  Patient PCP: Amanda Velasquez MD    Estimated discharge date:24  Barriers: MRI results, neuro clearance    Hospital Course:  Julee Mcclellan is a 89 yo female with hypertension, hypothyroidism, atrial fibrillation admitted 2024 for TIA.CT head shows no evidence of acute process.  CTA head and neck shows no large vessel occlusion, high-grade stenosis or aneurysm.  Probable meningioma in the frontoparietal region seen on previous imaging.  MRI pending    Assessment / Plan:  TIA vs metabolic encephalopathy  CT head shows no evidence of acute process.  CTA head and neck shows no large vessel occlusion, high-grade stenosis or aneurysm.  Probable meningioma in the frontoparietal region seen on previous imaging  MRI Brain pending  Echo shows normal left ventricular systolic function, EF 55 to 60%.  Left ventricle size is normal.  A1c and lipid panel pending  OT/PT/SLP  Tele-neurology following    TEJAS  Creatinine 1.48, BUN 21  Gentle rehydration and avoid nephrotoxic drugs    Atrial fibrillation  Continue amiodarone  Follows with Dr. Gar from [facility]    Hypothyroidism  Continue Synthroid    Hypertension  Continue clonidine, amlodipine    Medical Decision Making:   [] High (any 2)    A. Problems (any 1)  [x] Acute/Chronic Illness/injury posing threat to life or bodily function:  TIA  [] Severe exacerbation of chronic illness:    ---------------------------------------------------------------------  B. Risk of Treatment (any 1)   [] Drugs/treatments that require intensive monitoring for toxicity include:    [] IV ABX requiring serial renal monitoring for nephrotoxicity:     [] IV Narcotic analgesia for adverse drug reaction  [] Aggressive IV diuresis requiring serial monitoring for renal impairment and electrolyte derangements  [] Critical electrolyte abnormalities requiring IV 
  Physician Progress Note      PATIENT:               DAPHNE SANCHEZ  CSN #:                  [id number]  :                       1931  ADMIT DATE:       2024 1:37 PM  DISCH DATE:        2024 2:22 PM  RESPONDING  PROVIDER #:        Luis Cui MD          QUERY TEXT:    Pt admitted with AMS. Pt noted to have TIA. If possible, please document in   progress notes and discharge summary if you are evaluating and /or treating   any of the following:    The medical record reflects the following:    Risk Factors: hypertension, atrial fibrillation.  Clinical Indicators: DS on -TIA vs metabolic encephalopathy: Please   continue DAPT 30 days ( mg qd plus Plavix 75 mg qd) then switch to   Plavix 75 mg qd.  Please continue atorvastatin 40 mg daily.  DS on -Atrial fibrillation: Rate controlled at discharge. Please continue   amiodarone. Please follow-up with primary cardiologist at discharge.  Internal medicine PN on -Carotid duplex showed no significant stenosis  CT head and Neck on - CTA head demonstrates no large vessel occlusion,   high-grade stenosis or aneurysm. 2. CTA neck demonstrates no large vessel   occlusion, high-grade stenosis or aneurysm.  Treatment: DAPT 30 days ( mg qd plus Plavix 75 mg qd) then switch to   Plavix 75 mg qd.     Atorvastatin 40 mg daily.    Thank You,  CHANTELLE Morillo, CDS  Options provided:  -- TIA due to cerebral embolism  -- TIA due to other etiology  -- Other - I will add my own diagnosis  -- Disagree - Not applicable / Not valid  -- Disagree - Clinically unable to determine / Unknown  -- Refer to Clinical Documentation Reviewer    PROVIDER RESPONSE TEXT:    This patient had TIA due to cerebral embolism.    Query created by: Mary Bautista on 2024 6:07 AM      Electronically signed by:  Luis Cui MD 2024 11:20 AM          
4 Eyes Skin Assessment     NAME:  Julee Mcclellan  YOB: 1931  MEDICAL RECORD NUMBER:  [id number]    The patient is being assessed for  Admission    I agree that at least one RN has performed a thorough Head to Toe Skin Assessment on the patient. ALL assessment sites listed below have been assessed.      Areas assessed by both nurses:    Head, Face, Ears, Shoulders, Back, Chest, Arms, Elbows, Hands, Sacrum. Buttock, Coccyx, Ischium, and Legs. Feet and Heels        Does the Patient have a Wound? No noted wound(s)       Bautista Prevention initiated by RN: Yes  Wound Care Orders initiated by RN: No    Pressure Injury (Stage 3,4, Unstageable, DTI, NWPT, and Complex wounds) if present, place Wound referral order by RN under : No    New Ostomies, if present place, Ostomy referral order under : No     Nurse 1 eSignature: Electronically signed by Carolyn Muñoz RN on 7/19/24 at 1:30 AM EDT    **SHARE this note so that the co-signing nurse can place an eSignature**    Nurse 2 eSignature: Electronically signed by Halle Mendoza RN on 7/19/24 at 1:39 AM EDT    
Called [facility] [phone] and spoke to Cheryl to book the patient for tele neuro general rounding in the morning, all pertinent information about the patient were given.  
Dc instructions given to patient and family. Verbalized understanding. Patient dc home with HH via wheelchair with family. No distress noted  
Echo completed. Report to follow.    
OT eval order received and acknowledged. OT eval attempted at 930 however pt was off the floor at CVL. Will continue to follow patient and attempt OT eval at a later time. Thank you.   
PHYSICAL THERAPY EVALUATION  Patient: Julee Mcclellan (89 y.o. female)  Date: 7/19/2024  Primary Diagnosis: Transient alteration of awareness [R40.4]  TIA (transient ischemic attack) [G45.9]  Cerebrovascular accident (CVA), unspecified mechanism (HCC) [I63.9]  AMS (altered mental status) [R41.82]       Precautions: Restrictions/Precautions  Restrictions/Precautions: Fall Risk, Bed Alarm     Recommendations for nursing mobility: Out of bed to chair for meals, AD and gt belt for bed to chair , Amb to bathroom with AD and gait belt, and Assist x1    In place during session: Peripheral IV and EKG/telemetry     ASSESSMENT  Pt is a 89 y.o. female admitted on 7/18/2024 for AMS; pt currently being treated for TIA/CVA workup, acute encephalopathy, TEJAS, hypothyroidism. Head CT negative for acute events. Head CTA negative for large vessel occlusion, showed cervical spondylosis with associated high grade C5-C6 spinal canal narrowing and high grade neuroforaminal narrowing at C5-C7. Pt amb in bathroom with OT upon PT arrival, agreeable to evaluation. Pt A&O x 4.     Based on the objective data described below, the patient currently presents with impaired functional mobility, decreased independence in ADLs, decreased ROM, impaired strength, decreased activity tolerance, decreased coordination, impaired balance, and impaired posture. (See below for objective details and assist levels).     Overall pt tolerated session fair today with no c/o pain throughout session. Pt required CGA with additional time transfers. Pt amb 15 feet with RW, gt belt and SBA to CGA; demonstrates NBOS with short, shuffling, step to gt pattern with no LOB or knee buckling noted. Pt demonstrates slightly impaired coordination right UE, right LE MMT grossly 4-/5, left LE MMT grossly 4/5, sensation intact bilateral LE, UE and face, intact bilateral . Patient and/or family was verbally educated on the BE FAST acronym for signs/symptoms of CVA and 
Pt  back on unit  
Pt back from mri.  
Pt off unit for MRI.  
Pt off unit for tests.  
Pt was slightly confused in the am. Called daughter for reassurance, as pt   thought family wasn't coming today to visit .  Pt up to bathroom multiple times. No BM. Worked with physical therapy.   Pt bathed.   Tolerated all meals.  
Spiritual Care Assessment/Progress Note  [facility]    Name: Julee Mcclellan MRN: [id number]    Age: 89 y.o.     Sex: female   Language: English     Date: 7/19/2024            Total Time Calculated: 20 min              Spiritual Assessment begun in [facility] [place] MED/SURG  Service Provided For: Patient  Referral/Consult From: Nurse, Patient  Encounter Overview/Reason: Initial Encounter    Spiritual beliefs:      [x] Involved in a judie tradition/spiritual practice:      [] Supported by a judie community:      [] Claims no spiritual orientation:      [] Seeking spiritual identity:           [] Adheres to an individual form of spirituality:      [] Not able to assess:                Identified resources for coping and support system:   Support System: Children, Family members       [x] Prayer                  [] Devotional reading               [] Music                  [] Guided Imagery     [] Pet visits                                        [] Other: (COMMENT)     Specific area/focus of visit   Encounter:    Crisis:    Spiritual/Emotional needs: Type: Spiritual Support  Ritual, Rites and Sacraments:    Grief, Loss, and Adjustments:    Ethics/Mediation:    Behavioral Health:    Palliative Care:    Advance Care Planning:      Plan/Referrals: Other (Comment) ( follow-up is available on referral)    Narrative:  visited pt in response for consult. Pt was sitting in bed. Pt is calm and cheerful. Pt asked for prayer as she shared you can never have to many. Pt shared lovingly about her family and the care and support she receives from them. Pt appreciated the visit.      prayed with pt and engaged in conversation about judie and family.  shared encouraging and inspirational words.  maintained spiritual presence.     follow-up is available on referral.    Chelle Guzmanlain Intern  Contact [facility] at [phone]       
159/72 98.2 °F (36.8 °C) Oral 54 18 96 %           Intake/Output Summary (Last 24 hours) at 7/20/2024 1538  Last data filed at 7/19/2024 1724  Gross per 24 hour   Intake 180 ml   Output --   Net 180 ml          I had a face to face encounter and independently examined this patient on 7/20/2024, as outlined below:    Review of Systems   Constitutional:  Negative for diaphoresis, fatigue and fever.   Respiratory:  Negative for cough and shortness of breath.    Cardiovascular:  Negative for chest pain and leg swelling.   Gastrointestinal:  Negative for abdominal pain, constipation and diarrhea.   Genitourinary:  Negative for difficulty urinating and dysuria.   Musculoskeletal:  Negative for back pain.   Skin:  Negative for color change.   Neurological:  Positive for speech difficulty (yesterday). Negative for dizziness, seizures, syncope, weakness and light-headedness.   Psychiatric/Behavioral:  Positive for confusion.         PHYSICAL EXAM:  Physical Exam  Vitals and nursing note reviewed.   Constitutional:       Appearance: She is not ill-appearing.   Eyes:      Extraocular Movements: Extraocular movements intact.   Cardiovascular:      Rate and Rhythm: Normal rate and regular rhythm.   Pulmonary:      Effort: Pulmonary effort is normal.      Breath sounds: Normal breath sounds.   Abdominal:      Palpations: Abdomen is soft.      Tenderness: There is no abdominal tenderness.   Musculoskeletal:         General: Normal range of motion.   Neurological:      General: No focal deficit present.      Mental Status: She is alert and oriented to person, place, and time.          Reviewed most current lab test results and cultures  YES  Reviewed most current radiology test results   YES  Review and summation of old records today    NO  Reviewed patient's current orders and MAR    YES  PMH/SH reviewed - no change compared to H&P  ________________________________________________________________________  Care Plan discussed 
rapid bolus intravenous contrast administration.   Coronal and sagittal reformations and 3D/MIP  post processing were performed.  CT dose reduction was achieved through use of a standardized protocol tailored for this examination and automatic exposure control for dose modulation. This study was analyzed by the [employer] algorithm. FINDINGS: VESSELS: Intracranial:  There is no evidence of large vessel occlusion in the anterior circulation. No high-grade stenosis of the intracranial ICAs, MCAs or ACAs. Calcification of the bilateral carotid siphons without significant stenosis.. There is patent  anterior communicating artery. Fetal takeoff of the right PCA, developmental variant. There is no evidence of large vessel occlusion in the posterior circulation. No flow-limiting stenosis of bilateral intracranial vertebral arteries, basilar artery, and bilateral PCAs. No obvious intracranial aneurysm. There is no evidence of vascular malformation. The dural venous sinuses and deep cerebral venous system are patent. Neck: NASCET method was utilized for calculating stenosis. The aortic arch is unremarkable. The common carotid arteries demonstrate no significant stenosis. There is no evidence of significant stenosis in the cervical right internal carotid artery. There is no evidence of significant stenosis in the cervical left internal carotid artery. Carotid bulbs/proximal ICAs calcifications without significant luminal narrowing. There is 0% stenosis of the right carotid artery. There is 0% stenosis of the left carotid artery. There is a codominant vertebrobasilar arterial system. The cervical vertebral arteries are normal in course, size and contour without significant stenosis. OTHERS: Brain parenchyma demonstrates no suggestion of acute infarct. Delayed post contrast images demonstrate left parafalcine dural based enhancing lesion measuring 1.2 x 0.9 cm along frontoparietal region (image 25 of series 501), likely

## 2024-08-01 DIAGNOSIS — N18.31 STAGE 3A CHRONIC KIDNEY DISEASE (HCC): ICD-10-CM

## 2024-08-01 DIAGNOSIS — E03.9 HYPOTHYROIDISM, ADULT: ICD-10-CM

## 2024-08-01 DIAGNOSIS — D64.9 MILD ANEMIA: ICD-10-CM

## 2024-08-04 DIAGNOSIS — M54.31 SCIATICA OF RIGHT SIDE: ICD-10-CM

## 2024-08-05 RX ORDER — FUROSEMIDE 20 MG/1
20 TABLET ORAL DAILY
Qty: 90 TABLET | Refills: 3 | Status: SHIPPED | OUTPATIENT
Start: 2024-08-05 | End: 2024-08-06

## 2024-08-05 RX ORDER — GABAPENTIN 300 MG/1
CAPSULE ORAL
Qty: 90 CAPSULE | Refills: 1 | Status: SHIPPED | OUTPATIENT
Start: 2024-08-05 | End: 2025-02-01

## 2024-08-06 ENCOUNTER — OFFICE VISIT (OUTPATIENT)
Facility: CLINIC | Age: 89
End: 2024-08-06
Payer: MEDICARE

## 2024-08-06 VITALS
DIASTOLIC BLOOD PRESSURE: 75 MMHG | TEMPERATURE: 97.5 F | SYSTOLIC BLOOD PRESSURE: 145 MMHG | OXYGEN SATURATION: 98 % | HEIGHT: 66 IN | WEIGHT: 131.2 LBS | HEART RATE: 60 BPM | BODY MASS INDEX: 21.08 KG/M2

## 2024-08-06 DIAGNOSIS — E03.9 HYPOTHYROIDISM, ADULT: ICD-10-CM

## 2024-08-06 DIAGNOSIS — E03.9 HYPOTHYROIDISM, ADULT: Primary | ICD-10-CM

## 2024-08-06 DIAGNOSIS — R55 SYNCOPE, UNSPECIFIED SYNCOPE TYPE: ICD-10-CM

## 2024-08-06 DIAGNOSIS — I10 BENIGN ESSENTIAL HYPERTENSION: ICD-10-CM

## 2024-08-06 PROBLEM — C50.919 BREAST CANCER (HCC): Status: RESOLVED | Noted: 2023-02-15 | Resolved: 2024-08-06

## 2024-08-06 PROBLEM — D68.69 SECONDARY HYPERCOAGULABLE STATE (HCC): Status: RESOLVED | Noted: 2023-09-05 | Resolved: 2024-08-06

## 2024-08-06 PROCEDURE — 99214 OFFICE O/P EST MOD 30 MIN: CPT | Performed by: INTERNAL MEDICINE

## 2024-08-06 PROCEDURE — 1111F DSCHRG MED/CURRENT MED MERGE: CPT | Performed by: INTERNAL MEDICINE

## 2024-08-06 PROCEDURE — G8420 CALC BMI NORM PARAMETERS: HCPCS | Performed by: INTERNAL MEDICINE

## 2024-08-06 PROCEDURE — 1123F ACP DISCUSS/DSCN MKR DOCD: CPT | Performed by: INTERNAL MEDICINE

## 2024-08-06 PROCEDURE — 1036F TOBACCO NON-USER: CPT | Performed by: INTERNAL MEDICINE

## 2024-08-06 PROCEDURE — G8427 DOCREV CUR MEDS BY ELIG CLIN: HCPCS | Performed by: INTERNAL MEDICINE

## 2024-08-06 PROCEDURE — 1090F PRES/ABSN URINE INCON ASSESS: CPT | Performed by: INTERNAL MEDICINE

## 2024-08-06 ASSESSMENT — ENCOUNTER SYMPTOMS
NAUSEA: 0
ABDOMINAL PAIN: 0
SHORTNESS OF BREATH: 0
VOMITING: 0
DIARRHEA: 0
COUGH: 0

## 2024-08-06 NOTE — PROGRESS NOTES
Chief Complaint   Patient presents with    Hypertension    Follow-Up from Hospital    BP (!) 145/75 (Site: Right Upper Arm, Position: Sitting, Cuff Size: Medium Adult) Comment: RECHECK BP  Pulse 60   Temp 97.5 °F (36.4 °C) (Oral)   Ht 1.676 m (5' 6\")   Wt 59.5 kg (131 lb 3.2 oz)   SpO2 98%   BMI 21.18 kg/m²  1. Have you been to the ER, urgent care clinic since your last visit?  Hospitalized since your last visit?Yes Where: [facility]    2. Have you seen or consulted any other health care providers outside of the [facility] since your last visit?  Include any pap smears or colon screening. No   
the brain prior to and following  uneventful IV contrast administration.    FINDINGS:    There is no acute infarct.  No hemorrhage, extra-axial fluid collection, or mass  effect.   Scattered periventricular and subcortical white matter T2/FLAIR  hyperintensity, nonspecific and likely microangiopathic ischemic changes. The  ventricles are normal in size and position.  There is no cerebellar tonsillar herniation. Expected arterial flow-voids are  present.    Status post bilateral lens replacement. The orbits are otherwise unremarkable.  Mild diffuse mucosal thickening of the paranasal sinuses. The mastoids are free  of fluid bilaterally. No significant osseous or scalp lesions are identified.  Visualized craniocervical junction and proximal cervical spine grossly within  normal limits.    Impression  No acute brain infarct or intracranial hemorrhage.    Mild chronic microangiopathic white matter changes.      Electronically signed by MICHOACANO BILL    CT Result (most recent):  CTA HEAD NECK W CONTRAST 07/18/2024    Narrative  EXAM:  CTA CODE NEURO HEAD AND NECK W CONT    INDICATION:   Stroke    COMPARISON: Same date 7/18/2024 CT head.    CONTRAST: 100 mL of Isovue-370.    TECHNIQUE:  Unenhanced  images were obtained to localize the volume for  acquisition.  Multislice helical axial CT angiography was performed from the  aortic arch to the top of the head during uneventful rapid bolus intravenous  contrast administration.   Coronal and sagittal reformations and 3D/MIP  post  processing were performed.  CT dose reduction was achieved through use of a standardized protocol tailored  for this examination and automatic exposure control for dose modulation. This  study was analyzed by the [employer] algorithm.    FINDINGS:    VESSELS:  Intracranial:  There is no evidence of large vessel occlusion in the anterior  circulation. No high-grade stenosis of the intracranial ICAs, MCAs or ACAs.  Calcification of the bilateral

## 2024-09-17 LAB
ALBUMIN SERPL-MCNC: 3.6 G/DL (ref 3.6–4.6)
ALP SERPL-CCNC: 84 IU/L (ref 44–121)
ALT SERPL-CCNC: 22 IU/L (ref 0–32)
AST SERPL-CCNC: 25 IU/L (ref 0–40)
BASOPHILS # BLD AUTO: 0 X10E3/UL (ref 0–0.2)
BASOPHILS NFR BLD AUTO: 1 %
BILIRUB SERPL-MCNC: 0.3 MG/DL (ref 0–1.2)
BUN SERPL-MCNC: 20 MG/DL (ref 10–36)
BUN/CREAT SERPL: 13 (ref 12–28)
CALCIUM SERPL-MCNC: 9.2 MG/DL (ref 8.7–10.3)
CHLORIDE SERPL-SCNC: 106 MMOL/L (ref 96–106)
CO2 SERPL-SCNC: 22 MMOL/L (ref 20–29)
CREAT SERPL-MCNC: 1.6 MG/DL (ref 0.57–1)
EGFRCR SERPLBLD CKD-EPI 2021: 30 ML/MIN/1.73
EOSINOPHIL # BLD AUTO: 0 X10E3/UL (ref 0–0.4)
EOSINOPHIL NFR BLD AUTO: 1 %
ERYTHROCYTE [DISTWIDTH] IN BLOOD BY AUTOMATED COUNT: 13.2 % (ref 11.7–15.4)
GLOBULIN SER CALC-MCNC: 3.5 G/DL (ref 1.5–4.5)
GLUCOSE SERPL-MCNC: 109 MG/DL (ref 70–99)
HCT VFR BLD AUTO: 32.5 % (ref 34–46.6)
HGB BLD-MCNC: 10.8 G/DL (ref 11.1–15.9)
IMM GRANULOCYTES # BLD AUTO: 0 X10E3/UL (ref 0–0.1)
IMM GRANULOCYTES NFR BLD AUTO: 0 %
LYMPHOCYTES # BLD AUTO: 1.4 X10E3/UL (ref 0.7–3.1)
LYMPHOCYTES NFR BLD AUTO: 23 %
MCH RBC QN AUTO: 32.3 PG (ref 26.6–33)
MCHC RBC AUTO-ENTMCNC: 33.2 G/DL (ref 31.5–35.7)
MCV RBC AUTO: 97 FL (ref 79–97)
MONOCYTES # BLD AUTO: 0.6 X10E3/UL (ref 0.1–0.9)
MONOCYTES NFR BLD AUTO: 10 %
NEUTROPHILS # BLD AUTO: 4 X10E3/UL (ref 1.4–7)
NEUTROPHILS NFR BLD AUTO: 65 %
PLATELET # BLD AUTO: 179 X10E3/UL (ref 150–450)
POTASSIUM SERPL-SCNC: 4.2 MMOL/L (ref 3.5–5.2)
PROT SERPL-MCNC: 7.1 G/DL (ref 6–8.5)
RBC # BLD AUTO: 3.34 X10E6/UL (ref 3.77–5.28)
SODIUM SERPL-SCNC: 140 MMOL/L (ref 134–144)
TSH SERPL DL<=0.005 MIU/L-ACNC: 8.38 UIU/ML (ref 0.45–4.5)
WBC # BLD AUTO: 6.1 X10E3/UL (ref 3.4–10.8)

## 2024-10-10 NOTE — TELEPHONE ENCOUNTER
Attempted to call daughter Rajwinder to verify thyroid medication and how she is taking it?   Unable to reach daughter. Number in phone as call restrictions.

## 2024-10-17 RX ORDER — LEVOTHYROXINE SODIUM 100 UG/1
TABLET ORAL
Qty: 97 TABLET | Refills: 1 | Status: SHIPPED | OUTPATIENT
Start: 2024-10-17

## 2024-10-25 RX ORDER — LEVOTHYROXINE SODIUM 100 UG/1
TABLET ORAL
Qty: 97 TABLET | Refills: 1 | Status: SHIPPED | OUTPATIENT
Start: 2024-10-25

## 2024-11-13 ENCOUNTER — APPOINTMENT (OUTPATIENT)
Facility: HOSPITAL | Age: 89
DRG: 682 | End: 2024-11-13
Payer: MEDICARE

## 2024-11-13 ENCOUNTER — HOSPITAL ENCOUNTER (INPATIENT)
Facility: HOSPITAL | Age: 89
LOS: 5 days | Discharge: NURSING, REHAB OR LONG-TERM CARE | DRG: 682 | End: 2024-11-18
Attending: STUDENT IN AN ORGANIZED HEALTH CARE EDUCATION/TRAINING PROGRAM | Admitting: HOSPITALIST
Payer: MEDICARE

## 2024-11-13 DIAGNOSIS — R41.82 ALTERED MENTAL STATUS, UNSPECIFIED ALTERED MENTAL STATUS TYPE: Primary | ICD-10-CM

## 2024-11-13 DIAGNOSIS — D32.9 MENINGIOMA (HCC): ICD-10-CM

## 2024-11-13 PROBLEM — G93.41 METABOLIC ENCEPHALOPATHY: Status: ACTIVE | Noted: 2024-11-13

## 2024-11-13 LAB
ALBUMIN SERPL-MCNC: 3.1 G/DL (ref 3.5–5)
ALBUMIN/GLOB SERPL: 0.7 (ref 1.1–2.2)
ALP SERPL-CCNC: 91 U/L (ref 45–117)
ALT SERPL-CCNC: 21 U/L (ref 12–78)
ANION GAP SERPL CALC-SCNC: 7 MMOL/L (ref 2–12)
AST SERPL W P-5'-P-CCNC: 20 U/L (ref 15–37)
BASOPHILS # BLD: 0 K/UL (ref 0–0.1)
BASOPHILS NFR BLD: 0 % (ref 0–1)
BILIRUB SERPL-MCNC: 0.5 MG/DL (ref 0.2–1)
BUN SERPL-MCNC: 23 MG/DL (ref 6–20)
BUN/CREAT SERPL: 11 (ref 12–20)
CA-I BLD-MCNC: 8.9 MG/DL (ref 8.5–10.1)
CHLORIDE SERPL-SCNC: 109 MMOL/L (ref 97–108)
CO2 SERPL-SCNC: 24 MMOL/L (ref 21–32)
CREAT SERPL-MCNC: 2.02 MG/DL (ref 0.55–1.02)
DIFFERENTIAL METHOD BLD: ABNORMAL
EOSINOPHIL # BLD: 0 K/UL (ref 0–0.4)
EOSINOPHIL NFR BLD: 0 % (ref 0–7)
ERYTHROCYTE [DISTWIDTH] IN BLOOD BY AUTOMATED COUNT: 13.5 % (ref 11.5–14.5)
GLOBULIN SER CALC-MCNC: 4.2 G/DL (ref 2–4)
GLUCOSE SERPL-MCNC: 111 MG/DL (ref 65–100)
HCT VFR BLD AUTO: 35.9 % (ref 35–47)
HGB BLD-MCNC: 11.8 G/DL (ref 11.5–16)
IMM GRANULOCYTES # BLD AUTO: 0.1 K/UL (ref 0–0.04)
IMM GRANULOCYTES NFR BLD AUTO: 0 % (ref 0–0.5)
INR PPP: 1.1 (ref 0.9–1.1)
LYMPHOCYTES # BLD: 1.3 K/UL (ref 0.8–3.5)
LYMPHOCYTES NFR BLD: 11 % (ref 12–49)
MAGNESIUM SERPL-MCNC: 2 MG/DL (ref 1.6–2.4)
MCH RBC QN AUTO: 32.1 PG (ref 26–34)
MCHC RBC AUTO-ENTMCNC: 32.9 G/DL (ref 30–36.5)
MCV RBC AUTO: 97.6 FL (ref 80–99)
MONOCYTES # BLD: 1.1 K/UL (ref 0–1)
MONOCYTES NFR BLD: 9 % (ref 5–13)
NEUTS SEG # BLD: 9.4 K/UL (ref 1.8–8)
NEUTS SEG NFR BLD: 80 % (ref 32–75)
NRBC # BLD: 0 K/UL (ref 0–0.01)
NRBC BLD-RTO: 0 PER 100 WBC
PLATELET # BLD AUTO: 191 K/UL (ref 150–400)
PMV BLD AUTO: 12 FL (ref 8.9–12.9)
POTASSIUM SERPL-SCNC: 3.9 MMOL/L (ref 3.5–5.1)
PROT SERPL-MCNC: 7.3 G/DL (ref 6.4–8.2)
PROTHROMBIN TIME: 14.3 SEC (ref 11.9–14.6)
RBC # BLD AUTO: 3.68 M/UL (ref 3.8–5.2)
SODIUM SERPL-SCNC: 140 MMOL/L (ref 136–145)
TROPONIN I SERPL HS-MCNC: 8 NG/L (ref 0–51)
TSH SERPL DL<=0.05 MIU/L-ACNC: 5.3 UIU/ML (ref 0.36–3.74)
WBC # BLD AUTO: 11.9 K/UL (ref 3.6–11)

## 2024-11-13 PROCEDURE — 71045 X-RAY EXAM CHEST 1 VIEW: CPT

## 2024-11-13 PROCEDURE — 80053 COMPREHEN METABOLIC PANEL: CPT

## 2024-11-13 PROCEDURE — 83735 ASSAY OF MAGNESIUM: CPT

## 2024-11-13 PROCEDURE — 99285 EMERGENCY DEPT VISIT HI MDM: CPT

## 2024-11-13 PROCEDURE — 84484 ASSAY OF TROPONIN QUANT: CPT

## 2024-11-13 PROCEDURE — 93005 ELECTROCARDIOGRAM TRACING: CPT | Performed by: STUDENT IN AN ORGANIZED HEALTH CARE EDUCATION/TRAINING PROGRAM

## 2024-11-13 PROCEDURE — 2580000003 HC RX 258: Performed by: HOSPITALIST

## 2024-11-13 PROCEDURE — 70498 CT ANGIOGRAPHY NECK: CPT

## 2024-11-13 PROCEDURE — 6360000004 HC RX CONTRAST MEDICATION: Performed by: STUDENT IN AN ORGANIZED HEALTH CARE EDUCATION/TRAINING PROGRAM

## 2024-11-13 PROCEDURE — 85025 COMPLETE CBC W/AUTO DIFF WBC: CPT

## 2024-11-13 PROCEDURE — 1100000000 HC RM PRIVATE

## 2024-11-13 PROCEDURE — 70450 CT HEAD/BRAIN W/O DYE: CPT

## 2024-11-13 PROCEDURE — 85610 PROTHROMBIN TIME: CPT

## 2024-11-13 PROCEDURE — 4A03X5D MEASUREMENT OF ARTERIAL FLOW, INTRACRANIAL, EXTERNAL APPROACH: ICD-10-PCS | Performed by: STUDENT IN AN ORGANIZED HEALTH CARE EDUCATION/TRAINING PROGRAM

## 2024-11-13 PROCEDURE — 84443 ASSAY THYROID STIM HORMONE: CPT

## 2024-11-13 PROCEDURE — 0042T CT BRAIN PERFUSION: CPT

## 2024-11-13 RX ORDER — CALCIUM CARBONATE 500(1250)
500 TABLET ORAL DAILY
COMMUNITY

## 2024-11-13 RX ORDER — ENOXAPARIN SODIUM 100 MG/ML
30 INJECTION SUBCUTANEOUS DAILY
Status: DISCONTINUED | OUTPATIENT
Start: 2024-11-13 | End: 2024-11-18 | Stop reason: HOSPADM

## 2024-11-13 RX ORDER — POLYETHYLENE GLYCOL 3350 17 G/17G
17 POWDER, FOR SOLUTION ORAL DAILY PRN
Status: DISCONTINUED | OUTPATIENT
Start: 2024-11-13 | End: 2024-11-18 | Stop reason: HOSPADM

## 2024-11-13 RX ORDER — ACETAMINOPHEN 325 MG/1
650 TABLET ORAL EVERY 6 HOURS PRN
Status: DISCONTINUED | OUTPATIENT
Start: 2024-11-13 | End: 2024-11-18 | Stop reason: HOSPADM

## 2024-11-13 RX ORDER — ONDANSETRON 2 MG/ML
4 INJECTION INTRAMUSCULAR; INTRAVENOUS EVERY 6 HOURS PRN
Status: DISCONTINUED | OUTPATIENT
Start: 2024-11-13 | End: 2024-11-18 | Stop reason: HOSPADM

## 2024-11-13 RX ORDER — SODIUM CHLORIDE 0.9 % (FLUSH) 0.9 %
10 SYRINGE (ML) INJECTION PRN
Status: DISCONTINUED | OUTPATIENT
Start: 2024-11-13 | End: 2024-11-18 | Stop reason: HOSPADM

## 2024-11-13 RX ORDER — SODIUM CHLORIDE 0.9 % (FLUSH) 0.9 %
5-40 SYRINGE (ML) INJECTION EVERY 12 HOURS SCHEDULED
Status: DISCONTINUED | OUTPATIENT
Start: 2024-11-13 | End: 2024-11-18 | Stop reason: HOSPADM

## 2024-11-13 RX ORDER — ONDANSETRON 4 MG/1
4 TABLET, ORALLY DISINTEGRATING ORAL EVERY 8 HOURS PRN
Status: DISCONTINUED | OUTPATIENT
Start: 2024-11-13 | End: 2024-11-18 | Stop reason: HOSPADM

## 2024-11-13 RX ORDER — SODIUM CHLORIDE, SODIUM LACTATE, POTASSIUM CHLORIDE, CALCIUM CHLORIDE 600; 310; 30; 20 MG/100ML; MG/100ML; MG/100ML; MG/100ML
INJECTION, SOLUTION INTRAVENOUS CONTINUOUS
Status: DISPENSED | OUTPATIENT
Start: 2024-11-13 | End: 2024-11-14

## 2024-11-13 RX ORDER — SODIUM CHLORIDE 9 MG/ML
INJECTION, SOLUTION INTRAVENOUS PRN
Status: DISCONTINUED | OUTPATIENT
Start: 2024-11-13 | End: 2024-11-18 | Stop reason: HOSPADM

## 2024-11-13 RX ORDER — IOPAMIDOL 755 MG/ML
100 INJECTION, SOLUTION INTRAVASCULAR
Status: COMPLETED | OUTPATIENT
Start: 2024-11-13 | End: 2024-11-13

## 2024-11-13 RX ORDER — THIAMINE MONONITRATE (VIT B1) 100 MG
100 TABLET ORAL DAILY
COMMUNITY

## 2024-11-13 RX ORDER — ACETAMINOPHEN 650 MG/1
650 SUPPOSITORY RECTAL EVERY 6 HOURS PRN
Status: DISCONTINUED | OUTPATIENT
Start: 2024-11-13 | End: 2024-11-18 | Stop reason: HOSPADM

## 2024-11-13 RX ADMIN — SODIUM CHLORIDE, POTASSIUM CHLORIDE, SODIUM LACTATE AND CALCIUM CHLORIDE: 600; 310; 30; 20 INJECTION, SOLUTION INTRAVENOUS at 20:28

## 2024-11-13 RX ADMIN — IOPAMIDOL 100 ML: 755 INJECTION, SOLUTION INTRAVENOUS at 12:08

## 2024-11-13 ASSESSMENT — PAIN SCALES - GENERAL
PAINLEVEL_OUTOF10: 0
PAINLEVEL_OUTOF10: 0

## 2024-11-13 NOTE — H&P
deficit.    Lines/Drains/Airways/Wounds:  [unfilled]    LABS AND IMAGING   CBC  [unfilled]    Last 3 Hemoglobin  Lab Results   Component Value Date/Time    HGB 11.8 11/13/2024 11:54 AM    HGB 10.8 09/16/2024 10:48 AM    HGB 11.8 07/22/2024 05:50 AM     Last 3 WBC/ANC  Lab Results   Component Value Date/Time    WBC 11.9 11/13/2024 11:54 AM    WBC 6.1 09/16/2024 10:48 AM    WBC 6.7 07/22/2024 05:50 AM     No components found for: \"GRNLOCTYABS\"  Last 3 Platelets  No results found for: \"PLATELET\"  Chemistry  [unfilled]  [unfilled]  No results found for: \"LDH\"  Coagulation Studies  Lab Results   Component Value Date/Time    INR 1.1 11/13/2024 11:54 AM     Liver Function Studies  Lab Results   Component Value Date/Time    ALT 21 11/13/2024 11:54 AM    AST 20 11/13/2024 11:54 AM    ALKPHOS 91 11/13/2024 11:54 AM    ALKPHOS 84 09/16/2024 10:48 AM    ALBUMIN 3.1 11/13/2024 11:54 AM       Recent Imaging        Relevant labs and imaging reviewed    ASSESSMENT AND PLAN     Possible UTI  Initiated on ceftriaxone  Follow-up with urine culture    Metabolic encephalopathy  Most likely due to the possible UTI  Head CT is unremarkable.    TEJAS on CKD stage III  Most likely due to poor oral intake  Continue IV fluid resuscitation, avoid nephrotoxins    Hypertension  Blood pressure control suboptimal  Initiated on amlodipine    Hypothyroidism  Continue levothyroxine    DVT prophylaxis  Lovenox    Case d/w ED physician  Time spent on admission: 40 minutes  Hospitalist, Internal Medicine  11/13/2024 at 4:26 PM

## 2024-11-13 NOTE — ED PROVIDER NOTES
EMERGENCY DEPARTMENT HISTORY AND PHYSICAL EXAM      Date: 11/13/2024  Patient Name: Julee Mcclellan  MRN: [id number]  YOB: 1931  Date of evaluation: 11/13/2024  Provider: Baljit Cárdenas MD     History of Present Illness     Chief Complaint   Patient presents with    Altered Mental Status    Shortness of Breath    Fatigue       History Provided By: Family    HPI: Julee Mcclellan, 89 y.o. female with past medical history as listed and reviewed below presenting to the ED for evaluation of altered mental status.  Per the family the patient was doing well up until yesterday about 5 PM, about 18 hours prior to arrival.  The patient reportedly is usually ambulating often and more talkative, since that time has been less ambulatory, generally weak appearing and less talkative and more confused.  They did not notice any focal deficits, the patient has not had any other symptoms including nausea or vomiting or fever or chills or chest pain or abdominal pain or back pain.  Has not had any other notable symptoms.  On arrival during triage a level 2 stroke alert was called due to possible left-sided deficits.  Reportedly patient has history of TIA.    Medical History     Past Medical History:  Past Medical History:   Diagnosis Date    Benign essential hypertension     Breast cancer (HCC)     left side-2005    Breast cancer (HCC) 02/15/2023    left side-2005       Closed nondisplaced oblique fracture of shaft of right fibula with routine healing, subsequent encounter     Constipation in female     Elevated glucose     Elevated serum creatinine     Hypercholesteremia     Hyperthyroidism     Hypothyroidism, adult     Mild anemia     Sciatica of right side     Secondary hypercoagulable state (HCC) 09/05/2023    Smoldering multiple myeloma     TIA (transient ischemic attack)     05/2024 per family       Past Surgical History:  Past Surgical History:   Procedure Laterality Date    CYST REMOVAL      2/2004

## 2024-11-13 NOTE — ED TRIAGE NOTES
Per EMS, family called for ams, pt was not \"acting right around midnight\" Pt slept in the floor all night because the daughter couldn't get her out of the floor. Pt axo4 per ems, gcs 15. Family reports she is lethargic and sleeping a lot over the last couple of days. Per home health nurse pt was c/o using the restroom a lot yesterday.     Pt reports slight dizziness, weakness, HA, and sob. Pts daughter reports pt was confused last night, LKW 1200am. Pt axo4 for writer.     Hx of breast cancer, htn, mastectomy, TIA approx 6 months, no thinners, takes aspirin at home

## 2024-11-13 NOTE — CARE COORDINATION
11/13/24 7007   Service Assessment   Patient Orientation Alert and Oriented;Person   Cognition Dementia / Early Alzheimer's   History Provided By Child/Family   Primary Caregiver Family   Accompanied By/Relationship Ricardo Purdy & Tianna   Support Systems Children;Family Members   Patient's Healthcare Decision Maker is: Legal Next of Kin   PCP Verified by CM Yes  (Amanda Velasquez - seen 5 mos ago.)   Last Visit to PCP Within last 6 months   Prior Functional Level Assistance with the following:;Independent in ADLs/IADLs;Mobility  (Walker)   Current Functional Level Assistance with the following:;Independent in ADLs/IADLs;Mobility  (Walker)   Can patient return to prior living arrangement Yes   Ability to make needs known: Fair   Family able to assist with home care needs: Yes   Would you like for me to discuss the discharge plan with any other family members/significant others, and if so, who? Yes  (Ricardo Purdy/Tianna.)   Financial Resources Medicare   Community Resources None   CM/SW Referral Other (see comment)  (None)   Social/Functional History   Lives With Daughter  (Tianna Walton)   Type of Home House   Home Layout Two level   Home Access Stairs to enter with rails   Entrance Stairs - Number of Steps 8   Bathroom Shower/Tub Tub/Shower unit;Shower chair with back   Bathroom Equipment Grab bars in shower;Grab bars around toilet   Bathroom Accessibility Accessible   Home Equipment Walker - Standard   Receives Help From Family   ADL Assistance Independent   Homemaking Assistance Independent   Homemaking Responsibilities Yes   Ambulation Assistance Needs assistance  (Walker)   Transfer Assistance Independent   Active  No   Occupation Retired   Discharge Planning   Type of Residence House   Living Arrangements Children  (Lives with daughter.)   Current Services Prior To Admission None   Potential Assistance Needed N/A   DME Ordered? No   Potential Assistance Purchasing Medications No   Type of Home

## 2024-11-13 NOTE — ED NOTES
L2 stroke called at this time  
Lab called for add-ons  
Pt placed on purewick and given warm blankets at this time  
Pts family at bedside, pt resting quietly, no complaints at this time  
WO CONTRAST   Final Result      1. No acute process on noncontrast CT. No significant change.   2. 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            Electronically signed by Jose Pearson        Abnormal labs:   Abnormal Labs Reviewed   CBC WITH AUTO DIFFERENTIAL - Abnormal; Notable for the following components:       Result Value    WBC 11.9 (*)     RBC 3.68 (*)     Neutrophils % 80 (*)     Lymphocytes % 11 (*)     Neutrophils Absolute 9.4 (*)     Monocytes Absolute 1.1 (*)     Immature Granulocytes Absolute 0.1 (*)     All other components within normal limits   TSH - Abnormal; Notable for the following components:    TSH, 3rd Generation 5.30 (*)     All other components within normal limits   COMPREHENSIVE METABOLIC PANEL - Abnormal; Notable for the following components:    Chloride 109 (*)     Glucose 111 (*)     BUN 23 (*)     Creatinine 2.02 (*)     BUN/Creatinine Ratio 11 (*)     Est, Glom Filt Rate 23 (*)     Albumin 3.1 (*)     Globulin 4.2 (*)     Albumin/Globulin Ratio 0.7 (*)     All other components within normal limits       Background  Allergies:   Allergies   Allergen Reactions    Shellfish-Derived Products Hives     History:   Past Medical History:   Diagnosis Date    Benign essential hypertension     Breast cancer (HCC)     left side-2005    Breast cancer (HCC) 02/15/2023    left side-2005       Closed nondisplaced oblique fracture of shaft of right fibula with routine healing, subsequent encounter     Constipation in female     Elevated glucose     Elevated serum creatinine     Hypercholesteremia     Hyperthyroidism     Hypothyroidism, adult     Mild anemia     Sciatica of right side     Secondary hypercoagulable state (HCC) 09/05/2023    Smoldering multiple myeloma     TIA (transient ischemic attack)     05/2024 per family       Assessment  Vitals:    Level of Consciousness: Alert (0)   Vitals:    11/13/24 1500 11/13/24 1504 11/13/24 1515 11/13/24 1530   BP: (!) 151/59  (!) 161/60

## 2024-11-14 LAB
ANION GAP SERPL CALC-SCNC: 7 MMOL/L (ref 2–12)
APPEARANCE UR: CLEAR
BACTERIA URNS QL MICRO: NEGATIVE /HPF
BILIRUB UR QL: NEGATIVE
BUN SERPL-MCNC: 20 MG/DL (ref 6–20)
BUN/CREAT SERPL: 13 (ref 12–20)
CA-I BLD-MCNC: 8.6 MG/DL (ref 8.5–10.1)
CHLORIDE SERPL-SCNC: 110 MMOL/L (ref 97–108)
CO2 SERPL-SCNC: 23 MMOL/L (ref 21–32)
COLOR UR: ABNORMAL
CREAT SERPL-MCNC: 1.5 MG/DL (ref 0.55–1.02)
EKG ATRIAL RATE: 63 BPM
EKG DIAGNOSIS: NORMAL
EKG P AXIS: 72 DEGREES
EKG P-R INTERVAL: 194 MS
EKG Q-T INTERVAL: 382 MS
EKG QRS DURATION: 82 MS
EKG QTC CALCULATION (BAZETT): 390 MS
EKG R AXIS: -43 DEGREES
EKG T AXIS: 80 DEGREES
EKG VENTRICULAR RATE: 63 BPM
EPITH CASTS URNS QL MICRO: ABNORMAL /LPF
ERYTHROCYTE [DISTWIDTH] IN BLOOD BY AUTOMATED COUNT: 13.3 % (ref 11.5–14.5)
GLUCOSE SERPL-MCNC: 82 MG/DL (ref 65–100)
GLUCOSE UR STRIP.AUTO-MCNC: NEGATIVE MG/DL
HCT VFR BLD AUTO: 34.2 % (ref 35–47)
HGB BLD-MCNC: 11.4 G/DL (ref 11.5–16)
HGB UR QL STRIP: NEGATIVE
KETONES UR QL STRIP.AUTO: 20 MG/DL
LEUKOCYTE ESTERASE UR QL STRIP.AUTO: NEGATIVE
MCH RBC QN AUTO: 31.8 PG (ref 26–34)
MCHC RBC AUTO-ENTMCNC: 33.3 G/DL (ref 30–36.5)
MCV RBC AUTO: 95.3 FL (ref 80–99)
NITRITE UR QL STRIP.AUTO: NEGATIVE
NRBC # BLD: 0 K/UL (ref 0–0.01)
NRBC BLD-RTO: 0 PER 100 WBC
PH UR STRIP: 7 (ref 5–8)
PLATELET # BLD AUTO: 179 K/UL (ref 150–400)
PMV BLD AUTO: 12 FL (ref 8.9–12.9)
POTASSIUM SERPL-SCNC: 3.7 MMOL/L (ref 3.5–5.1)
PROT UR STRIP-MCNC: NEGATIVE MG/DL
RBC # BLD AUTO: 3.59 M/UL (ref 3.8–5.2)
RBC #/AREA URNS HPF: ABNORMAL /HPF (ref 0–5)
SODIUM SERPL-SCNC: 140 MMOL/L (ref 136–145)
SP GR UR REFRACTOMETRY: >1.03 (ref 1–1.03)
URINE CULTURE IF INDICATED: ABNORMAL
UROBILINOGEN UR QL STRIP.AUTO: 2 EU/DL (ref 0.1–1)
WBC # BLD AUTO: 9.6 K/UL (ref 3.6–11)
WBC URNS QL MICRO: ABNORMAL /HPF (ref 0–4)

## 2024-11-14 PROCEDURE — 97530 THERAPEUTIC ACTIVITIES: CPT

## 2024-11-14 PROCEDURE — 81001 URINALYSIS AUTO W/SCOPE: CPT

## 2024-11-14 PROCEDURE — 97161 PT EVAL LOW COMPLEX 20 MIN: CPT

## 2024-11-14 PROCEDURE — 80048 BASIC METABOLIC PNL TOTAL CA: CPT

## 2024-11-14 PROCEDURE — 36415 COLL VENOUS BLD VENIPUNCTURE: CPT

## 2024-11-14 PROCEDURE — 1100000000 HC RM PRIVATE

## 2024-11-14 PROCEDURE — 85027 COMPLETE CBC AUTOMATED: CPT

## 2024-11-14 PROCEDURE — 6370000000 HC RX 637 (ALT 250 FOR IP): Performed by: HOSPITALIST

## 2024-11-14 PROCEDURE — 97165 OT EVAL LOW COMPLEX 30 MIN: CPT

## 2024-11-14 PROCEDURE — 2580000003 HC RX 258: Performed by: HOSPITALIST

## 2024-11-14 PROCEDURE — 6360000002 HC RX W HCPCS: Performed by: HOSPITALIST

## 2024-11-14 RX ORDER — TIMOLOL MALEATE 5 MG/ML
1 SOLUTION/ DROPS OPHTHALMIC DAILY
Status: DISCONTINUED | OUTPATIENT
Start: 2024-11-14 | End: 2024-11-18 | Stop reason: HOSPADM

## 2024-11-14 RX ORDER — DORZOLAMIDE HCL 20 MG/ML
2 SOLUTION/ DROPS OPHTHALMIC DAILY
Status: DISCONTINUED | OUTPATIENT
Start: 2024-11-14 | End: 2024-11-18 | Stop reason: HOSPADM

## 2024-11-14 RX ORDER — ASCORBIC ACID 500 MG
500 TABLET ORAL DAILY
Status: DISCONTINUED | OUTPATIENT
Start: 2024-11-14 | End: 2024-11-18 | Stop reason: HOSPADM

## 2024-11-14 RX ORDER — CALCIUM CARBONATE 500 MG/1
500 TABLET, CHEWABLE ORAL DAILY
Status: DISCONTINUED | OUTPATIENT
Start: 2024-11-14 | End: 2024-11-18 | Stop reason: HOSPADM

## 2024-11-14 RX ORDER — LATANOPROST 50 UG/ML
1 SOLUTION/ DROPS OPHTHALMIC NIGHTLY
Status: DISCONTINUED | OUTPATIENT
Start: 2024-11-14 | End: 2024-11-18 | Stop reason: HOSPADM

## 2024-11-14 RX ORDER — LEVOTHYROXINE SODIUM 100 UG/1
100 TABLET ORAL DAILY
Status: DISCONTINUED | OUTPATIENT
Start: 2024-11-14 | End: 2024-11-15

## 2024-11-14 RX ORDER — ASPIRIN 325 MG
325 TABLET ORAL DAILY
Status: DISCONTINUED | OUTPATIENT
Start: 2024-11-14 | End: 2024-11-18 | Stop reason: HOSPADM

## 2024-11-14 RX ORDER — AMIODARONE HYDROCHLORIDE 200 MG/1
200 TABLET ORAL 2 TIMES DAILY
Status: DISCONTINUED | OUTPATIENT
Start: 2024-11-14 | End: 2024-11-15

## 2024-11-14 RX ADMIN — LEVOTHYROXINE SODIUM 100 MCG: 0.1 TABLET ORAL at 15:37

## 2024-11-14 RX ADMIN — AMIODARONE HYDROCHLORIDE 200 MG: 200 TABLET ORAL at 20:40

## 2024-11-14 RX ADMIN — OXYCODONE HYDROCHLORIDE AND ACETAMINOPHEN 500 MG: 500 TABLET ORAL at 15:37

## 2024-11-14 RX ADMIN — ENOXAPARIN SODIUM 30 MG: 100 INJECTION SUBCUTANEOUS at 08:36

## 2024-11-14 RX ADMIN — SODIUM CHLORIDE, PRESERVATIVE FREE 10 ML: 5 INJECTION INTRAVENOUS at 20:40

## 2024-11-14 RX ADMIN — ASPIRIN 325 MG: 325 TABLET ORAL at 15:37

## 2024-11-14 RX ADMIN — CALCIUM CARBONATE 500 MG: 500 TABLET, CHEWABLE ORAL at 15:37

## 2024-11-14 RX ADMIN — LATANOPROST 1 DROP: 50 SOLUTION OPHTHALMIC at 20:39

## 2024-11-14 RX ADMIN — TIMOLOL MALEATE 1 DROP: 5 SOLUTION OPHTHALMIC at 17:22

## 2024-11-14 ASSESSMENT — PAIN SCALES - GENERAL
PAINLEVEL_OUTOF10: 0
PAINLEVEL_OUTOF10: 0

## 2024-11-14 NOTE — CARE COORDINATION
1536: CM met with patient at bedside about therapy recs and provided choice list, patient requested daughters be called. CM spoke to daughter Tianna, she provided updated number for sister, Rajwinder, and asked that she be contacted. CM spoke to daughter, Rajwinder, she stated her and family will be visiting this evening and will reviewed choice list with patient for preference.      0812: CM reviewed chart. DCP home with daughter pending therapy recs when medicallly ready. CM will continue to follow.

## 2024-11-15 LAB
EKG ATRIAL RATE: 39 BPM
EKG DIAGNOSIS: NORMAL
EKG P-R INTERVAL: 194 MS
EKG Q-T INTERVAL: 516 MS
EKG QRS DURATION: 80 MS
EKG QTC CALCULATION (BAZETT): 415 MS
EKG R AXIS: -36 DEGREES
EKG T AXIS: -31 DEGREES
EKG VENTRICULAR RATE: 39 BPM

## 2024-11-15 PROCEDURE — 6360000002 HC RX W HCPCS: Performed by: HOSPITALIST

## 2024-11-15 PROCEDURE — 93005 ELECTROCARDIOGRAM TRACING: CPT | Performed by: INTERNAL MEDICINE

## 2024-11-15 PROCEDURE — 2580000003 HC RX 258: Performed by: HOSPITALIST

## 2024-11-15 PROCEDURE — 1100000000 HC RM PRIVATE

## 2024-11-15 PROCEDURE — 6370000000 HC RX 637 (ALT 250 FOR IP): Performed by: HOSPITALIST

## 2024-11-15 PROCEDURE — 97530 THERAPEUTIC ACTIVITIES: CPT

## 2024-11-15 RX ORDER — AMIODARONE HYDROCHLORIDE 200 MG/1
200 TABLET ORAL DAILY
Status: DISCONTINUED | OUTPATIENT
Start: 2024-11-16 | End: 2024-11-18 | Stop reason: HOSPADM

## 2024-11-15 RX ADMIN — LATANOPROST 1 DROP: 50 SOLUTION OPHTHALMIC at 20:44

## 2024-11-15 RX ADMIN — ASPIRIN 325 MG: 325 TABLET ORAL at 08:31

## 2024-11-15 RX ADMIN — ENOXAPARIN SODIUM 30 MG: 100 INJECTION SUBCUTANEOUS at 08:31

## 2024-11-15 RX ADMIN — TIMOLOL MALEATE 1 DROP: 5 SOLUTION OPHTHALMIC at 08:32

## 2024-11-15 RX ADMIN — CALCIUM CARBONATE 500 MG: 500 TABLET, CHEWABLE ORAL at 08:31

## 2024-11-15 RX ADMIN — AMIODARONE HYDROCHLORIDE 200 MG: 200 TABLET ORAL at 08:31

## 2024-11-15 RX ADMIN — SODIUM CHLORIDE, PRESERVATIVE FREE 10 ML: 5 INJECTION INTRAVENOUS at 21:58

## 2024-11-15 RX ADMIN — DORZOLAMIDE HYDROCHLORIDE 2 DROP: 20 SOLUTION OPHTHALMIC at 08:32

## 2024-11-15 RX ADMIN — SODIUM CHLORIDE, PRESERVATIVE FREE 10 ML: 5 INJECTION INTRAVENOUS at 08:32

## 2024-11-15 RX ADMIN — LEVOTHYROXINE SODIUM 100 MCG: 0.1 TABLET ORAL at 06:39

## 2024-11-15 RX ADMIN — OXYCODONE HYDROCHLORIDE AND ACETAMINOPHEN 500 MG: 500 TABLET ORAL at 08:32

## 2024-11-15 ASSESSMENT — PAIN SCALES - GENERAL
PAINLEVEL_OUTOF10: 0

## 2024-11-15 NOTE — CARE COORDINATION
CM reviewed chart. Per previous case manger, choice list left for family to review for SNF. Call made to family to discuss choices for SNF. Call made to daughter Ms. Aponte, no answer, message left for return call.

## 2024-11-15 NOTE — CARE COORDINATION
Met with patient daughters at bedside to discuss discharge planning. Reviewed list of SNF. Choice given for 1. [facility], 2. [facility], 3. [facility]. [facility] will require insurance approval.

## 2024-11-15 NOTE — CONSULTS
Cardiology Consult    NAME: Julee Mcclellan   :  1931   MRN:  [id number]     Date/Time:  11/15/2024 1:48 PM    Patient PCP: Amanda Velasquez MD  ________________________________________________________________________     Assessment/Plan:   Bradycardia, heart rate had dropped to the 30s, telemetry strips and EKG reviewed.  No blocks.  No pauses.  No pacemaker warranted at the present time.  Continue telemetry while in hospital.    Hypothyroidism, on replacement, 2024 TSH is 5.3    Altered mental status?  Back to baseline.    Paroxysmal A-fib, maintaining sinus rhythm, continue amiodarone, will decrease dose to 200 mg daily.    Hypertension, blood pressure downtrending.  May need to add amlodipine if blood pressure stays high.    History of syncope    Hypercholesterolemia    History of breast cancer           []        High complexity decision making was performed        Subjective:   CHIEF COMPLAINT:   Altered mental status    REASON FOR CONSULT:  Bradycardia    HISTORY OF PRESENT ILLNESS:     Julee Mcclellan is a 89 y.o. Black /  female who usually sees me in the office with history of paroxysmal A-fib, maintaining sinus rhythm on amiodarone.  Will decrease dose of amiodarone.  TSH on this admission is normal.  Normal liver functions.  Patient was admitted with altered mental status, no evidence of UTI.?  Baseline now.  Patient reports she is worried about her children.  Says she does live with her daughter.      Past Medical History:   Diagnosis Date    Benign essential hypertension     Breast cancer (HCC)     left side-    Breast cancer (HCC) 02/15/2023    left side-       Closed nondisplaced oblique fracture of shaft of right fibula with routine healing, subsequent encounter     Constipation in female     Elevated glucose     Elevated serum creatinine     Hypercholesteremia     Hyperthyroidism     Hypothyroidism, adult     Mild anemia     Sciatica of right side

## 2024-11-16 PROBLEM — R00.1 BRADYCARDIA: Status: ACTIVE | Noted: 2024-11-16

## 2024-11-16 PROBLEM — N39.0 UTI (URINARY TRACT INFECTION): Status: ACTIVE | Noted: 2024-11-16

## 2024-11-16 LAB
ANION GAP SERPL CALC-SCNC: 4 MMOL/L (ref 2–12)
BUN SERPL-MCNC: 20 MG/DL (ref 6–20)
BUN/CREAT SERPL: 13 (ref 12–20)
CA-I BLD-MCNC: 8.8 MG/DL (ref 8.5–10.1)
CHLORIDE SERPL-SCNC: 109 MMOL/L (ref 97–108)
CO2 SERPL-SCNC: 26 MMOL/L (ref 21–32)
CREAT SERPL-MCNC: 1.6 MG/DL (ref 0.55–1.02)
ERYTHROCYTE [DISTWIDTH] IN BLOOD BY AUTOMATED COUNT: 13.3 % (ref 11.5–14.5)
GLUCOSE SERPL-MCNC: 82 MG/DL (ref 65–100)
HCT VFR BLD AUTO: 36.6 % (ref 35–47)
HGB BLD-MCNC: 11.9 G/DL (ref 11.5–16)
MCH RBC QN AUTO: 31.2 PG (ref 26–34)
MCHC RBC AUTO-ENTMCNC: 32.5 G/DL (ref 30–36.5)
MCV RBC AUTO: 95.8 FL (ref 80–99)
NRBC # BLD: 0 K/UL (ref 0–0.01)
NRBC BLD-RTO: 0 PER 100 WBC
PLATELET # BLD AUTO: 204 K/UL (ref 150–400)
PMV BLD AUTO: 12.1 FL (ref 8.9–12.9)
POTASSIUM SERPL-SCNC: 3.6 MMOL/L (ref 3.5–5.1)
RBC # BLD AUTO: 3.82 M/UL (ref 3.8–5.2)
SODIUM SERPL-SCNC: 139 MMOL/L (ref 136–145)
WBC # BLD AUTO: 8.2 K/UL (ref 3.6–11)

## 2024-11-16 PROCEDURE — 80048 BASIC METABOLIC PNL TOTAL CA: CPT

## 2024-11-16 PROCEDURE — 6370000000 HC RX 637 (ALT 250 FOR IP): Performed by: INTERNAL MEDICINE

## 2024-11-16 PROCEDURE — 6370000000 HC RX 637 (ALT 250 FOR IP): Performed by: HOSPITALIST

## 2024-11-16 PROCEDURE — 2580000003 HC RX 258: Performed by: HOSPITALIST

## 2024-11-16 PROCEDURE — 1100000000 HC RM PRIVATE

## 2024-11-16 PROCEDURE — 85027 COMPLETE CBC AUTOMATED: CPT

## 2024-11-16 PROCEDURE — 36415 COLL VENOUS BLD VENIPUNCTURE: CPT

## 2024-11-16 PROCEDURE — 6360000002 HC RX W HCPCS: Performed by: HOSPITALIST

## 2024-11-16 PROCEDURE — 94761 N-INVAS EAR/PLS OXIMETRY MLT: CPT

## 2024-11-16 PROCEDURE — 6370000000 HC RX 637 (ALT 250 FOR IP): Performed by: STUDENT IN AN ORGANIZED HEALTH CARE EDUCATION/TRAINING PROGRAM

## 2024-11-16 RX ORDER — AMIODARONE HYDROCHLORIDE 200 MG/1
200 TABLET ORAL DAILY
Qty: 90 TABLET | Refills: 0 | Status: SHIPPED | OUTPATIENT
Start: 2024-11-16 | End: 2025-02-14

## 2024-11-16 RX ORDER — LEVOTHYROXINE SODIUM 100 UG/1
150 TABLET ORAL DAILY
Qty: 30 TABLET | Refills: 3 | Status: SHIPPED | OUTPATIENT
Start: 2024-11-17

## 2024-11-16 RX ORDER — AMLODIPINE BESYLATE 5 MG/1
5 TABLET ORAL DAILY
Status: DISCONTINUED | OUTPATIENT
Start: 2024-11-16 | End: 2024-11-18 | Stop reason: HOSPADM

## 2024-11-16 RX ORDER — AMLODIPINE BESYLATE 5 MG/1
5 TABLET ORAL DAILY
Qty: 90 EACH | Refills: 1 | Status: SHIPPED | OUTPATIENT
Start: 2024-11-16

## 2024-11-16 RX ADMIN — LATANOPROST 1 DROP: 50 SOLUTION OPHTHALMIC at 20:12

## 2024-11-16 RX ADMIN — AMLODIPINE BESYLATE 5 MG: 5 TABLET ORAL at 09:09

## 2024-11-16 RX ADMIN — LEVOTHYROXINE SODIUM 150 MCG: 0.03 TABLET ORAL at 06:27

## 2024-11-16 RX ADMIN — OXYCODONE HYDROCHLORIDE AND ACETAMINOPHEN 500 MG: 500 TABLET ORAL at 09:08

## 2024-11-16 RX ADMIN — SODIUM CHLORIDE, PRESERVATIVE FREE 10 ML: 5 INJECTION INTRAVENOUS at 20:12

## 2024-11-16 RX ADMIN — SODIUM CHLORIDE, PRESERVATIVE FREE 10 ML: 5 INJECTION INTRAVENOUS at 09:08

## 2024-11-16 RX ADMIN — TIMOLOL MALEATE 1 DROP: 5 SOLUTION OPHTHALMIC at 09:08

## 2024-11-16 RX ADMIN — AMIODARONE HYDROCHLORIDE 200 MG: 200 TABLET ORAL at 09:08

## 2024-11-16 RX ADMIN — CALCIUM CARBONATE 500 MG: 500 TABLET, CHEWABLE ORAL at 09:08

## 2024-11-16 RX ADMIN — DORZOLAMIDE HYDROCHLORIDE 2 DROP: 20 SOLUTION OPHTHALMIC at 09:08

## 2024-11-16 RX ADMIN — ENOXAPARIN SODIUM 30 MG: 100 INJECTION SUBCUTANEOUS at 09:09

## 2024-11-16 RX ADMIN — ASPIRIN 325 MG: 325 TABLET ORAL at 09:08

## 2024-11-16 ASSESSMENT — PAIN SCALES - GENERAL
PAINLEVEL_OUTOF10: 0

## 2024-11-17 LAB
ANION GAP SERPL CALC-SCNC: 8 MMOL/L (ref 2–12)
BUN SERPL-MCNC: 17 MG/DL (ref 6–20)
BUN/CREAT SERPL: 13 (ref 12–20)
CA-I BLD-MCNC: 8.2 MG/DL (ref 8.5–10.1)
CHLORIDE SERPL-SCNC: 108 MMOL/L (ref 97–108)
CO2 SERPL-SCNC: 24 MMOL/L (ref 21–32)
CREAT SERPL-MCNC: 1.36 MG/DL (ref 0.55–1.02)
ERYTHROCYTE [DISTWIDTH] IN BLOOD BY AUTOMATED COUNT: 13.2 % (ref 11.5–14.5)
GLUCOSE SERPL-MCNC: 93 MG/DL (ref 65–100)
HCT VFR BLD AUTO: 38.3 % (ref 35–47)
HGB BLD-MCNC: 12.6 G/DL (ref 11.5–16)
MCH RBC QN AUTO: 31.2 PG (ref 26–34)
MCHC RBC AUTO-ENTMCNC: 32.9 G/DL (ref 30–36.5)
MCV RBC AUTO: 94.8 FL (ref 80–99)
NRBC # BLD: 0 K/UL (ref 0–0.01)
NRBC BLD-RTO: 0 PER 100 WBC
PLATELET # BLD AUTO: 208 K/UL (ref 150–400)
PMV BLD AUTO: 12.1 FL (ref 8.9–12.9)
POTASSIUM SERPL-SCNC: 3.4 MMOL/L (ref 3.5–5.1)
RBC # BLD AUTO: 4.04 M/UL (ref 3.8–5.2)
SODIUM SERPL-SCNC: 140 MMOL/L (ref 136–145)
WBC # BLD AUTO: 8.8 K/UL (ref 3.6–11)

## 2024-11-17 PROCEDURE — 1100000000 HC RM PRIVATE

## 2024-11-17 PROCEDURE — 6360000002 HC RX W HCPCS: Performed by: HOSPITALIST

## 2024-11-17 PROCEDURE — 6370000000 HC RX 637 (ALT 250 FOR IP): Performed by: HOSPITALIST

## 2024-11-17 PROCEDURE — 80048 BASIC METABOLIC PNL TOTAL CA: CPT

## 2024-11-17 PROCEDURE — 6370000000 HC RX 637 (ALT 250 FOR IP): Performed by: INTERNAL MEDICINE

## 2024-11-17 PROCEDURE — 6370000000 HC RX 637 (ALT 250 FOR IP): Performed by: STUDENT IN AN ORGANIZED HEALTH CARE EDUCATION/TRAINING PROGRAM

## 2024-11-17 PROCEDURE — 2580000003 HC RX 258: Performed by: HOSPITALIST

## 2024-11-17 PROCEDURE — 36415 COLL VENOUS BLD VENIPUNCTURE: CPT

## 2024-11-17 PROCEDURE — 85027 COMPLETE CBC AUTOMATED: CPT

## 2024-11-17 RX ADMIN — DORZOLAMIDE HYDROCHLORIDE 2 DROP: 20 SOLUTION OPHTHALMIC at 10:35

## 2024-11-17 RX ADMIN — TIMOLOL MALEATE 1 DROP: 5 SOLUTION OPHTHALMIC at 10:37

## 2024-11-17 RX ADMIN — LEVOTHYROXINE SODIUM 150 MCG: 0.03 TABLET ORAL at 06:17

## 2024-11-17 RX ADMIN — LATANOPROST 1 DROP: 50 SOLUTION OPHTHALMIC at 20:45

## 2024-11-17 RX ADMIN — POTASSIUM BICARBONATE 40 MEQ: 782 TABLET, EFFERVESCENT ORAL at 10:34

## 2024-11-17 RX ADMIN — CALCIUM CARBONATE 500 MG: 500 TABLET, CHEWABLE ORAL at 10:36

## 2024-11-17 RX ADMIN — AMIODARONE HYDROCHLORIDE 200 MG: 200 TABLET ORAL at 10:37

## 2024-11-17 RX ADMIN — AMLODIPINE BESYLATE 5 MG: 5 TABLET ORAL at 10:36

## 2024-11-17 RX ADMIN — OXYCODONE HYDROCHLORIDE AND ACETAMINOPHEN 500 MG: 500 TABLET ORAL at 10:37

## 2024-11-17 RX ADMIN — ENOXAPARIN SODIUM 30 MG: 100 INJECTION SUBCUTANEOUS at 10:53

## 2024-11-17 RX ADMIN — ASPIRIN 325 MG: 325 TABLET ORAL at 10:36

## 2024-11-17 RX ADMIN — SODIUM CHLORIDE, PRESERVATIVE FREE 10 ML: 5 INJECTION INTRAVENOUS at 10:37

## 2024-11-17 NOTE — CARE COORDINATION
DC order noted.  Chart reviewed.  The pt is accepted for care by [facility] for [facility] needs.  She is expected to admit to them on Monday 11/18.    Demographic information submitted, as requested, to that facility with updates via [facility]

## 2024-11-18 VITALS
DIASTOLIC BLOOD PRESSURE: 63 MMHG | HEIGHT: 66 IN | OXYGEN SATURATION: 97 % | BODY MASS INDEX: 18.71 KG/M2 | WEIGHT: 116.4 LBS | TEMPERATURE: 97.7 F | HEART RATE: 56 BPM | RESPIRATION RATE: 17 BRPM | SYSTOLIC BLOOD PRESSURE: 120 MMHG

## 2024-11-18 LAB
ANION GAP SERPL CALC-SCNC: 4 MMOL/L (ref 2–12)
BUN SERPL-MCNC: 16 MG/DL (ref 6–20)
BUN/CREAT SERPL: 12 (ref 12–20)
CA-I BLD-MCNC: 8.6 MG/DL (ref 8.5–10.1)
CHLORIDE SERPL-SCNC: 108 MMOL/L (ref 97–108)
CO2 SERPL-SCNC: 26 MMOL/L (ref 21–32)
CREAT SERPL-MCNC: 1.29 MG/DL (ref 0.55–1.02)
ERYTHROCYTE [DISTWIDTH] IN BLOOD BY AUTOMATED COUNT: 13.1 % (ref 11.5–14.5)
GLUCOSE SERPL-MCNC: 99 MG/DL (ref 65–100)
HCT VFR BLD AUTO: 39 % (ref 35–47)
HGB BLD-MCNC: 13.1 G/DL (ref 11.5–16)
MCH RBC QN AUTO: 31.3 PG (ref 26–34)
MCHC RBC AUTO-ENTMCNC: 33.6 G/DL (ref 30–36.5)
MCV RBC AUTO: 93.3 FL (ref 80–99)
NRBC # BLD: 0 K/UL (ref 0–0.01)
NRBC BLD-RTO: 0 PER 100 WBC
PLATELET # BLD AUTO: 226 K/UL (ref 150–400)
PMV BLD AUTO: 11.5 FL (ref 8.9–12.9)
POTASSIUM SERPL-SCNC: 3.8 MMOL/L (ref 3.5–5.1)
RBC # BLD AUTO: 4.18 M/UL (ref 3.8–5.2)
SARS-COV-2 RNA RESP QL NAA+PROBE: NOT DETECTED
SODIUM SERPL-SCNC: 138 MMOL/L (ref 136–145)
SPECIMEN SOURCE: NORMAL
WBC # BLD AUTO: 9.5 K/UL (ref 3.6–11)

## 2024-11-18 PROCEDURE — 6370000000 HC RX 637 (ALT 250 FOR IP): Performed by: STUDENT IN AN ORGANIZED HEALTH CARE EDUCATION/TRAINING PROGRAM

## 2024-11-18 PROCEDURE — 6370000000 HC RX 637 (ALT 250 FOR IP): Performed by: INTERNAL MEDICINE

## 2024-11-18 PROCEDURE — 85027 COMPLETE CBC AUTOMATED: CPT

## 2024-11-18 PROCEDURE — 6370000000 HC RX 637 (ALT 250 FOR IP): Performed by: HOSPITALIST

## 2024-11-18 PROCEDURE — 36415 COLL VENOUS BLD VENIPUNCTURE: CPT

## 2024-11-18 PROCEDURE — 80048 BASIC METABOLIC PNL TOTAL CA: CPT

## 2024-11-18 PROCEDURE — 6360000002 HC RX W HCPCS: Performed by: HOSPITALIST

## 2024-11-18 PROCEDURE — 87635 SARS-COV-2 COVID-19 AMP PRB: CPT

## 2024-11-18 RX ADMIN — CALCIUM CARBONATE 500 MG: 500 TABLET, CHEWABLE ORAL at 09:21

## 2024-11-18 RX ADMIN — LEVOTHYROXINE SODIUM 150 MCG: 0.03 TABLET ORAL at 05:32

## 2024-11-18 RX ADMIN — ENOXAPARIN SODIUM 30 MG: 100 INJECTION SUBCUTANEOUS at 09:22

## 2024-11-18 RX ADMIN — AMLODIPINE BESYLATE 5 MG: 5 TABLET ORAL at 09:22

## 2024-11-18 RX ADMIN — DORZOLAMIDE HYDROCHLORIDE 2 DROP: 20 SOLUTION OPHTHALMIC at 09:22

## 2024-11-18 RX ADMIN — AMIODARONE HYDROCHLORIDE 200 MG: 200 TABLET ORAL at 09:21

## 2024-11-18 RX ADMIN — OXYCODONE HYDROCHLORIDE AND ACETAMINOPHEN 500 MG: 500 TABLET ORAL at 09:21

## 2024-11-18 RX ADMIN — TIMOLOL MALEATE 1 DROP: 5 SOLUTION OPHTHALMIC at 09:22

## 2024-11-18 RX ADMIN — ASPIRIN 325 MG: 325 TABLET ORAL at 09:22

## 2024-11-18 ASSESSMENT — PAIN SCALES - GENERAL: PAINLEVEL_OUTOF10: 0

## 2024-11-18 NOTE — DISCHARGE SUMMARY
comfortably with no acute complaints at this time.  Rounded with RN, patient has not had any acute complaints, patient continues to be monitored for bradycardia.  Patient's blood pressure is mildly elevated today, recheck is 120/63.  Continue current management.   Pertinent Findings:  Patient Vitals for the past 24 hrs:   BP Temp Temp src Pulse Resp SpO2   11/18/24 1100 120/63 -- -- -- -- --   11/18/24 0830 (!) 187/71 97.7 °F (36.5 °C) Axillary 56 17 97 %   11/18/24 0706 -- -- -- 59 -- --   11/18/24 0315 (!) 164/70 97.9 °F (36.6 °C) Axillary 56 16 96 %   11/18/24 0032 -- -- -- 55 -- --   11/17/24 2030 (!) 157/65 99.2 °F (37.3 °C) Axillary 57 18 97 %   11/17/24 1508 (!) 156/59 97.5 °F (36.4 °C) Oral (!) 49 16 98 %   11/17/24 1500 -- -- -- (!) 49 -- --       Gen:    Not in distress  Chest: Clear lungs  CVS:   Regular rhythm.  No edema  Abd:  Soft, not distended, not tender  Neuro: Alert and oriented x3    Discharge/Recent Laboratory Results:  Recent Labs     11/18/24  0707      K 3.8      CO2 26   BUN 16   CREATININE 1.29*   GLUCOSE 99   CALCIUM 8.6     Recent Labs     11/18/24  0707   HGB 13.1   HCT 39.0   WBC 9.5          Discharge Medications:     Medication List        CHANGE how you take these medications      amiodarone 200 MG tablet  Commonly known as: CORDARONE  Take 1 tablet by mouth daily  What changed: when to take this     levothyroxine 100 MCG tablet  Commonly known as: SYNTHROID  Take 1.5 tablets by mouth Daily  What changed: See the new instructions.            CONTINUE taking these medications      acetaminophen 500 MG tablet  Commonly known as: TYLENOL     amLODIPine 5 MG tablet  Commonly known as: NORVASC  Take 1 tablet by mouth daily     ascorbic acid 500 MG tablet  Commonly known as: VITAMIN C     buffered aspirin 325 MG Tabs  Take 1 tablet by mouth daily     calcium carbonate 500 MG Tabs tablet  Commonly known as: OSCAL     dorzolamide 2 % ophthalmic solution  Commonly known as:

## 2024-11-18 NOTE — CARE COORDINATION
Patient has been accepted at all 3 facilities. Call made to daughter to see where she would like mother to go to as final choice. Message left for return call. Patient has been cleared for discharge by medical team.     1110: spoke with daughter via phone about discharge and accepting facilities. Daughter final choice is [facility]. Waiting for covid results for bed assignment.

## 2024-11-18 NOTE — CARE COORDINATION
Patient discharging today, going to [facility], room 407. Daughter ray aware and in agreement with discharge. [facility]     Nurse to call report at [phone]    Has met 3 midnight stay.    Transition of Care Plan:    RUR: 13%  Prior Level of Functioning: ind  Disposition: SNF  KHOA: today  If SNF or IPR: Date FOC offered: 11/15  Date FOC received: 11/15  Accepting facility: [facility]  Date authorization started with reference number: na  Date authorization received and expires: na  Follow up appointments:   DME needed: na  Transportation at discharge: [facility]  [facility] Medicare/ letter given: 11/18  Is patient a  and connected with [facility]? na   If yes, was [facility] transfer form completed and [facility] notified? na  Caregiver Contact: daughter  Discharge Caregiver contacted prior to discharge? daughter  Care Conference needed? na  Barriers to discharge: na

## 2024-11-18 NOTE — PLAN OF CARE
Problem: Discharge Planning  Goal: Discharge to home or other facility with appropriate resources  11/14/2024 2210 by Madison Smith RN  Outcome: Progressing  11/14/2024 0956 by Brooke Wayne RN  Outcome: Progressing     Problem: Pain  Goal: Verbalizes/displays adequate comfort level or baseline comfort level  11/14/2024 2210 by Madison Smith RN  Outcome: Progressing  11/14/2024 0956 by Brooke Wayne RN  Outcome: Progressing     Problem: ABCDS Injury Assessment  Goal: Absence of physical injury  11/14/2024 2210 by Madison Smith RN  Outcome: Progressing  Flowsheets (Taken 11/14/2024 1921)  Absence of Physical Injury: Implement safety measures based on patient assessment  11/14/2024 0956 by Brooke Wayne RN  Outcome: Progressing     Problem: Safety - Adult  Goal: Free from fall injury  11/14/2024 2210 by Madison Smith RN  Outcome: Progressing  Flowsheets (Taken 11/14/2024 1921)  Free From Fall Injury: Instruct family/caregiver on patient safety  11/14/2024 0956 by Brooke Wayne RN  Outcome: Progressing     Problem: Physical Therapy - Adult  Goal: By Discharge: Performs mobility at highest level of function for planned discharge setting.  See evaluation for individualized goals.  Description: FUNCTIONAL STATUS PRIOR TO ADMISSION: Patient was modified independent using a rollator and single point cane for functional mobility., The patient  was modified independent using adaptive equipment for basic and instrumental ADLs., and The patient and/or family endorse 0 falls within the last 3 months.    HOME SUPPORT PRIOR TO ADMISSION: The patient lived with daughter who was able to assist pt if needed.    Physical Therapy Goals  Initiated 11/14/2024  Pt stated goal: to go home  Pt will be I with [facility] in 7 days.  Pt will perform bed mobility with Modified [place] in 7 days.  Pt will perform transfers with Modified [place] in 7 days.   Pt will amb 25-50 feet with LRAD safely with Modified 
  Problem: Discharge Planning  Goal: Discharge to home or other facility with appropriate resources  11/15/2024 2344 by Marti Nguyen RN  Outcome: Progressing  Flowsheets (Taken 11/15/2024 1945)  Discharge to home or other facility with appropriate resources:   Identify barriers to discharge with patient and caregiver   Arrange for needed discharge resources and transportation as appropriate   Identify discharge learning needs (meds, wound care, etc)   Refer to discharge planning if patient needs post-hospital services based on physician order or complex needs related to functional status, cognitive ability or social support system  11/15/2024 1044 by Brooke Wayne RN  Outcome: Progressing     Problem: Pain  Goal: Verbalizes/displays adequate comfort level or baseline comfort level  11/15/2024 2344 by Marti Nguyen RN  Outcome: Progressing  Flowsheets (Taken 11/15/2024 1915)  Verbalizes/displays adequate comfort level or baseline comfort level:   Encourage patient to monitor pain and request assistance   Assess pain using appropriate pain scale   Administer analgesics based on type and severity of pain and evaluate response   Implement non-pharmacological measures as appropriate and evaluate response   Consider cultural and social influences on pain and pain management   Notify Licensed Independent Practitioner if interventions unsuccessful or patient reports new pain  11/15/2024 1044 by Brooke Wayne RN  Outcome: Progressing     Problem: ABCDS Injury Assessment  Goal: Absence of physical injury  11/15/2024 2344 by Marti Nguyen RN  Outcome: Progressing  11/15/2024 1044 by Brooke Wayne RN  Outcome: Progressing     Problem: Safety - Adult  Goal: Free from fall injury  11/15/2024 2344 by Marti Nguyen RN  Outcome: Progressing  Flowsheets (Taken 11/15/2024 1945)  Free From Fall Injury:   Instruct family/caregiver on patient safety   Based on caregiver fall risk screen, instruct family/caregiver to 
  Problem: Discharge Planning  Goal: Discharge to home or other facility with appropriate resources  11/16/2024 1220 by Carmela Macedo RN  Outcome: Progressing  11/15/2024 2344 by Marti Nguyen RN  Outcome: Progressing  Flowsheets (Taken 11/15/2024 1945)  Discharge to home or other facility with appropriate resources:   Identify barriers to discharge with patient and caregiver   Arrange for needed discharge resources and transportation as appropriate   Identify discharge learning needs (meds, wound care, etc)   Refer to discharge planning if patient needs post-hospital services based on physician order or complex needs related to functional status, cognitive ability or social support system     Problem: Pain  Goal: Verbalizes/displays adequate comfort level or baseline comfort level  11/16/2024 1220 by Carmela Macedo RN  Outcome: Progressing  11/15/2024 2344 by Marti Nguyen RN  Outcome: Progressing  Flowsheets (Taken 11/15/2024 1915)  Verbalizes/displays adequate comfort level or baseline comfort level:   Encourage patient to monitor pain and request assistance   Assess pain using appropriate pain scale   Administer analgesics based on type and severity of pain and evaluate response   Implement non-pharmacological measures as appropriate and evaluate response   Consider cultural and social influences on pain and pain management   Notify Licensed Independent Practitioner if interventions unsuccessful or patient reports new pain     Problem: ABCDS Injury Assessment  Goal: Absence of physical injury  11/16/2024 1220 by Carmela Macedo RN  Outcome: Progressing  11/15/2024 2344 by Marti Nguyen RN  Outcome: Progressing     Problem: Safety - Adult  Goal: Free from fall injury  11/16/2024 1220 by Carmela Macedo RN  Outcome: Progressing  11/15/2024 2344 by Marti Nguyen RN  Outcome: Progressing  Flowsheets (Taken 11/15/2024 1945)  Free From Fall Injury:   Instruct family/caregiver on patient safety   Based 
  Problem: Discharge Planning  Goal: Discharge to home or other facility with appropriate resources  11/16/2024 2219 by Marti Nguyen RN  Outcome: Progressing  11/16/2024 1220 by Carmela Macedo RN  Outcome: Progressing     Problem: Pain  Goal: Verbalizes/displays adequate comfort level or baseline comfort level  11/16/2024 2219 by Marti Nguyen RN  Outcome: Progressing  11/16/2024 1220 by Carmela Macedo RN  Outcome: Progressing     Problem: ABCDS Injury Assessment  Goal: Absence of physical injury  11/16/2024 2219 by Marti Nguyen RN  Outcome: Progressing  11/16/2024 1220 by Carmela Macedo RN  Outcome: Progressing     Problem: Safety - Adult  Goal: Free from fall injury  11/16/2024 2219 by Marti Nguyen RN  Outcome: Progressing  Flowsheets (Taken 11/16/2024 2028)  Free From Fall Injury:   Instruct family/caregiver on patient safety   Based on caregiver fall risk screen, instruct family/caregiver to ask for assistance with transferring infant if caregiver noted to have fall risk factors  11/16/2024 1220 by Carmela Macedo RN  Outcome: Progressing     Problem: Skin/Tissue Integrity  Goal: Absence of new skin breakdown  Description: 1.  Monitor for areas of redness and/or skin breakdown  2.  Assess vascular access sites hourly  3.  Every 4-6 hours minimum:  Change oxygen saturation probe site  4.  Every 4-6 hours:  If on nasal continuous positive airway pressure, respiratory therapy assess nares and determine need for appliance change or resting period.  11/16/2024 2219 by Marti Nguyen RN  Outcome: Progressing  11/16/2024 1220 by Carmela Macedo RN  Outcome: Progressing     
  Problem: Discharge Planning  Goal: Discharge to home or other facility with appropriate resources  Outcome: Progressing     Problem: Pain  Goal: Verbalizes/displays adequate comfort level or baseline comfort level  Outcome: Progressing     Problem: ABCDS Injury Assessment  Goal: Absence of physical injury  Outcome: Progressing  Flowsheets (Taken 11/13/2024 1952)  Absence of Physical Injury: Implement safety measures based on patient assessment     Problem: Safety - Adult  Goal: Free from fall injury  Outcome: Progressing  Flowsheets (Taken 11/13/2024 1952)  Free From Fall Injury:   Instruct family/caregiver on patient safety   Based on caregiver fall risk screen, instruct family/caregiver to ask for assistance with transferring infant if caregiver noted to have fall risk factors     
  Problem: Discharge Planning  Goal: Discharge to home or other facility with appropriate resources  Outcome: Progressing  Flowsheets (Taken 11/17/2024 7267)  Discharge to home or other facility with appropriate resources: Identify barriers to discharge with patient and caregiver     Problem: Pain  Goal: Verbalizes/displays adequate comfort level or baseline comfort level  Outcome: Progressing     Problem: ABCDS Injury Assessment  Goal: Absence of physical injury  Outcome: Progressing     Problem: Safety - Adult  Goal: Free from fall injury  Outcome: Progressing     Problem: Skin/Tissue Integrity  Goal: Absence of new skin breakdown  Description: 1.  Monitor for areas of redness and/or skin breakdown  2.  Assess vascular access sites hourly  3.  Every 4-6 hours minimum:  Change oxygen saturation probe site  4.  Every 4-6 hours:  If on nasal continuous positive airway pressure, respiratory therapy assess nares and determine need for appliance change or resting period.  Outcome: Progressing     
OCCUPATIONAL THERAPY EVALUATION  Patient: Julee Mcclellan (89 y.o. female)  Date: 11/14/2024  Primary Diagnosis: Metabolic encephalopathy [G93.41]  Meningioma (HCC) [D32.9]  Altered mental status, unspecified altered mental status type [R41.82]       Precautions: Fall Risk, Bed Alarm                Recommendations for nursing mobility: Out of bed to chair for meals, Encourage HEP in prep for ADLs/mobility; see handout for details, Use of bed/chair alarm for safety, AD and gt belt for bed to chair , Amb to bathroom with AD and gait belt, and Assist x1    In place during session:Peripheral IV and EKG/telemetry   ASSESSMENT  Pt is a 89 y.o. female presenting to [facility] with c/o confusion and lethargy, admitted on 11/13/2024 and currently being treated for UTI, metabolic encephalopathy, TEJAS, HTN, hypothyroidism, DVT prophylaxis. Pt received semi-supine in bed upon arrival, AXO x4, and agreeable to OT evaluation.     Based on current observations, pt presents with decreased  functional mobility, independence in ADLs, high-level IADLs, strength, activity tolerance, endurance, safety awareness, cognition, attention/concentration, balance (see below for objective details and assist levels).     Overall, pt tolerates session good with 0/10 reported pain. Pt found sitting up at EOB finishing eating breakfast. Pt completing face washing at EOB with set up A and sock management at EOB with supervision and increased time. Pt transferring into standing with CGA/min A. Pt ambulating household distance from EOB to restroom, requiring safety cues intermittently t/o mobility and min/CGA + safety cues for transfer onto commode. Pt completing overall toileting with supervision. Pt then completing standing hand hygiene at sink with CGA, and then returning to bed. Pt requiring min A to transfer self into semi supine position. Pt left in no acute distress with all need met.  Pt will benefit from continued skilled OT services to address 
Hellen, Carmela, RN  Outcome: Progressing     
and instrumental ADLs., and The patient and/or family endorse 0 falls within the last 3 months.    HOME SUPPORT PRIOR TO ADMISSION: The patient lived with daughter who was able to assist pt if needed.    Physical Therapy Goals  Initiated 11/14/2024  Pt stated goal: to go home  Pt will be I with [facility] in 7 days.  Pt will perform bed mobility with Modified [place] in 7 days.  Pt will perform transfers with Modified [place] in 7 days.   Pt will amb 25-50 feet with LRAD safely with Modified [place] in 7 days.  If DC home: Pt will ascend/descend 6 steps with bilateral handrail(s) and Modified [place] in 7 days to safely enter/navigate home.     Outcome: Progressing     PLAN :  Patient continues to benefit from skilled intervention to address functional impairments. Continue treatment per established plan of care to address goals.    Recommendation for discharge: (in order for the patient to meet his/her long term goals)  Moderate intensity short-term skilled physical therapy up to 5x/week    Potential barriers for safe discharge: pt has poor safety awareness, pt has impaired cognition, pt is a high fall risk, pt is not safe to be alone, and concern for pt safely navigating or managing the home environment.    IF patient discharges home will need the following DME:continuing to assess with progress     SUBJECTIVE:   Patient stated “We can try.”    OBJECTIVE DATA SUMMARY:   Critical Behavior:  Orientation  Overall Orientation Status: Within Normal Limits  Orientation Level: Oriented X4  Cognition  Arousal/Alertness: Appears intact  Following Commands: Appears intact  Attention Span: Attends with cues to redirect  Initiation: Requires cues for some  Sequencing: Requires cues for some  Functional Mobility Training:  Bed Mobility:  Bed Mobility Training  Bed Mobility Training: Yes  Rolling: Minimum assistance;Assist X1  Supine to Sit: Minimum assistance;Moderate assistance;Assist X1  Scooting: Minimum

## 2024-11-18 NOTE — PROGRESS NOTES
Hospitalist Progress Note    NAME:   Julee Mcclellan   : 1931   MRN: [id number]     Date/Time: 2024 2:23 PM  Patient PCP: Amanda Velasquez MD    Estimated discharge date:  Barriers:       Assessment / Plan:    Possible UTI  Initiated on ceftriaxone.  Urinalysis and urine culture negative.  Discontinue Rocephin.     Metabolic encephalopathy  Likely secondary to TEJAS at this point.    Urinalysis unremarkable and urine culture not indicated.  Head CT is unremarkable.     TEJAS on CKD stage III  Most likely due to poor oral intake  Continue IV fluid resuscitation, avoid nephrotoxins     Hypertension  Blood pressure control suboptimal  Initiated on amlodipine     Hypothyroidism  Continue levothyroxine     DVT prophylaxis  Lovenox      Medical Decision Making:   I personally reviewed labs:  I personally reviewed imaging:  I personally reviewed EKG:  Toxic drug monitoring:   Discussed case with: Patient, nursing    Subjective:     Chief Complaint / Reason for Physician Visit  \" Weakness\".  Discussed with RN events overnight.       ---   Admission H&P  89 y.o. female presents with confusion with lethargy.  She was in her usual state of health until a couple of days ago when her daughters noticed her to have become more lethargic, confused on inquiry in her speech for which they brought her to the ER.  Patient had been complaining of lower abdominal pain.  Family also reports patient has not had urinary frequency but has been drinking and of water too.  She reports having an episode of dysuria during her stay in the ER.  She denies fever, chills, nausea, vomiting, headache, palpitations or lightheadedness.  Her lab work is significant for CR 2.02.  UA is unremarkable.  She is afebrile with mild leukocytosis.        Patient is laying comfortably in bed and currently alert and oriented x 3.  She denies any pain, dysuria, abdominal pain, nausea/vomiting or overnight fever/chills.    Counseled on 
    Hospitalist Progress Note    NAME:   Julee Mcclellan   : 1931   MRN: [id number]     Date/Time: 2024 11:25 AM  Patient PCP: Amanda Velasquez MD    Estimated discharge date: 24 hours  Barriers: SNF placement      HOSPITAL COURSE:  Julee Mcclellan is a 89-year-old female with a history of HTN, HLD, hypothyroidism, hx TIA approximately 6 months ago, paroxysmal a-fib, and hx breast cancer status post mastectomy who presented to the ED for altered mental status. Family called EMS as patient was \"not acting right around midnight\" overnight 2024. Patient apparently found sleeping on the floor. Per home health nurse, patient was \"using the restroom a lot\" the day prior.   In the ED, aefbrile and hemodynamically stable. Significant initial albs showed WBC 11.9, BUN 23, and C2 2,202. Patient reported dysuria while in the ED. She was noted to be lethargic. Urinalysis negative for signs of infection. CT head negative negative for acute process. CTA head/neck negative for acute vascular abnormality, large vessel occlusion or significant stenosis. Did note 1.2 cm meningioma in posterior falx. Meningioma noted on prior scan in 2024. Patient admitted for further management. Empirically started on IV ceftriaxone given patient symptomatic for UTI despite negative urinalysis. Started on IV fluids. Urine culture negative. IV ceftriaxone discontinued. Patient had episode of bradycardia with HR lowest 38 bpm, maintaining in the 40s. Cardiology consulted. Home dose amiodarone decreased. No indication for pacemaker at this time. TSH 5.30, elevated. Home Synthroid dose increased. Pending SNF choice.     Assessment / Plan:    Bradycardia   - Called by telemetry on 11/15  - Patient asymptomatic   - Twelve-lead EKG with sinus bradycardia with PACs noted.  - Baseline heart rate in the high 40s- low 50s   - Cardiology consult  - Cardiac monitoring  - Home amiodarone dose decreased  - No indication for pacemaker 
1730- PCT and nurse attempted to set up and feed pt but pt refused.   1815- PCT and nurse attempted again to set up and feed pt but pt refused and stated \" And Mrs. Clemente feed me like a baby\".   
4 Eyes Skin Assessment     NAME:  Julee Mcclellan  YOB: 1931  MEDICAL RECORD NUMBER:  [id number]    The patient is being assessed for  Admission    I agree that at least one RN has performed a thorough Head to Toe Skin Assessment on the patient. ALL assessment sites listed below have been assessed.      Areas assessed by both nurses:    Head, Face, Ears, Shoulders, Back, Chest, Arms, Elbows, Hands, Sacrum. Buttock, Coccyx, Ischium, and Legs. Feet and Heels        Does the Patient have a Wound? No noted wound(s)       Bautista Prevention initiated by RN: Yes  Wound Care Orders initiated by RN: No    Pressure Injury (Stage 3,4, Unstageable, DTI, NWPT, and Complex wounds) if present, place Wound referral order by RN under : No    New Ostomies, if present place, Ostomy referral order under : No     Nurse 1 eSignature: Electronically signed by Madison Smith RN on 11/14/24 at 1:42 AM EST    **SHARE this note so that the co-signing nurse can place an eSignature**    Nurse 2 eSignature: Electronically signed by Carolyn Muñoz RN on 11/14/24 at 1:53 AM EST   
4 Eyes Skin Assessment     NAME:  Julee Mcclellan  YOB: 1931  MEDICAL RECORD NUMBER:  [id number]    The patient is being assessed for  Admission    I agree that at least one RN has performed a thorough Head to Toe Skin Assessment on the patient. ALL assessment sites listed below have been assessed.      Areas assessed by both nurses:    Head, Face, Ears, Shoulders, Back, Chest, Arms, Elbows, Hands, Sacrum. Buttock, Coccyx, Ischium, Legs. Feet and Heels, and Under Medical Devices         Does the Patient have a Wound? No noted wound(s)       Bautista Prevention initiated by RN: Yes  Wound Care Orders initiated by RN: No    Pressure Injury (Stage 3,4, Unstageable, DTI, NWPT, and Complex wounds) if present, place Wound referral order by RN under : No    New Ostomies, if present place, Ostomy referral order under : No     Nurse 1 eSignature: Electronically signed by Taryn Smith RN on 11/13/24 at 7:18 PM EST    **SHARE this note so that the co-signing nurse can place an eSignature**    Nurse 2 eSignature: Electronically signed by Willa Velasquez RN on 11/13/24 at 7:19 PM EST   
Discharge instructions reviewed with patient. All questions answered. Telemetry box removed by primary nurse. No IV access. Report called to [facility].            
OT tx attempted at 1050 however RN requesting to hold d/t HR. Will continue to follow and re-attempt OT at a later time. Thank you.     
PHYSICAL THERAPY EVALUATION  Patient: Julee Mcclellan (89 y.o. female)  Date: 11/14/2024  Primary Diagnosis: Metabolic encephalopathy [G93.41]  Meningioma (HCC) [D32.9]  Altered mental status, unspecified altered mental status type [R41.82]       Precautions: Restrictions/Precautions  Restrictions/Precautions: Fall Risk, Bed Alarm     Recommendations for nursing mobility: AD and gt belt for bed to chair , Amb to bathroom with AD and gait belt, Amb in hallway, and Assist x1    In place during session: Peripheral IV and EKG/telemetry     ASSESSMENT  Pt is a 89 y.o. female admitted on 11/13/2024 for confusion and lethargy; pt currently being treated for possible UTI, metabolic encephalopathy, TEJAS on CKD stage III, HTN, and hypothyroidism. Pt semi-supine in bed upon PT arrival, agreeable to evaluation. Pt A&O x 4 with additional time for month and year.     Based on the objective data described below, the patient currently presents with impaired functional mobility, decreased independence in ADLs, decreased ROM, impaired strength, decreased activity tolerance, poor safety awareness, impaired cognition, impaired balance, and impaired posture. (See below for objective details and assist levels).     Overall pt tolerated session fair today with no c/o pain or dizziness throughout session. Pt required min A for bed mobility and transfers. Pt amb 30 feet (15 feet x2 bed>bathroom>bedside recliner) with RW, gt belt and CGA to min A; demonstrates NBOS with short, shuffling, step to gt pattern with several episodes of LOB mainly during directional changes and when attempting to complete hand hygiene at sink. Pt will benefit from continued skilled PT to address above deficits and return to PLOF.  Current PT DC recommendation Moderate intensity short-term skilled physical therapy up to 5x/week  once medically appropriate.     Start of Session End of Session   SPO2 (%) 99 98   Heart Rate (BPM) 57 60     GOALS:    Problem: 
Progress Note      11/16/2024 10:55 AM  NAME: Julee Mcclellan   MRN:  [id number]   Admit Diagnosis: Metabolic encephalopathy [G93.41]  Meningioma (HCC) [D32.9]  Altered mental status, unspecified altered mental status type [R41.82]          Assessment/Plan:   Sinus bradycardia, sinus, without pauses.  Telemetry strips in chart reviewed, without pathology.    Hypothyroidism, adequately replaced    Paroxysmal A-fib, continues amiodarone, dose has been decreased to 200 daily.  Maintaining sinus rhythm.    Hypertension, blood pressure still suboptimal, amlodipine has been added.  Will continue to monitor.                 []       High complexity decision making was performed in this patient at high risk for decompensation with multiple organ involvement.    Subjective:     Julee Mcclellan denies chest pain, dyspnea.  Discussed with RN events overnight.     Review of Systems:    Symptom Y/N Comments  Symptom Y/N Comments   Fever/Chills N   Chest Pain N    Poor Appetite N   Edema N    Cough N   Abdominal Pain N    Sputum N   Joint Pain N    SOB/MICHAEL N   Pruritis/Rash N    Nausea/vomit N   Tolerating PT/OT Y    Diarrhea N   Tolerating Diet Y    Constipation N   Other       Could NOT obtain due to:      Objective:      Physical Exam:    Last 24hrs VS reviewed since prior progress note. Most recent are:    BP (!) 156/61   Pulse (!) 49   Temp 98.8 °F (37.1 °C) (Axillary)   Resp 16   Ht 1.674 m (5' 5.91\")   Wt 52.8 kg (116 lb 6.5 oz)   SpO2 97%   BMI 18.84 kg/m²     Intake/Output Summary (Last 24 hours) at 11/16/2024 1055  Last data filed at 11/16/2024 0641  Gross per 24 hour   Intake 150 ml   Output --   Net 150 ml        General Appearance: Well developed, well nourished, alert; no acute distress.  Ears/Nose/Mouth/Throat: Hearing grossly normal; moist mucous membranes  Neck: Supple.  Chest: Lungs clear to auscultation bilaterally.  Cardiovascular: Regular rhythm, low rate, S1S2 normal, 2/6 systolic 
Pt alertx4 at admission. Family in room. Pt rested well. Became slightly confused in the am. Pulled IV out. Was replaced by nurse.   Pt resting well again.   
Pt refused morning labs. Notified physician via perfect serve.   
Received Order for Telemetry     Julee Mcclellan   5/9/1931   [id number]   Metabolic encephalopathy [G93.41]  Meningioma (HCC) [D32.9]  Altered mental status, unspecified altered mental status type [R41.82]   Jai Cruz MD     Tele Box # [id number] placed on patient at  2222 pm  ER Room # n/a  Admitting to Room 564  Verified with Primary Nurse Ivania at  2222 pm    
Urine sample sent to lab.   
drinking and of water too.  She reports having an episode of dysuria during her stay in the ER.  She denies fever, chills, nausea, vomiting, headache, palpitations or lightheadedness.  Her lab work is significant for CR 2.02.  UA is unremarkable.  She is afebrile with mild leukocytosis.      11/14  Patient is laying comfortably in bed and currently alert and oriented x 3.  She denies any pain, dysuria, abdominal pain, nausea/vomiting or overnight fever/chills.    Counseled on initially treating for possible UTI however urinalysis is normal.  TEJAS is slowly improving with IV fluids.  Pending PT/OT evaluation.      11/15  RRT called around 9:45 AM due to bradycardia with heart rate at 38.  Patient evaluated and sitting comfortably in bedside chair and continues to be alert and oriented x 3.  Denies any symptoms, chest pain, shortness of breath, nausea vomiting or palpitations.  She denies any pain, dysuria, abdominal pain, nausea/vomiting or overnight fever/chills.    Discussed with nursing and we will obtain twelve-lead EKG as well as possible cardiology consultation however chronic heart rate seems to be in the 50s.  CM has been discussing SNF for rehab with family.    Objective:     VITALS:   Last 24hrs VS reviewed since prior progress note. Most recent are:  Patient Vitals for the past 24 hrs:   BP Temp Temp src Pulse Resp SpO2   11/15/24 0938 (!) 143/65 97.8 °F (36.6 °C) Oral (!) 38 16 98 %   11/15/24 0746 (!) 148/55 98.1 °F (36.7 °C) Oral 50 16 95 %   11/15/24 0730 (!) 166/63 98.4 °F (36.9 °C) Oral 51 18 99 %   11/15/24 0701 -- -- -- 50 -- --   11/15/24 0656 -- -- -- 51 -- --   11/15/24 0003 -- -- -- 50 -- --   11/14/24 2315 (!) 154/55 97.7 °F (36.5 °C) Oral 56 16 95 %   11/14/24 2045 (!) 173/67 -- -- -- -- --   11/14/24 1930 (!) 189/72 97.8 °F (36.6 °C) Oral 50 18 97 %   11/14/24 1500 (!) 151/65 97.8 °F (36.6 °C) Oral 59 -- 99 %         Intake/Output Summary (Last 24 hours) at 11/15/2024 1353  Last data filed at 
       Multidiciplinary team rounds were held today with , nursing, pharmacist and clinical coordinator.  Patient's plan of care was discussed; medications were reviewed and discharge planning was addressed.     ________________________________________________________________________  Total NON critical care TIME:  35  Minutes    Total CRITICAL CARE TIME Spent:   Minutes non procedure based      Comments   >50% of visit spent in counseling and coordination of care     ________________________________________________________________________  Eusebio Nguyen PA-C     Procedures: see electronic medical records for all procedures/Xrays and details which were not copied into this note but were reviewed prior to creation of Plan.      LABS:  I reviewed today's most current labs and imaging studies.  Pertinent labs include:  Recent Labs     11/16/24  0525 11/17/24  0535   WBC 8.2 8.8   HGB 11.9 12.6   HCT 36.6 38.3    208     Recent Labs     11/16/24  0525 11/17/24  0535    140   K 3.6 3.4*   * 108   CO2 26 24   BUN 20 17       Signed: Eusebio Nguyen PA-C

## 2024-12-10 ENCOUNTER — HOSPITAL ENCOUNTER (EMERGENCY)
Facility: HOSPITAL | Age: 88
Discharge: NURSING, REHAB OR LONG-TERM CARE | End: 2024-12-11
Attending: EMERGENCY MEDICINE
Payer: MEDICARE

## 2024-12-10 ENCOUNTER — APPOINTMENT (OUTPATIENT)
Facility: HOSPITAL | Age: 88
End: 2024-12-10
Payer: MEDICARE

## 2024-12-10 VITALS
OXYGEN SATURATION: 100 % | BODY MASS INDEX: 19.37 KG/M2 | TEMPERATURE: 97.8 F | HEART RATE: 57 BPM | DIASTOLIC BLOOD PRESSURE: 69 MMHG | HEIGHT: 65 IN | RESPIRATION RATE: 18 BRPM | SYSTOLIC BLOOD PRESSURE: 110 MMHG

## 2024-12-10 DIAGNOSIS — R45.1 AGITATION: Primary | ICD-10-CM

## 2024-12-10 LAB
ALBUMIN SERPL-MCNC: 2.8 G/DL (ref 3.5–5)
ALBUMIN/GLOB SERPL: 0.7 (ref 1.1–2.2)
ALP SERPL-CCNC: 105 U/L (ref 45–117)
ALT SERPL-CCNC: 38 U/L (ref 12–78)
ANION GAP SERPL CALC-SCNC: 5 MMOL/L (ref 2–12)
APPEARANCE UR: CLEAR
AST SERPL-CCNC: 33 U/L (ref 15–37)
BACTERIA URNS QL MICRO: NEGATIVE /HPF
BASOPHILS # BLD: 0 K/UL (ref 0–0.1)
BASOPHILS NFR BLD: 0 % (ref 0–1)
BILIRUB SERPL-MCNC: 0.2 MG/DL (ref 0.2–1)
BILIRUB UR QL CFM: NEGATIVE
BUN SERPL-MCNC: 26 MG/DL (ref 6–20)
BUN/CREAT SERPL: 17 (ref 12–20)
CALCIUM SERPL-MCNC: 8.7 MG/DL (ref 8.5–10.1)
CHLORIDE SERPL-SCNC: 110 MMOL/L (ref 97–108)
CO2 SERPL-SCNC: 25 MMOL/L (ref 21–32)
COLOR UR: ABNORMAL
COMMENT:: NORMAL
CREAT SERPL-MCNC: 1.5 MG/DL (ref 0.55–1.02)
DIFFERENTIAL METHOD BLD: ABNORMAL
EOSINOPHIL # BLD: 0.1 K/UL (ref 0–0.4)
EOSINOPHIL NFR BLD: 1 % (ref 0–7)
EPITH CASTS URNS QL MICRO: ABNORMAL /LPF
ERYTHROCYTE [DISTWIDTH] IN BLOOD BY AUTOMATED COUNT: 14.1 % (ref 11.5–14.5)
GLOBULIN SER CALC-MCNC: 4.3 G/DL (ref 2–4)
GLUCOSE SERPL-MCNC: 111 MG/DL (ref 65–100)
GLUCOSE UR STRIP.AUTO-MCNC: NEGATIVE MG/DL
HCT VFR BLD AUTO: 37.2 % (ref 35–47)
HGB BLD-MCNC: 12.4 G/DL (ref 11.5–16)
HGB UR QL STRIP: NEGATIVE
IMM GRANULOCYTES # BLD AUTO: 0.1 K/UL (ref 0–0.04)
IMM GRANULOCYTES NFR BLD AUTO: 1 % (ref 0–0.5)
KETONES UR QL STRIP.AUTO: 15 MG/DL
LEUKOCYTE ESTERASE UR QL STRIP.AUTO: ABNORMAL
LYMPHOCYTES # BLD: 1.6 K/UL (ref 0.8–3.5)
LYMPHOCYTES NFR BLD: 17 % (ref 12–49)
MAGNESIUM SERPL-MCNC: 2.1 MG/DL (ref 1.6–2.4)
MCH RBC QN AUTO: 30.8 PG (ref 26–34)
MCHC RBC AUTO-ENTMCNC: 33.3 G/DL (ref 30–36.5)
MCV RBC AUTO: 92.3 FL (ref 80–99)
MONOCYTES # BLD: 0.9 K/UL (ref 0–1)
MONOCYTES NFR BLD: 10 % (ref 5–13)
NEUTS SEG # BLD: 6.6 K/UL (ref 1.8–8)
NEUTS SEG NFR BLD: 71 % (ref 32–75)
NITRITE UR QL STRIP.AUTO: NEGATIVE
NRBC # BLD: 0 K/UL (ref 0–0.01)
NRBC BLD-RTO: 0 PER 100 WBC
PH UR STRIP: 5 (ref 5–8)
PLATELET # BLD AUTO: 206 K/UL (ref 150–400)
PMV BLD AUTO: 11.5 FL (ref 8.9–12.9)
POTASSIUM SERPL-SCNC: 3.7 MMOL/L (ref 3.5–5.1)
PROT SERPL-MCNC: 7.1 G/DL (ref 6.4–8.2)
PROT UR STRIP-MCNC: ABNORMAL MG/DL
RBC # BLD AUTO: 4.03 M/UL (ref 3.8–5.2)
RBC #/AREA URNS HPF: ABNORMAL /HPF (ref 0–5)
SODIUM SERPL-SCNC: 140 MMOL/L (ref 136–145)
SP GR UR REFRACTOMETRY: >1.03 (ref 1–1.03)
SPECIMEN HOLD: NORMAL
UROBILINOGEN UR QL STRIP.AUTO: 1 EU/DL (ref 0.2–1)
WBC # BLD AUTO: 9.3 K/UL (ref 3.6–11)
WBC URNS QL MICRO: ABNORMAL /HPF (ref 0–4)

## 2024-12-10 PROCEDURE — 51701 INSERT BLADDER CATHETER: CPT

## 2024-12-10 PROCEDURE — 83735 ASSAY OF MAGNESIUM: CPT

## 2024-12-10 PROCEDURE — 81001 URINALYSIS AUTO W/SCOPE: CPT

## 2024-12-10 PROCEDURE — 6370000000 HC RX 637 (ALT 250 FOR IP): Performed by: EMERGENCY MEDICINE

## 2024-12-10 PROCEDURE — 70450 CT HEAD/BRAIN W/O DYE: CPT

## 2024-12-10 PROCEDURE — 80053 COMPREHEN METABOLIC PANEL: CPT

## 2024-12-10 PROCEDURE — 36415 COLL VENOUS BLD VENIPUNCTURE: CPT

## 2024-12-10 PROCEDURE — 85025 COMPLETE CBC W/AUTO DIFF WBC: CPT

## 2024-12-10 PROCEDURE — 99284 EMERGENCY DEPT VISIT MOD MDM: CPT

## 2024-12-10 RX ORDER — QUETIAPINE FUMARATE 25 MG/1
25 TABLET, FILM COATED ORAL NIGHTLY PRN
Qty: 30 TABLET | Refills: 0 | Status: SHIPPED | OUTPATIENT
Start: 2024-12-10

## 2024-12-10 RX ORDER — QUETIAPINE FUMARATE 25 MG/1
25 TABLET, FILM COATED ORAL
Status: COMPLETED | OUTPATIENT
Start: 2024-12-10 | End: 2024-12-10

## 2024-12-10 RX ORDER — QUETIAPINE FUMARATE 25 MG/1
25 TABLET, FILM COATED ORAL NIGHTLY PRN
Qty: 30 TABLET | Refills: 0 | Status: SHIPPED | OUTPATIENT
Start: 2024-12-10 | End: 2024-12-10

## 2024-12-10 RX ADMIN — QUETIAPINE FUMARATE 25 MG: 25 TABLET ORAL at 23:43

## 2024-12-10 ASSESSMENT — ENCOUNTER SYMPTOMS
VOMITING: 0
COUGH: 0
SORE THROAT: 0

## 2024-12-11 NOTE — ED NOTES
TRANSFER - OUT REPORT:    Verbal report given to [facility] on Julee Mcclellan  being transferred to home/rehab for routine progression of patient care       Report consisted of patient's Situation, Background, Assessment and   Recommendations(SBAR).     Information from the following report(s) Nurse Handoff Report and ED SBAR was reviewed with the receiving nurse.    [place] Fall Assessment:    Presents to emergency department  because of falls (Syncope, seizure, or loss of consciousness): No  Age > 70: Yes  Altered Mental Status, Intoxication with alcohol or substance confusion (Disorientation, impaired judgment, poor safety awaremess, or inability to follow instructions): Yes  Impaired Mobility: Ambulates or transfers with assistive devices or assistance; Unable to ambulate or transer.: Yes  Nursing Judgement: Yes          Lines:       Opportunity for questions and clarification was provided.      Patient transported with:

## 2024-12-11 NOTE — ED PROVIDER NOTES
General: There is no distension.   Musculoskeletal:         General: No deformity.      Cervical back: Neck supple.   Skin:     Findings: No rash.   Neurological:      General: No focal deficit present.      Mental Status: She is alert and oriented to person, place, and time.         DIAGNOSTIC RESULTS     EKG: All EKG's are interpreted by the Emergency Department Physician who either signs or Co-signs this chart in the absence of a cardiologist.        RADIOLOGY:   Non-plain film images such as CT, Ultrasound and MRI are read by the radiologist. Plain radiographic images are visualized and preliminarily interpreted by the emergency physician with the below findings:        Interpretation per the Radiologist below, if available at the time of this note:    CT HEAD WO CONTRAST   Final Result   No acute intracranial process.         Electronically signed by ZHAO MCGUIRE           LABS:  Labs Reviewed   CBC WITH AUTO DIFFERENTIAL - Abnormal; Notable for the following components:       Result Value    Immature Granulocytes % 1 (*)     Immature Granulocytes Absolute 0.1 (*)     All other components within normal limits   COMPREHENSIVE METABOLIC PANEL - Abnormal; Notable for the following components:    Chloride 110 (*)     Glucose 111 (*)     BUN 26 (*)     Creatinine 1.50 (*)     Est, Glom Filt Rate 32 (*)     Albumin 2.8 (*)     Globulin 4.3 (*)     Albumin/Globulin Ratio 0.7 (*)     All other components within normal limits   URINALYSIS WITH MICROSCOPIC - Abnormal; Notable for the following components:    Specific Gravity, UA >1.030 (*)     Protein, UA TRACE (*)     Ketones, Urine 15 (*)     Leukocyte Esterase, Urine TRACE (*)     All other components within normal limits   MAGNESIUM   EXTRA TUBES HOLD   BILIRUBIN, CONFIRMATORY       All other labs were within normal range or not returned as of this dictation.    EMERGENCY DEPARTMENT COURSE and DIFFERENTIAL DIAGNOSIS/MDM:   Vitals:    Vitals:    12/10/24 2200 12/10/24

## 2024-12-11 NOTE — ED TRIAGE NOTES
Patient arrives to ED via EMS from [facility] with c/o agitation per nursing staff.    Patient alert to self, disoriented to situation, time and place    Per EMS, nursing staff states that patient is becoming increasingly agitated with cognitive impairment

## 2024-12-11 NOTE — ED NOTES
Discharge instructions and medications reviewed with patient's daughter at bedside. Questions and clarifications were provided. Patient awaiting transport for d/c.

## 2024-12-11 NOTE — ED NOTES
This RN called [facility] to updated them on the patient being discharged. RN at [facility] states that are unable to accommodate the patient at their facility due to her needing one on one care. This RN informed the nurse that we are unable to keep the patient here due to her being discharged by the provider. RN at [facility] states that she will have to talk to her supervisor and call back. Provider aware of [facility] concerns.     Patient has not been combative or agitated during her stay here it the ED.

## 2024-12-18 PROBLEM — N39.0 UTI (URINARY TRACT INFECTION): Status: RESOLVED | Noted: 2024-11-16 | Resolved: 2024-12-18

## 2025-01-09 ENCOUNTER — APPOINTMENT (OUTPATIENT)
Facility: HOSPITAL | Age: 89
End: 2025-01-09
Payer: MEDICARE

## 2025-01-09 ENCOUNTER — HOSPITAL ENCOUNTER (EMERGENCY)
Facility: HOSPITAL | Age: 89
Discharge: ANOTHER HOSPITAL | End: 2025-01-09
Attending: EMERGENCY MEDICINE
Payer: MEDICARE

## 2025-01-09 ENCOUNTER — HOSPITAL ENCOUNTER (INPATIENT)
Facility: HOSPITAL | Age: 89
LOS: 7 days | Discharge: HOSPICE | End: 2025-01-16
Attending: INTERNAL MEDICINE | Admitting: INTERNAL MEDICINE
Payer: MEDICARE

## 2025-01-09 VITALS
TEMPERATURE: 97.7 F | SYSTOLIC BLOOD PRESSURE: 108 MMHG | RESPIRATION RATE: 13 BRPM | OXYGEN SATURATION: 98 % | WEIGHT: 130 LBS | HEART RATE: 49 BPM | DIASTOLIC BLOOD PRESSURE: 55 MMHG | BODY MASS INDEX: 21.63 KG/M2

## 2025-01-09 DIAGNOSIS — S06.5XAA SDH (SUBDURAL HEMATOMA): ICD-10-CM
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
## 2025-01-09 LAB
ABO + RH BLD: NORMAL
ANION GAP SERPL CALC-SCNC: 7 MMOL/L (ref 2–12)
APPEARANCE UR: CLEAR
APTT PPP: 29 SEC (ref 21.2–34.1)
BACTERIA URNS QL MICRO: ABNORMAL /HPF
BASOPHILS # BLD: 0.03 K/UL (ref 0–0.1)
BASOPHILS NFR BLD: 0.4 % (ref 0–1)
BILIRUB UR QL: NEGATIVE
BLOOD GROUP ANTIBODIES SERPL: NORMAL
BUN SERPL-MCNC: 22 MG/DL (ref 8–23)
BUN/CREAT SERPL: 19 (ref 12–20)
CALCIUM SERPL-MCNC: 8.4 MG/DL (ref 8.2–9.6)
CHLORIDE SERPL-SCNC: 111 MMOL/L (ref 98–107)
CO2 SERPL-SCNC: 26 MMOL/L (ref 22–29)
COLOR UR: ABNORMAL
CREAT SERPL-MCNC: 1.16 MG/DL (ref 0.5–0.9)
DIFFERENTIAL METHOD BLD: ABNORMAL
EKG ATRIAL RATE: 56 BPM
EKG DIAGNOSIS: NORMAL
EKG P AXIS: 59 DEGREES
EKG P-R INTERVAL: 182 MS
EKG Q-T INTERVAL: 388 MS
EKG QRS DURATION: 84 MS
EKG QTC CALCULATION (BAZETT): 374 MS
EKG R AXIS: -39 DEGREES
EKG T AXIS: 35 DEGREES
EKG VENTRICULAR RATE: 56 BPM
EOSINOPHIL # BLD: 0.08 K/UL (ref 0–0.4)
EOSINOPHIL NFR BLD: 1.1 % (ref 0–7)
EPITH CASTS URNS QL MICRO: ABNORMAL /LPF
ERYTHROCYTE [DISTWIDTH] IN BLOOD BY AUTOMATED COUNT: 16 % (ref 11.5–14.5)
GLUCOSE BLD STRIP.AUTO-MCNC: 103 MG/DL (ref 65–117)
GLUCOSE SERPL-MCNC: 111 MG/DL (ref 65–100)
GLUCOSE UR STRIP.AUTO-MCNC: NEGATIVE MG/DL
HCT VFR BLD AUTO: 29.4 % (ref 35–47)
HGB BLD-MCNC: 10 G/DL (ref 11.5–16)
HGB UR QL STRIP: NEGATIVE
HYALINE CASTS URNS QL MICRO: ABNORMAL /LPF (ref 0–5)
IMM GRANULOCYTES # BLD AUTO: 0.04 K/UL (ref 0–0.04)
IMM GRANULOCYTES NFR BLD AUTO: 0.5 % (ref 0–0.5)
INR PPP: 1 (ref 0.9–1.1)
KETONES UR QL STRIP.AUTO: NEGATIVE MG/DL
LEUKOCYTE ESTERASE UR QL STRIP.AUTO: NEGATIVE
LYMPHOCYTES # BLD: 0.95 K/UL (ref 0.8–3.5)
LYMPHOCYTES NFR BLD: 12.8 % (ref 12–49)
MCH RBC QN AUTO: 31.6 PG (ref 26–34)
MCHC RBC AUTO-ENTMCNC: 34 G/DL (ref 30–36.5)
MCV RBC AUTO: 93 FL (ref 80–99)
MONOCYTES # BLD: 0.67 K/UL (ref 0–1)
MONOCYTES NFR BLD: 9 % (ref 5–13)
NEUTS SEG # BLD: 5.66 K/UL (ref 1.8–8)
NEUTS SEG NFR BLD: 76.2 % (ref 32–75)
NITRITE UR QL STRIP.AUTO: NEGATIVE
NRBC # BLD: 0 K/UL (ref 0–0.01)
NRBC BLD-RTO: 0 PER 100 WBC
PH UR STRIP: 5 (ref 5–8)
PLATELET # BLD AUTO: 226 K/UL (ref 150–400)
PMV BLD AUTO: 11.2 FL (ref 8.9–12.9)
POTASSIUM SERPL-SCNC: 3.5 MMOL/L (ref 3.5–5.1)
PROT UR STRIP-MCNC: NEGATIVE MG/DL
PROTHROMBIN TIME: 13.6 SEC (ref 11.9–14.6)
RBC # BLD AUTO: 3.16 M/UL (ref 3.8–5.2)
RBC #/AREA URNS HPF: ABNORMAL /HPF (ref 0–5)
SERVICE CMNT-IMP: NORMAL
SODIUM SERPL-SCNC: 144 MMOL/L (ref 136–145)
SP GR UR REFRACTOMETRY: 1.01 (ref 1–1.03)
SPECIMEN EXP DATE BLD: NORMAL
THERAPEUTIC RANGE: NORMAL SECS (ref 82–109)
URINE CULTURE IF INDICATED: ABNORMAL
UROBILINOGEN UR QL STRIP.AUTO: 0.2 EU/DL (ref 0.2–1)
WBC # BLD AUTO: 7.4 K/UL (ref 3.6–11)
WBC URNS QL MICRO: ABNORMAL /HPF (ref 0–4)

## 2025-01-09 PROCEDURE — 2580000003 HC RX 258: Performed by: PHYSICIAN ASSISTANT

## 2025-01-09 PROCEDURE — 85730 THROMBOPLASTIN TIME PARTIAL: CPT

## 2025-01-09 PROCEDURE — 93005 ELECTROCARDIOGRAM TRACING: CPT | Performed by: EMERGENCY MEDICINE

## 2025-01-09 PROCEDURE — 2500000003 HC RX 250 WO HCPCS

## 2025-01-09 PROCEDURE — 72125 CT NECK SPINE W/O DYE: CPT

## 2025-01-09 PROCEDURE — 36415 COLL VENOUS BLD VENIPUNCTURE: CPT

## 2025-01-09 PROCEDURE — 86850 RBC ANTIBODY SCREEN: CPT

## 2025-01-09 PROCEDURE — 86900 BLOOD TYPING SEROLOGIC ABO: CPT

## 2025-01-09 PROCEDURE — 2580000003 HC RX 258: Performed by: EMERGENCY MEDICINE

## 2025-01-09 PROCEDURE — 70450 CT HEAD/BRAIN W/O DYE: CPT

## 2025-01-09 PROCEDURE — 96375 TX/PRO/DX INJ NEW DRUG ADDON: CPT

## 2025-01-09 PROCEDURE — 96374 THER/PROPH/DIAG INJ IV PUSH: CPT

## 2025-01-09 PROCEDURE — 51701 INSERT BLADDER CATHETER: CPT

## 2025-01-09 PROCEDURE — 2000000000 HC ICU R&B

## 2025-01-09 PROCEDURE — 82962 GLUCOSE BLOOD TEST: CPT

## 2025-01-09 PROCEDURE — 80048 BASIC METABOLIC PNL TOTAL CA: CPT

## 2025-01-09 PROCEDURE — 85610 PROTHROMBIN TIME: CPT

## 2025-01-09 PROCEDURE — 99285 EMERGENCY DEPT VISIT HI MDM: CPT

## 2025-01-09 PROCEDURE — 93010 ELECTROCARDIOGRAM REPORT: CPT | Performed by: SPECIALIST

## 2025-01-09 PROCEDURE — 81001 URINALYSIS AUTO W/SCOPE: CPT

## 2025-01-09 PROCEDURE — 85025 COMPLETE CBC W/AUTO DIFF WBC: CPT

## 2025-01-09 PROCEDURE — 6360000002 HC RX W HCPCS: Performed by: EMERGENCY MEDICINE

## 2025-01-09 PROCEDURE — 86901 BLOOD TYPING SEROLOGIC RH(D): CPT

## 2025-01-09 RX ORDER — ACETAMINOPHEN 325 MG/1
650 TABLET ORAL EVERY 6 HOURS PRN
Status: DISCONTINUED | OUTPATIENT
Start: 2025-01-09 | End: 2025-01-16 | Stop reason: HOSPADM

## 2025-01-09 RX ORDER — SODIUM CHLORIDE, SODIUM LACTATE, POTASSIUM CHLORIDE, AND CALCIUM CHLORIDE .6; .31; .03; .02 G/100ML; G/100ML; G/100ML; G/100ML
500 INJECTION, SOLUTION INTRAVENOUS ONCE
Status: COMPLETED | OUTPATIENT
Start: 2025-01-09 | End: 2025-01-09

## 2025-01-09 RX ORDER — 0.9 % SODIUM CHLORIDE 0.9 %
1000 INTRAVENOUS SOLUTION INTRAVENOUS ONCE
Status: COMPLETED | OUTPATIENT
Start: 2025-01-09 | End: 2025-01-09

## 2025-01-09 RX ORDER — SODIUM CHLORIDE 0.9 % (FLUSH) 0.9 %
5-40 SYRINGE (ML) INJECTION PRN
Status: DISCONTINUED | OUTPATIENT
Start: 2025-01-09 | End: 2025-01-16 | Stop reason: HOSPADM

## 2025-01-09 RX ORDER — AMOXICILLIN 250 MG
1 CAPSULE ORAL 2 TIMES DAILY
Status: ON HOLD | COMMUNITY

## 2025-01-09 RX ORDER — ONDANSETRON 2 MG/ML
4 INJECTION INTRAMUSCULAR; INTRAVENOUS EVERY 6 HOURS PRN
Status: DISCONTINUED | OUTPATIENT
Start: 2025-01-09 | End: 2025-01-16 | Stop reason: HOSPADM

## 2025-01-09 RX ORDER — SODIUM CHLORIDE 0.9 % (FLUSH) 0.9 %
5-40 SYRINGE (ML) INJECTION EVERY 12 HOURS SCHEDULED
Status: DISCONTINUED | OUTPATIENT
Start: 2025-01-09 | End: 2025-01-14

## 2025-01-09 RX ORDER — POLYETHYLENE GLYCOL 3350 17 G/17G
17 POWDER, FOR SOLUTION ORAL DAILY PRN
Status: ON HOLD | COMMUNITY

## 2025-01-09 RX ORDER — MIDAZOLAM HYDROCHLORIDE 2 MG/2ML
2 INJECTION, SOLUTION INTRAMUSCULAR; INTRAVENOUS PRN
Status: DISCONTINUED | OUTPATIENT
Start: 2025-01-09 | End: 2025-01-09

## 2025-01-09 RX ORDER — SODIUM CHLORIDE 9 MG/ML
INJECTION, SOLUTION INTRAVENOUS PRN
Status: DISCONTINUED | OUTPATIENT
Start: 2025-01-09 | End: 2025-01-16 | Stop reason: HOSPADM

## 2025-01-09 RX ORDER — BISACODYL 10 MG
10 SUPPOSITORY, RECTAL RECTAL DAILY PRN
Status: ON HOLD | COMMUNITY

## 2025-01-09 RX ORDER — LORAZEPAM 0.5 MG/1
0.5 TABLET ORAL DAILY
Status: ON HOLD | COMMUNITY

## 2025-01-09 RX ORDER — LEVETIRACETAM 500 MG/5ML
1000 INJECTION, SOLUTION, CONCENTRATE INTRAVENOUS ONCE
Status: COMPLETED | OUTPATIENT
Start: 2025-01-09 | End: 2025-01-09

## 2025-01-09 RX ORDER — NOREPINEPHRINE BITARTRATE 0.06 MG/ML
1-100 INJECTION, SOLUTION INTRAVENOUS CONTINUOUS
Status: DISCONTINUED | OUTPATIENT
Start: 2025-01-09 | End: 2025-01-10

## 2025-01-09 RX ORDER — ACETAMINOPHEN 650 MG/1
650 SUPPOSITORY RECTAL EVERY 6 HOURS PRN
Status: DISCONTINUED | OUTPATIENT
Start: 2025-01-09 | End: 2025-01-16 | Stop reason: HOSPADM

## 2025-01-09 RX ORDER — LORAZEPAM 2 MG/ML
1 INJECTION INTRAMUSCULAR ONCE
Status: COMPLETED | OUTPATIENT
Start: 2025-01-09 | End: 2025-01-09

## 2025-01-09 RX ORDER — ONDANSETRON 4 MG/1
4 TABLET, ORALLY DISINTEGRATING ORAL EVERY 8 HOURS PRN
Status: DISCONTINUED | OUTPATIENT
Start: 2025-01-09 | End: 2025-01-16 | Stop reason: HOSPADM

## 2025-01-09 RX ADMIN — SODIUM CHLORIDE 1000 ML: 9 INJECTION, SOLUTION INTRAVENOUS at 13:52

## 2025-01-09 RX ADMIN — SODIUM CHLORIDE, PRESERVATIVE FREE 10 ML: 5 INJECTION INTRAVENOUS at 20:51

## 2025-01-09 RX ADMIN — SODIUM CHLORIDE, POTASSIUM CHLORIDE, SODIUM LACTATE AND CALCIUM CHLORIDE 500 ML: 600; 310; 30; 20 INJECTION, SOLUTION INTRAVENOUS at 21:37

## 2025-01-09 RX ADMIN — LEVETIRACETAM 1000 MG: 100 INJECTION INTRAVENOUS at 12:55

## 2025-01-09 RX ADMIN — LORAZEPAM 1 MG: 2 INJECTION, SOLUTION INTRAMUSCULAR; INTRAVENOUS at 13:51

## 2025-01-09 ASSESSMENT — PAIN SCALES - PAIN ASSESSMENT IN ADVANCED DEMENTIA (PAINAD)
CONSOLABILITY: NO NEED TO CONSOLE
FACIALEXPRESSION: SMILING OR INEXPRESSIVE
TOTALSCORE: 0
BREATHING: NORMAL
BODYLANGUAGE: RELAXED
BREATHING: NORMAL
CONSOLABILITY: NO NEED TO CONSOLE
FACIALEXPRESSION: SMILING OR INEXPRESSIVE
TOTALSCORE: 0
BODYLANGUAGE: RELAXED

## 2025-01-09 ASSESSMENT — PAIN - FUNCTIONAL ASSESSMENT: PAIN_FUNCTIONAL_ASSESSMENT: PAIN ASSESSMENT IN ADVANCED DEMENTIA (PAINAD)

## 2025-01-09 NOTE — ED NOTES
Code Stroke Level: 2  Signs and symptoms: incidental finding-fall   Code Stroke activation time: 1244  Provider at bedside time:  during triage  VAN score: Negative  Last Known Well (Time): unknown  Blood Glucose Result/Time: 103 @ 1205   Blood Pressure: 135/62  Anticoagulants (List medications):  mg daily, last dose this AM

## 2025-01-09 NOTE — ED TRIAGE NOTES
Patient arrives to ed via EMS from the crossings at [facility] after a witnessed mechanical ground level fall in which pt was walking with her walker and fell. Per ems pt was not complaining of anything however does have bruise to right eye. Per ems, staff at facility said patient had started acting different after the fall as she was aggressive biting and hitting, and had stopped talking. Pt sts the year is 1974, the month was December, and sts she is at [facility]. Dr jones at bedside for assessment

## 2025-01-09 NOTE — CONSULTS
89yo with very subtle SDH.  Okay to eat.  Monitor BP.  Repeat CT in am. No indication for neurosurgical intervention.

## 2025-01-09 NOTE — ED NOTES
Patient was transferred to transport stretcher. Is responsive to voice at this time. NIH handoff completed with [facility], unable to calculate score d/t condition.

## 2025-01-09 NOTE — ED NOTES
1244: Emergency line called  1244: Tele neuro called  1252: Neuro-Surgery called (Dr. Velázquez is on call)

## 2025-01-09 NOTE — H&P
CRITICAL CARE ADMISSION NOTE      Name: Julee Mcclellan   : 1931   MRN: [id number]   Date: 2025      Reason for ICU Admission: SDH       HISTORY OF PRESENT ILLNESS:   89 F w/ hx of Atrial fibrillation, HTN, dementia, anemia, MM, CKD presented to outside ED status post fall. Per report patient had a witnessed fall at her memory care facility today. Workup in the ED revealed small 2mm right SDH. Case was discussed with neurosurgeon Dr. Velázquez who recommended transfer to [facility] for neuro ICU admission. Per family, patient has had worsening mental status and failure to thrive over the past month or so. She has been in and out of the hospital as well as rehab and other nursing/memory facilities but essentially mental status continues to decline. At time of my interview patient does not answer questions or follow commands. She does move all extremities. Confirmed with family she is DNR/DNI.     Review of Systems   Unable to perform ROS: Mental status change       Past Medical History:      has a past medical history of Benign essential hypertension, Breast cancer (HCC), Breast cancer (HCC), Closed nondisplaced oblique fracture of shaft of right fibula with routine healing, subsequent encounter, Constipation in female, Elevated glucose, Elevated serum creatinine, Hypercholesteremia, Hyperthyroidism, Hypothyroidism, adult, Mild anemia, Sciatica of right side, Secondary hypercoagulable state (HCC), Smoldering multiple myeloma, and TIA (transient ischemic attack).    Past Surgical History:      has a past surgical history that includes Mastectomy (Left); Hysterectomy; IR ASP BONE MARROW (12/15/2017); and cyst removal.    Home Medications:     Prior to Admission medications    Medication Sig Start Date End Date Taking? Authorizing Provider   LORazepam (ATIVAN) 0.5 MG tablet Take 1 tablet by mouth daily. Every morning at 0800 Max Daily Amount: 0.5 mg    Provider, MD Yosvany   senna-docusate (PERICOLACE)

## 2025-01-09 NOTE — ED NOTES
TRANSFER - OUT REPORT:    Verbal report given to Ericka Agosto RN on Julee Mcclellan  being transferred to [facility] for routine progression of patient care       Report consisted of patient's Situation, Background, Assessment and   Recommendations(SBAR).     Information from the following report(s) Nurse Handoff Report, ED Encounter Summary, ED SBAR, Adult Overview, MAR, Recent Results, and Cardiac Rhythm Sinus lucai  was reviewed with the receiving nurse.    [place] Fall Assessment:    Presents to emergency department  because of falls (Syncope, seizure, or loss of consciousness): Yes  Age > 70: Yes  Altered Mental Status, Intoxication with alcohol or substance confusion (Disorientation, impaired judgment, poor safety awaremess, or inability to follow instructions): Yes  Impaired Mobility: Ambulates or transfers with assistive devices or assistance; Unable to ambulate or transer.: Yes  Nursing Judgement: Yes          Lines:   Peripheral IV 01/09/25 Right Antecubital (Active)       Peripheral IV 01/09/25 Posterior;Right Hand (Active)        Opportunity for questions and clarification was provided.      Patient transported with:  [facility] transport. Present for report

## 2025-01-09 NOTE — PROGRESS NOTES
Brief NIS Note     No need for ICH Score given patient presents with a right subdural hematoma.     Sera Flores, NP   Neurovascular Nurse Practitioner

## 2025-01-09 NOTE — ED PROVIDER NOTES
Out of Food in the Last Year: Never true     Ran Out of Food in the Last Year: Never true   Transportation Needs: No Transportation Needs (11/13/2024)    PRAPARE - Transportation     Lack of Transportation (Medical): No     Lack of Transportation (Non-Medical): No   Physical Activity: Insufficiently Active (4/10/2024)    Exercise Vital Sign     Days of Exercise per Week: 3 days     Minutes of Exercise per Session: 30 min   Housing Stability: Low Risk  (11/13/2024)    Housing Stability Vital Sign     Unable to Pay for Housing in the Last Year: No     Number of Times Moved in the Last Year: 0     Homeless in the Last Year: No           PHYSICAL EXAM    (up to 7 for level 4, 8 or more for level 5)     ED Triage Vitals   BP Systolic BP Percentile Diastolic BP Percentile Temp Temp src Pulse Resp SpO2   -- -- -- -- -- -- -- --      Height Weight         -- --             There is no height or weight on file to calculate BMI.    Physical Exam  Constitutional:       General: She is not in acute distress.     Appearance: Normal appearance. She is not ill-appearing, toxic-appearing or diaphoretic.   HENT:      Head: Normocephalic.      Comments: Extensive ecchymosis of vary stages of healing right temporal region w/ small hematoma     Mouth/Throat:      Mouth: Mucous membranes are moist.      Pharynx: Oropharynx is clear. No oropharyngeal exudate or posterior oropharyngeal erythema.   Eyes:      General: No scleral icterus.     Extraocular Movements: Extraocular movements intact.      Conjunctiva/sclera: Conjunctivae normal.      Pupils: Pupils are equal, round, and reactive to light.   Cardiovascular:      Rate and Rhythm: Normal rate and regular rhythm.      Pulses: Normal pulses.      Heart sounds: No murmur heard.     No friction rub.   Pulmonary:      Effort: Pulmonary effort is normal. No respiratory distress.      Breath sounds: Normal breath sounds. No stridor. No wheezing, rhonchi or rales.   Abdominal:      General:

## 2025-01-09 NOTE — CARE COORDINATION
Pt's granddaughter called [facility] and  paged this after hours CM.  Kasey shared family's disposition plan.  Granddaughter Kasey Jin ph [phone].    Pt has been in long-term memory care at [facility] for 10 days and has had 4-5 falls. Family is making in-home caregiver arrangements. Family plans to have pt dc to her home that she shares with her 68 yo daughter who also has dementia. This dgt is Kasey's aunt.     This CM emailed Kasey the IM letter signed at admission today to [email or web address].    Pt will not be returning to [facility]. Family is talking with private duty caregiver agencies to make in-home care plans.     Daughter Rajwinder Doradoer is [facility] [phone] - Kasey's mother.     BRIAN Sterling    [facility]    or by Perfect Serve

## 2025-01-09 NOTE — PROGRESS NOTES
4 Eyes Skin Assessment     NAME:  Julee Mcclellan  YOB: 1931  MEDICAL RECORD NUMBER:  [id number]    The patient is being assessed for  Admission    I agree that at least one RN has performed a thorough Head to Toe Skin Assessment on the patient. ALL assessment sites listed below have been assessed.      Areas assessed by both nurses:    Head, Face, Ears, Shoulders, Back, Chest, Arms, Elbows, Hands, Sacrum. Buttock, Coccyx, Ischium, Legs. Feet and Heels, and Under Medical Devices         Does the Patient have a Wound? No noted wound(s)  Patient noted to have bruising at right temple/eye as well on her right hip/buttocks       Bautista Prevention initiated by RN: Yes  Wound Care Orders initiated by RN: No    Pressure Injury (Stage 3,4, Unstageable, DTI, NWPT, and Complex wounds) if present, place Wound referral order by RN under : No    New Ostomies, if present place, Ostomy referral order under : No     Nurse 1 eSignature: Electronically signed by Sharifa Agosto RN on 1/9/25 at 5:42 PM EST    **SHARE this note so that the co-signing nurse can place an eSignature**    Nurse 2 eSignature: {Esignature:[id number]}    Noted. Hep A will be ordered by RNs during visit.

## 2025-01-10 ENCOUNTER — APPOINTMENT (OUTPATIENT)
Facility: HOSPITAL | Age: 89
End: 2025-01-10
Attending: INTERNAL MEDICINE
Payer: MEDICARE

## 2025-01-10 LAB
AMMONIA PLAS-SCNC: 42 UMOL/L
ANION GAP SERPL CALC-SCNC: 5 MMOL/L (ref 2–12)
BUN SERPL-MCNC: 17 MG/DL (ref 6–20)
BUN/CREAT SERPL: 19 (ref 12–20)
CALCIUM SERPL-MCNC: 7.7 MG/DL (ref 8.5–10.1)
CHLORIDE SERPL-SCNC: 117 MMOL/L (ref 97–108)
CHOLEST SERPL-MCNC: 137 MG/DL
CO2 SERPL-SCNC: 22 MMOL/L (ref 21–32)
CORTIS AM PEAK SERPL-MCNC: 25.9 UG/DL (ref 4.3–22.45)
CREAT SERPL-MCNC: 0.88 MG/DL (ref 0.55–1.02)
ERYTHROCYTE [DISTWIDTH] IN BLOOD BY AUTOMATED COUNT: 16.4 % (ref 11.5–14.5)
EST. AVERAGE GLUCOSE BLD GHB EST-MCNC: 111 MG/DL
FOLATE SERPL-MCNC: 13.3 NG/ML (ref 5–21)
GLUCOSE BLD STRIP.AUTO-MCNC: 108 MG/DL (ref 65–117)
GLUCOSE BLD STRIP.AUTO-MCNC: 64 MG/DL (ref 65–117)
GLUCOSE SERPL-MCNC: 69 MG/DL (ref 65–100)
HBA1C MFR BLD: 5.5 % (ref 4–5.6)
HCT VFR BLD AUTO: 26 % (ref 35–47)
HDLC SERPL-MCNC: 58 MG/DL
HDLC SERPL: 2.4 (ref 0–5)
HGB BLD-MCNC: 8.3 G/DL (ref 11.5–16)
LDLC SERPL CALC-MCNC: 65.8 MG/DL (ref 0–100)
MAGNESIUM SERPL-MCNC: 1.7 MG/DL (ref 1.6–2.4)
MCH RBC QN AUTO: 31 PG (ref 26–34)
MCHC RBC AUTO-ENTMCNC: 31.9 G/DL (ref 30–36.5)
MCV RBC AUTO: 97 FL (ref 80–99)
NRBC # BLD: 0 K/UL (ref 0–0.01)
NRBC BLD-RTO: 0 PER 100 WBC
PHOSPHATE SERPL-MCNC: 2.5 MG/DL (ref 2.6–4.7)
PLATELET # BLD AUTO: 188 K/UL (ref 150–400)
PMV BLD AUTO: 10.9 FL (ref 8.9–12.9)
POTASSIUM SERPL-SCNC: 3 MMOL/L (ref 3.5–5.1)
RBC # BLD AUTO: 2.68 M/UL (ref 3.8–5.2)
SERVICE CMNT-IMP: ABNORMAL
SERVICE CMNT-IMP: NORMAL
SODIUM SERPL-SCNC: 144 MMOL/L (ref 136–145)
T4 FREE SERPL-MCNC: 1.5 NG/DL (ref 0.8–1.5)
TRIGL SERPL-MCNC: 66 MG/DL
TSH SERPL DL<=0.05 MIU/L-ACNC: 5.41 UIU/ML (ref 0.36–3.74)
VIT B12 SERPL-MCNC: 894 PG/ML (ref 193–986)
VLDLC SERPL CALC-MCNC: 13.2 MG/DL
WBC # BLD AUTO: 5.9 K/UL (ref 3.6–11)

## 2025-01-10 PROCEDURE — 6360000002 HC RX W HCPCS

## 2025-01-10 PROCEDURE — 84443 ASSAY THYROID STIM HORMONE: CPT

## 2025-01-10 PROCEDURE — 2500000003 HC RX 250 WO HCPCS

## 2025-01-10 PROCEDURE — 2580000003 HC RX 258

## 2025-01-10 PROCEDURE — 97161 PT EVAL LOW COMPLEX 20 MIN: CPT

## 2025-01-10 PROCEDURE — 97165 OT EVAL LOW COMPLEX 30 MIN: CPT

## 2025-01-10 PROCEDURE — 85027 COMPLETE CBC AUTOMATED: CPT

## 2025-01-10 PROCEDURE — 2500000003 HC RX 250 WO HCPCS: Performed by: PHYSICIAN ASSISTANT

## 2025-01-10 PROCEDURE — 82607 VITAMIN B-12: CPT

## 2025-01-10 PROCEDURE — 2060000000 HC ICU INTERMEDIATE R&B

## 2025-01-10 PROCEDURE — 82533 TOTAL CORTISOL: CPT

## 2025-01-10 PROCEDURE — 97535 SELF CARE MNGMENT TRAINING: CPT

## 2025-01-10 PROCEDURE — 70450 CT HEAD/BRAIN W/O DYE: CPT

## 2025-01-10 PROCEDURE — 82140 ASSAY OF AMMONIA: CPT

## 2025-01-10 PROCEDURE — 82962 GLUCOSE BLOOD TEST: CPT

## 2025-01-10 PROCEDURE — 84100 ASSAY OF PHOSPHORUS: CPT

## 2025-01-10 PROCEDURE — 83036 HEMOGLOBIN GLYCOSYLATED A1C: CPT

## 2025-01-10 PROCEDURE — 80048 BASIC METABOLIC PNL TOTAL CA: CPT

## 2025-01-10 PROCEDURE — 51798 US URINE CAPACITY MEASURE: CPT

## 2025-01-10 PROCEDURE — APPNB30 APP NON BILLABLE TIME 0-30 MINS: Performed by: NURSE PRACTITIONER

## 2025-01-10 PROCEDURE — 80061 LIPID PANEL: CPT

## 2025-01-10 PROCEDURE — 92610 EVALUATE SWALLOWING FUNCTION: CPT

## 2025-01-10 PROCEDURE — 36415 COLL VENOUS BLD VENIPUNCTURE: CPT

## 2025-01-10 PROCEDURE — 83735 ASSAY OF MAGNESIUM: CPT

## 2025-01-10 PROCEDURE — 97530 THERAPEUTIC ACTIVITIES: CPT

## 2025-01-10 PROCEDURE — 82746 ASSAY OF FOLIC ACID SERUM: CPT

## 2025-01-10 PROCEDURE — 84439 ASSAY OF FREE THYROXINE: CPT

## 2025-01-10 PROCEDURE — 2580000003 HC RX 258: Performed by: PHYSICIAN ASSISTANT

## 2025-01-10 PROCEDURE — 51701 INSERT BLADDER CATHETER: CPT

## 2025-01-10 RX ORDER — DEXTROSE MONOHYDRATE 100 MG/ML
INJECTION, SOLUTION INTRAVENOUS CONTINUOUS PRN
Status: DISCONTINUED | OUTPATIENT
Start: 2025-01-10 | End: 2025-01-16 | Stop reason: HOSPADM

## 2025-01-10 RX ORDER — MAGNESIUM SULFATE IN WATER 40 MG/ML
2000 INJECTION, SOLUTION INTRAVENOUS ONCE
Status: COMPLETED | OUTPATIENT
Start: 2025-01-10 | End: 2025-01-10

## 2025-01-10 RX ORDER — HYDROMORPHONE HYDROCHLORIDE 1 MG/ML
0.25 INJECTION, SOLUTION INTRAMUSCULAR; INTRAVENOUS; SUBCUTANEOUS EVERY 4 HOURS PRN
Status: DISCONTINUED | OUTPATIENT
Start: 2025-01-10 | End: 2025-01-16 | Stop reason: HOSPADM

## 2025-01-10 RX ORDER — LEVOTHYROXINE SODIUM 75 UG/1
150 TABLET ORAL DAILY
Status: DISCONTINUED | OUTPATIENT
Start: 2025-01-11 | End: 2025-01-16 | Stop reason: HOSPADM

## 2025-01-10 RX ORDER — MORPHINE SULFATE 20 MG/ML
2.5 SOLUTION ORAL EVERY 4 HOURS PRN
Status: DISCONTINUED | OUTPATIENT
Start: 2025-01-10 | End: 2025-01-16 | Stop reason: HOSPADM

## 2025-01-10 RX ORDER — AMIODARONE HYDROCHLORIDE 200 MG/1
200 TABLET ORAL DAILY
Status: DISCONTINUED | OUTPATIENT
Start: 2025-01-11 | End: 2025-01-16 | Stop reason: HOSPADM

## 2025-01-10 RX ADMIN — DEXTROSE MONOHYDRATE 125 ML: 100 INJECTION, SOLUTION INTRAVENOUS at 11:04

## 2025-01-10 RX ADMIN — HYDROMORPHONE HYDROCHLORIDE 0.25 MG: 1 INJECTION, SOLUTION INTRAMUSCULAR; INTRAVENOUS; SUBCUTANEOUS at 14:05

## 2025-01-10 RX ADMIN — MAGNESIUM SULFATE HEPTAHYDRATE 2000 MG: 40 INJECTION, SOLUTION INTRAVENOUS at 11:15

## 2025-01-10 RX ADMIN — NOREPINEPHRINE BITARTRATE 5 MCG/MIN: 64 SOLUTION INTRAVENOUS at 01:08

## 2025-01-10 RX ADMIN — SODIUM CHLORIDE, PRESERVATIVE FREE 10 ML: 5 INJECTION INTRAVENOUS at 23:19

## 2025-01-10 RX ADMIN — POTASSIUM PHOSPHATE, MONOBASIC AND POTASSIUM PHOSPHATE, DIBASIC 30 MMOL: 224; 236 INJECTION, SOLUTION, CONCENTRATE INTRAVENOUS at 07:51

## 2025-01-10 RX ADMIN — SODIUM CHLORIDE, PRESERVATIVE FREE 10 ML: 5 INJECTION INTRAVENOUS at 07:51

## 2025-01-10 RX ADMIN — FAMOTIDINE 20 MG: 10 INJECTION, SOLUTION INTRAVENOUS at 11:04

## 2025-01-10 ASSESSMENT — PAIN SCALES - PAIN ASSESSMENT IN ADVANCED DEMENTIA (PAINAD)
FACIALEXPRESSION: SMILING OR INEXPRESSIVE
CONSOLABILITY: NO NEED TO CONSOLE
FACIALEXPRESSION: SMILING OR INEXPRESSIVE
CONSOLABILITY: DISTRACTED OR REASSURED BY VOICE/TOUCH
FACIALEXPRESSION: SMILING OR INEXPRESSIVE
FACIALEXPRESSION: SAD, FRIGHTENED, FROWN
CONSOLABILITY: DISTRACTED OR REASSURED BY VOICE/TOUCH
BODYLANGUAGE: TENSE, DISTRESSED PACING, FIDGETING
CONSOLABILITY: NO NEED TO CONSOLE
BREATHING: NORMAL
BREATHING: OCCASIONAL LABORED BREATHING, SHORT PERIOD OF HYPERVENTILATION
TOTALSCORE: 0
BREATHING: NORMAL
NEGVOCALIZATION: OCCASIONAL MOAN/GROAN, LOW SPEECH, NEGATIVE/DISAPPROVING QUALITY
TOTALSCORE: 0
TOTALSCORE: 0
BODYLANGUAGE: TENSE, DISTRESSED PACING, FIDGETING
BREATHING: NORMAL
TOTALSCORE: 6
TOTALSCORE: 0
FACIALEXPRESSION: SMILING OR INEXPRESSIVE
BREATHING: NORMAL
CONSOLABILITY: NO NEED TO CONSOLE
CONSOLABILITY: NO NEED TO CONSOLE
FACIALEXPRESSION: SMILING OR INEXPRESSIVE
BREATHING: NORMAL
BREATHING: NORMAL
FACIALEXPRESSION: SMILING OR INEXPRESSIVE
BODYLANGUAGE: RELAXED
BODYLANGUAGE: RELAXED
CONSOLABILITY: NO NEED TO CONSOLE
BODYLANGUAGE: RELAXED
TOTALSCORE: 0
TOTALSCORE: 3
BODYLANGUAGE: RELAXED
BODYLANGUAGE: RELAXED
NEGVOCALIZATION: REPEATED TROUBLED CALLING OUT, LOUD MOANING/GROANING, CRYING

## 2025-01-10 NOTE — PROGRESS NOTES
Spiritual Health History and Assessment/Progress Note  [facility]    Palliative Care,  , Anticipatory Grief,      Name: Julee Mcclellan MRN: [id number]    Age: 89 y.o.     Sex: female   Language: English   Rastafari: Scientologist   Subdural hematoma     Date: 1/10/2025            Total Time Calculated: 40 min              Spiritual Assessment began in [facility] 7S1 INTENSIVE CARE        Referral/Consult From: Palliative Care   Encounter Overview/Reason: Palliative Care  Service Provided For: Family    Jenny, Belief, Meaning:   Patient unable to assess at this time  Family/Friends identify as spiritual, are connected with a jenny tradition or spiritual practice, and have beliefs or practices that help with coping during difficult times      Importance and Influence:  Patient unable to assess at this time  Family/Friends have no beliefs influential to healthcare decision-making identified during this visit    Community:  Patient Other:    Family/Friends are connected with a spiritual community: and feel well-supported. Support system includes: Children and Extended family    Assessment and Plan of Care:     Patient Interventions include: Other:    Family/Friends Interventions include: Facilitated expression of thoughts and feelings and Affirmed coping skills/support systems    Patient Plan of Care: Spiritual Care available upon further referral  Family/Friends Plan of Care: Spiritual Care available upon further referral    I accompanied palliative Dr Rust for a meeting of the family of Julee Mcclellan. Family members present: dtrs Tianna Purdy and pt/Tianna's caretaker.    The family will embrace hospice for the patient with details on when and where to be determined over the weekend.       Electronically signed by MARIUM THOMAS on 1/10/2025 at 4:01 PM

## 2025-01-10 NOTE — CONSULTS
and no longer recognizes family all the time.   She has been having very frequent falls at [place]. Also w/ poor spirits/depressed mood because she wants to go back to her house.   Current condition:   Understand that pt w/ a very small SDH, stable repeat CT. This could be contributing some to her change in mental status, but based on its very small size her presentation more likely due to her progressive dementia- and uncertain her condition is going to improve much at all.   NPO currently as too lethargic to work w/ SLP.  Goals of care:   Even before this fall that brought her into the hospital w/ SDH, discuss that I feel Hospice care is appropriate for her. They are familiar w/ the philosophy- Rajwinder's dtr is in the medical field in [place].   Family agrees that they do not want an NGT in patient- instead can offer bite/sips if more alert and accepting.   DNR/I already established earlier.   Plan for now:   Pt hemodynamically stable, off pressors and plan is for neuro floor once bed opens up.   Continue medical management for now- as primary mPOA/grandson Michael not getting back in town until tmrw and they want to talk more about Hospice care.   However, family all in agreement today that once on floor- no escalation of care back to ICU if declines, no NGT. If declines, they are aware that would move to comfort measures at that time.   Family needs to talk about WHERE hospice will take place.    Pain:   Has prn rectal tylenol, also adding very small dose morphine concentrate as pt had c/o pain in her face/eye where she fell and has some agitation possibly related to pain.   Discussed w/ Will Ricardo, Ericka RN  Please call with any palliative questions or concerns.  Palliative Care Team is available via perfect serve or via phone.    Referrals to:   [] Outpatient Palliative Care  [] Home Based Palliative Care  [] Home Based Primary Care  [x] Hospice       ADVANCE CARE PLANNING:   [] [facility]  Interdisciplinary Team has updated the ACP Navigator with Health Care Decision Maker and Patient Capacity      Primary Decision Maker: JUDAH BAXTER - Grandchild - [phone]    Secondary Decision Maker: Rajwinder Aponte - Child - [phone]       Current Code Status: DNR     Goals of Care: Goals of Care and Interventions  Patient/Health Care Proxy Stated Goals: Recovery from acute illness       Please refer to Palliative Medicine ACP notes for further details.    PALLIATIVE ASSESSMENT:      Palliative Performance Scale (PPS):  PPS: 20    Moving in bed some, trying to pull off GUMARO hose.       Modified ESAS:  Modified-[place] Symptom Assessment Scale (ESAS)  Pain Score: No pain  Anxiety Score: 3  Dyspnea Score: No shortness of breath    Clinical Pain Assessment (nonverbal scale for severity on nonverbal patients):   Clinical Pain Assessment  Severity: 0       NVPS:       RDOS:         Vital Signs: Blood pressure 129/71, pulse 65, temperature 98.3 °F (36.8 °C), resp. rate 20, weight 55.7 kg (122 lb 12.7 oz), SpO2 97%.    PHYSICAL ASSESSMENT:   General: [] Oriented x3  [x] Well appearing, bruising R eye   [] Intubated  []Ill appearing  []Other:  Mental Status: [] Normal mental status exam  [x] Drowsy  [] Confused  []Other:  Cardiovascular: [] Regular rate/rhythm  [] Arrhythmia  [] Other:  Chest: [] Effort normal  []Lungs clear  [] Respiratory distress  []Tachypnea  [] Other:  Abdomen: [] Soft/non-tender  [] Normal appearance  [] Distended  [] Ascites  [] Other:  Neurological: [] Normal speech  [] Normal sensation  []Deficits present:  Extremity: [] Normal skin color/temp  [] Clubbing/cyanosis  [] No edema  [] Other:    Wt Readings from Last 15 Encounters:   01/10/25 55.7 kg (122 lb 12.7 oz)   01/09/25 59 kg (130 lb)   11/16/24 52.8 kg (116 lb 6.5 oz)   08/06/24 59.5 kg (131 lb 3.2 oz)   07/20/24 57.4 kg (126 lb 8.7 oz)   04/24/24 58.1 kg (128 lb)   04/10/24 59 kg (130 lb)   03/26/24 59.1 kg (130 lb 6.4 oz)

## 2025-01-10 NOTE — PROGRESS NOTES
CRITICAL CARE  PROGRESS NOTE      Name: Julee Mcclellan   : 1931   MRN: [id number]   Date: 1/10/2025      REASON FOR ICU ADMISSION:  SDH     BRIEF PATIENT SUMMARY AND HOSPITAL COURSE   Julee Mcclellan is a 89 F w/ hx of Atrial fibrillation, HTN, dementia, anemia, MM, CKD presented to outside ED status post fall on 25. Per report patient had a witnessed fall at her memory care facility that morning. Workup in the ED revealed small 2mm right SDH. Case was discussed with neurosurgeon Dr. Velázquez who recommended transfer to [facility] for neuro ICU admission. Per family, patient has had worsening mental status and failure to thrive over the past month or so. She has been in and out of the hospital as well as rehab and other nursing/memory facilities but essentially mental status continues to decline. At time of my interview patient does not answer questions or follow commands. She does move all extremities. Confirmed with family she is DNR/DNI.     1/10: Patient briefly on levo overnight but off this morning. Additional family discussions held today and decision made to proceed with hospice. No plans for dobhoff/PEG tube, no escalation of care, DNR/DNI. Transfer out of ICU with further CM/palliative/hospice planning.     COMPREHENSIVE ASSESSMENT & PLAN     Subdural Hemorrhage  -Neurosurgery following  -AM Head CT with decreased SDH size  -no plans for neurosurgical intervention  -BP goal less than 140 for now  -q4h neuro checks  -recommend holding anti-platelet medications in this severely demented patient with increased fall risk     Altered mental status, Encephalopathy  -multifactorial, advanced dementia, multiple recent falls/TBI, failure to thrive  -UA, B12, folate negative negative   -ammonia slightly elevated but unable to take PO lactulose, family declining enteral access, rectal lactulose unlikely to change ultimate prognosis  -TSH elevated, continue home synthroid     Goals of  111* 117*   CO2 26 22   BUN 22 17   CREATININE 1.16* 0.88   GLUCOSE 111* 69   PHOS  --  2.5*   MG  --  1.7     No results for input(s): \"AST\", \"ALT\", \"BILIDIR\", \"BILITOT\", \"ALKPHOS\" in the last 72 hours.  Recent Labs     01/09/25  1251   PROTIME 13.6   INR 1.0       Medications: Reviewed 01/10/25    Imaging: Reviewed 01/10/25   Most Recent XR  XR CHEST PORTABLE 11/13/2024    Narrative  EXAM:  XR CHEST PORTABLE    INDICATION: eval infection    COMPARISON: August 2023    TECHNIQUE: portable chest AP view    FINDINGS: The cardiac silhouette is within normal limits. The pulmonary  vasculature is within normal limits.    The lungs and pleural spaces are clear. The visualized bones and upper abdomen  are age-appropriate.    Impression  No acute process on portable chest.      Electronically signed by Alistair Gross MD    Most Recent CT  CT HEAD WO CONTRAST 01/10/2025    Narrative  EXAM:  CT HEAD WO CONTRAST    INDICATION: Acute subdural hemorrhage    COMPARISON: CT 1/9/2025    TECHNIQUE: Noncontrast head CT. Coronal and sagittal reformats. CT dose  reduction was achieved through use of a standardized protocol tailored for this  examination and automatic exposure control for dose modulation.    FINDINGS:  There is decreased conspicuity of an acute right subdural hemorrhage measuring 1  to 2 mm in thickness. There is no midline shift or mass effect.    The ventricles are stable in size and configuration without acute hydrocephalus.  There is no new acute intracranial hemorrhage. There is no abnormal parenchymal  attenuation. The basal cisterns are patent.    The osseous structures are intact. The visualized paranasal sinuses and mastoid  air cells are clear.    Impression  Decreased conspicuity of a small acute right subdural hemorrhage measuring 1 to  2 mm in thickness. No midline shift.      Electronically signed by Daniel Downs    Most Recent MRI  MRI BRAIN WO CONTRAST 07/19/2024    Addendum 7/26/2024  4:51

## 2025-01-10 NOTE — PROGRESS NOTES
Neurosurgery    Repeat head CT this morning shows decrease in conspicuity of SDH. No surgical intervention. Ok to transfer from neurosurgical standpoint when medically stable. Recommend assessing continued need for a full strength ASA in a dementia patient who is wheelchair bound due to high fall risk from dementia. Will sign off and be available as needed.    ANN Ward NP

## 2025-01-10 NOTE — PROGRESS NOTES
PHYSICAL THERAPY EVALUATION/DISCHARGE    Patient: Julee Mcclellan (89 y.o. female)  Date: 1/10/2025  Primary Diagnosis: Subdural hematoma [S06.5XAA]       Precautions: Fall Risk                      ASSESSMENT AND RECOMMENDATIONS:  Patient is a 89 y/o female admitted from memory care unit at outside nursing facility s/p multiple GLF.  Patient found to have small R SDH.  Patient seen for PT evaluation this date with no active participation in session.  Patient maintaining eyes closed with no command following in session.  Transitioned into upright positioning and sitting EOB in efforts to assess for increased level of arousal and participation, but remaining with eyes closed throughout, stating \"my eye hurts,\" and nodding \"yes\" when asked if she wanted to return to laying down.  Patient requiring total A for all mobility with no volitional movements appreciated in session.  Based on the objective data below, the patient has no acute PT needs as patient is unable to actively participate due to severity of cognitive deficits.  PT will sign off.  Recommending discharge back to LTC facility vs hospice level care pending GOC discussions.      Functional Outcome Measure:  The patient scored 6 on the [facility] outcome measure.  Cutoff score <=171,2,3 had higher odds of discharging home with home health or need of SNF/IPR.      Further skilled acute physical therapy is not indicated at this time.       PLAN :  Recommendation for discharge: (in order for the patient to meet his/her long term goals):   No skilled physical therapy    Other factors to consider for discharge: no additional factors    IF patient discharges home will need the following DME: none       SUBJECTIVE:   Patient stated “My eye hurts.”  Minimally verbal with inconsistent responses in session    OBJECTIVE DATA SUMMARY:   Patient received supine in bed; RN cleared patient for participation in session.  On RA, on tele.   Past Medical History:   Diagnosis  Mobility:  Bed Mobility:     Bed Mobility Training  Bed Mobility Training: Yes  Rolling: Total assistance  Supine to Sit: Total assistance;Assist X2  Sit to Supine: Total assistance;Assist X2  Scooting: Total assistance  Transfers:     Transfer Training  Transfer Training: No  Balance:               Balance  Sitting: Impaired  Sitting - Static: Poor (constant support)  Sitting - Dynamic: Not tested  Ambulation/Gait Training:                       Gait  Gait Training: Yes                                                                                                                                                                                                                                                            [facility] AM-PAC®      Basic Mobility Inpatient Short Form (6-Clicks) Version 2    How much help is needed turning from your back to your side while in a flat bed without using bedrails?: Total  How much help is needed moving from lying on your back to sitting on the side of a flat bed without using bedrails?: Total  How much help is needed moving to and from a bed to a chair?: Total  How much help is needed standing up from a chair using your arms?: Total  How much help is needed walking in hospital room?: Total  How much help is needed climbing 3-5 steps with a railing?: Total    -PAC Inpatient Mobility Raw Score : 6  -PAC Inpatient T-Scale Score : 23.55     Cutoff score <=171,2,3 had higher odds of discharging home with home health or need of SNF/IPR.    1. Mildred Pride, Lisa Villasenor, Kang Sow, Jessie Goddard, Oswaldo Cummings, Amrit Pride.  Validity of the -PAC “6-Clicks” Inpatient Daily Activity and Basic Mobility Short Forms. Physical Therapy Mar 2014, 94 3) 379-391; DOI: 10.2522/ptj.63475191  2. Mynor WILSON, Sun PISANO, Mushtaq PISANO, Iliana PISANO. Association of AM-PAC \"6-Clicks\" Basic Mobility and Daily Activity Scores With Discharge Destination. Phys Ther. 2021 Apr  4;101(4):jrmh074. doi: 10.1093/ptj/fghz555. PMID: 17080329.  3. Gentry PISANO, Ben YEN, Hellen S, Deanna BARCLAY, Justa POPE. Activity Measure for Post-Acute Care \"6-Clicks\" Basic Mobility Scores Predict Discharge Destination After Acute Care Hospitalization in Select Patient Groups: A Retrospective, Observational Study. Arch Rehabil Res Clin Transl. 2022 Jul 16;4(3):019570. doi: 10.1016/j.arrct.2022.443675. PMID: 67075037; PMCID: [id number].  4. Shannen DENIS, Shantal S, Tae W, Brittany P. AM-PAC Short Forms Manual 4.0. Revised 2/2020.                                                                                                                                                                                                                             Pain:  C/o pain in eye--appears comfortable at rest    Activity Tolerance:   Poor; VSS throughout    After treatment:   Patient left in no apparent distress in bed, Call bell within reach, and Bed/ chair alarm activated      COMMUNICATION/EDUCATION:   The patient's plan of care was discussed with: occupational therapist and registered nurse    Patient Education  Education Given To: Patient  Education Provided: Role of Therapy;Plan of Care  Education Method: Verbal  Barriers to Learning: Cognition  Education Outcome: Unable to verbalize;Unable to demonstrate understanding    Thank you for this referral.  Meche Gardner PT  Minutes: 19      Physical Therapy Evaluation Charge Determination   History Examination Presentation Decision-Making   LOW Complexity : Zero comorbidities / personal factors that will impact the outcome / POC LOW Complexity : 1-2 Standardized tests and measures addressing body structure, function, activity limitation and / or participation in recreation  LOW Complexity : Stable, uncomplicated  AM-PAC  LOW      Based on the above components, the patient evaluation is determined to be of the following complexity level: Low

## 2025-01-10 NOTE — PLAN OF CARE
Speech LAnguage Pathology EVALUATION    Patient: Julee Mcclellan (89 y.o. female)  Date: 1/10/2025  Primary Diagnosis: Subdural hematoma [S06.5XAA]       Precautions:  Fall Risk                  ASSESSMENT :  SLP consulted due to stroke s/p fall at [facility]. CT head showed Acute right subdural hemorrhage measuring 2 mm, No midline shift. PMH includes advanced dementia with recent decline and failure to thrive. During evaluation, patient drowsy and demonstrated absent bolus acceptance. Although PO diet not recommended at this time due to lethargy, PEG is not recommended in patients with advanced dementia as it is not shown to improve quantity or quality of life. Note palliative following.     Patient will benefit from skilled intervention to address the above impairments.     PLAN :  Recommendations and Planned Interventions:  Diet: NPO due to lethargy  Agree with palliative consult   Do not recommend PEG given advanced dementia and decline occurring prior to admission     Recommend next SLP session: swallowing re-evaluation when alert    Acute SLP Services: SLP Plan of Care: 2 times/week. Patient's rehabilitation potential is considered to be Poor.  Discharge Recommendations: No, additional SLP treatment not indicated at discharge     SUBJECTIVE:   Patient with no verbalizations    OBJECTIVE:     Past Medical History:   Diagnosis Date    Benign essential hypertension     Breast cancer (HCC)     left side-2005    Breast cancer (HCC) 02/15/2023    left side-2005       Closed nondisplaced oblique fracture of shaft of right fibula with routine healing, subsequent encounter     Constipation in female     Elevated glucose     Elevated serum creatinine     Hypercholesteremia     Hyperthyroidism     Hypothyroidism, adult     Mild anemia     Sciatica of right side     Secondary hypercoagulable state (HCC) 09/05/2023    Smoldering multiple myeloma     TIA (transient ischemic attack)     05/2024 per family      Past Surgical History:   Procedure Laterality Date    CYST REMOVAL      2/2004    HYSTERECTOMY (CERVIX STATUS UNKNOWN)      age 68    IR ASP BONE MARROW  12/15/2017    Dr. Arana    MASTECTOMY Left     2005     Prior Level of Function/Home Situation:   Social/Functional History  Additional Comments: From LTC facility--[facility]    Baseline Assessment:  Current Diet : NPO     Prior Dysphagia History: seen by SLP at [facility] in 07/2024 with WFL swallow and recommendation for regular/thin liquid diet       Cognitive and Communication Status:  Neurologic State: Drowsy  Orientation Level: Unable to verbalize  Cognition: Decreased attention/concentration and No command following    Dysphagia:  Oral Assessment:  Oral Motor   Labial:  (unable to assess; no command following)  P.O. Trials:  PO Trials  Assessment Method(s): Observation  Patient Position: up in bed, leaning to L  Vocal Quality:  (no verbalizations)  Consistency Presented: Thin  How Presented: SLP-fed/Presented;Spoon  Bolus Acceptance: Impaired (lip smacking and licking when small amount of water placed on lips, but never opened mouth to accept spoon)            Motor Speech:         Language Comprehension and Expression:                   Neuro-Linguistics:                      Voice:       Respiratory Status/Airway:  Room air                         Outcome Measure:  Functional Oral Intake Scale (FOIS): 1--Nothing by mouth (NPO)    After treatment:   Patient left in no apparent distress in bed, Call bell left within reach, and Nursing notified    COMMUNICATION/EDUCATION:   Patient was educated regarding SLP role.  She demonstrated Poor understanding as evidenced by Limited understanding/response due to cognitive status. Therefore, further education not provided.    The patient's plan of care including recommendations, planned interventions, and recommended diet changes were discussed with: Registered nurse    Patient is

## 2025-01-10 NOTE — PROGRESS NOTES
Palliative Medicine   [place] [phone] (COPE)        [facility] [phone] (COPE)      Palliative Medicine SW and Dr. Rust met with patient at bedside- discussed w/ ICU MD and bedside RN. Patient does not arouse to us- has AMD on file naming daughter Rajwinder as primary MPOA.    Palliative Medicine placed call to Rajwinder, she shares she is on the way to [facility] - we plan to meet with her and family when they arrive at 12:00 p.m.     Addendum: No family yet at bedside- they confirmed with us they are coming, RN to page Palliative Medicine when family arrives to engage in Goals of care.       Thank you for including Palliative Medicine in the care of Julee Scherer LCSW

## 2025-01-10 NOTE — H&P
History and Physical  Consult for ICU Transfer    Date of Service:  1/10/2025  Primary Care Provider: Amanda Velasquez MD  Source of information: Chart review    Chief Complaint: Declining mental status, SDH    History of Presenting Illness:   1/9: Julee Mcclellan is a 89 y.o. female with a hx of Atrial fibrillation, HTN, dementia, anemia, MM and CKD who presented to outside ED (at [facility]) after a fall. Per report, patient had a witnessed fall at her memory care facility today. Workup in the ED revealed a small 2mm right SDH. Case was discussed with neurosurgeon Dr. Velázquez who recommended transfer to [facility] for neuro ICU admission. Per family, patient has had worsening mental status and failure to thrive over the past month or so, she has been in and out of the hospital as well as rehab and other nursing/memory facilities but essentially mental status continues to decline. At time of my admitting provider assessment, patient does not answer questions or follow commands but moves all extremities. Admitted to ICU.    1/10: CT scan stable, no surgical plan indicated and okay to transfer to floor. Due to progressive decline in health, palliative care team has been consulted and plan to meet with family today. Pt is already a DNR. Hospitalist team consulted for ICU transfer.  Patient was seen and examined this afternoon, she was lying in bed in no acute distress.  Patient is nonresponsive and not interactive with exam.  Multiple family members and caregivers were at bedside.  Unable to complete a review of systems at this time.    Discussed plan of care with patient's family, plan to transfer to neuro telemetry unit.  Patient has additionally family members coming into town tomorrow.  They have been given a list of agencies for hospice care.  Further discussions regarding hospice, hospice consult over the weekend as indicated.       REVIEW OF SYSTEMS:  As mentioned above in the HPI    Past Medical History:  patient's clinical care. Care coordination discussions were held with appropriate clinical/nonclinical/ nursing providers based on care coordination needs.     Assessment/Plan:     Subdural Hemorrhage  - Neurosurgery following, CT scan today showed decrease in conspicuity of SDH - okay with transfer to floor  - BP goal SBP <140     Altered mental status, Encephalopathy  - multifactorial, advanced dementia, multiple recent falls/TBI, failure to thrive  - UA with no indication of UTI  - ammonia  42  - B12 894  - [id number]  - TSH 5.41  - palliative care following     DIET: Diet NPO   ISOLATION PRECAUTIONS: No active isolations  CODE STATUS: DNR   Central Line:   none  DVT PROPHYLAXIS: SCDs    EMERGENCY CONTACT/SURROGATE DECISION MAKER: Rajwinder Aponte [phone]    CRITICAL CARE WAS PERFORMED FOR THIS ENCOUNTER: NO.    Signed By: VISHNU Dias - NP     January 10, 2025         Please note that this dictation may have been completed with Dragon, the computer voice recognition software.  Quite often unanticipated grammatical, syntax, homophones, and other interpretive errors are inadvertently transcribed by the computer software.  Please disregard these errors.  Please excuse any errors that have escaped final proofreading.

## 2025-01-10 NOTE — PROGRESS NOTES
Spiritual Health History and Assessment/Progress Note  [facility]    Palliative Care,  ,  ,      Name: Julee Mcclellan MRN: [id number]    Age: 89 y.o.     Sex: female   Language: English   Rastafari: Sikhism   Subdural hematoma     Date: 1/10/2025            Total Time Calculated: 15 min              Spiritual Assessment began in [facility] 7S1 INTENSIVE CARE        Referral/Consult From: Palliative Care   Encounter Overview/Reason: Palliative Care  Service Provided For: Patient not available    Jenny, Belief, Meaning:   Patient unable to assess at this time  Family/Friends No family/friends present      Importance and Influence:  Patient unable to assess at this time  Family/Friends No family/friends present    Community:  Patient Other:    Family/Friends No family/friends present    Assessment and Plan of Care:     Patient Interventions include: Other:    Family/Friends Interventions include: No family/friends present    Patient Plan of Care: Spiritual Care available upon further referral  Family/Friends Plan of Care: No future visits per patient/family request    I attempted to visit Julee Mcclellan for a palliative initial spiritual health assessment.     The patient was on vent support. No family present.     Electronically signed by MARIUM THOMAS on 1/10/2025 at 10:19 AM

## 2025-01-10 NOTE — CARE COORDINATION
Care Management Initial Assessment       RUR: 22%   Readmission? No  1st IM letter given? Yes -      01/10/25 5906   Service Assessment   Patient Orientation Unable to Assess  (Patient has dementia)   Cognition Severely Impaired   History Provided By Child/Family  (Daughter Rajwinder Aponte C: [phone])   Primary Caregiver Private caregiver   Support Systems Children   Patient's Healthcare Decision Maker is: Named in Scanned ACP Document   PCP Verified by CM Yes  (Dr Amanda Velasquez)   Last Visit to PCP Within last 6 months   Prior Functional Level Assistance with the following:;Bathing;Dressing;Toileting;Feeding;Mobility   Current Functional Level Assistance with the following:;Bathing;Dressing;Toileting;Feeding;Mobility   Can patient return to prior living arrangement Unknown at present   Ability to make needs known: Unable  (patient has dementia)   Financial Resources Medicare  (Medicare A,B)   Community Resources None   Social/Functional History   Lives With Family   Type of Home House   Home Layout Two level   Home Access Stairs to enter with rails   Entrance Stairs - Number of Steps 8   Entrance Stairs - Rails Both   Bathroom Shower/Tub Tub/Shower unit   Bathroom Toilet Standard   Bathroom Equipment Grab bars in shower   Home Equipment Wheelchair - Manual;Walker - Rolling   Receives Help From Family;Personal care attendant   Prior Level of Assist for ADLs Needs assistance   Bath Dependent/Total   Dressing Dependent/Total   Grooming Dependent/Total   Feeding Maximum assistance   Toileting Needs assistance   Prior Level of Assist for Homemaking Needs assistance   Homemaking Responsibilities No   Ambulation Assistance Non-ambulatory  (WC bound)   Active  No   Discharge Planning   Type of Residence House  (Home with Hospice)   Living Arrangements Family Members   Potential Assistance Purchasing Medications No   History of falls? 1     Patient presented to the ED s/p a witnessed fall at [facility] at

## 2025-01-10 NOTE — PROGRESS NOTES
Speech pathology brief note; full note to follow. Evaluation completed. Patient with lethargy that limited evaluation, however concern for overall decline and poor PO intake prior to admission per chart review. Recommend NPO while lethargic, and SLP to re-evaluate when alertness improves. Do not recommend PEG given advanced dementia. Thank you.    DONNA Van Ed., CCC-SLP

## 2025-01-10 NOTE — PROGRESS NOTES
2000 - Rectal Temp - 92.2. Samanta NP notified. Orders for Cyndi peoples  2130 - BP 89/39 (55). Samanta NP notified. 500cc LR bolus ordered.

## 2025-01-10 NOTE — PROGRESS NOTES
Spiritual Health History and Assessment/Progress Note  [facility]    Rituals, Rites and Sacraments,  ,  ,      Name: Julee Mcclellan MRN: [id number]    Age: 89 y.o.     Sex: female   Language: English   Zoroastrian: Jew   Subdural hematoma     Date: 1/10/2025            Total Time Calculated: 5 min              Spiritual Assessment began in [facility] 7S1 INTENSIVE CARE        Referral/Consult From: Clergy/   Encounter Overview/Reason: Rituals, Rites and Sacraments  Service Provided For: Patient    Jenny, Belief, Meaning:   Patient is connected with a jenny tradition or spiritual practice  Family/Friends No family/friends present      Importance and Influence:  Patient unable to assess at this time  Family/Friends No family/friends present    Community:  Patient Other: unable to assess  Family/Friends No family/friends present    Assessment and Plan of Care:     Patient Interventions include: Provided sacramental/Nondenominational ritual  Family/Friends Interventions include: No family/friends present    Patient Plan of Care: Spiritual Care available upon further referral  Family/Friends Plan of Care: Spiritual Care available upon further referral    Electronically signed by Chaplain MANUEL on 1/10/2025 at 2:30 PM     [facility].  Mrs. Mcclellan is Jew.  No family was present and she was asleep. Prayer for spiritual communion offered for her well-being.     Sr. RICHARD Chau, RN, ACSW, LCSW   Page:  [phone]

## 2025-01-11 PROCEDURE — 2500000003 HC RX 250 WO HCPCS

## 2025-01-11 PROCEDURE — 2060000000 HC ICU INTERMEDIATE R&B

## 2025-01-11 PROCEDURE — 2580000003 HC RX 258

## 2025-01-11 PROCEDURE — 6360000002 HC RX W HCPCS

## 2025-01-11 RX ADMIN — HYDROMORPHONE HYDROCHLORIDE 0.25 MG: 1 INJECTION, SOLUTION INTRAMUSCULAR; INTRAVENOUS; SUBCUTANEOUS at 07:09

## 2025-01-11 RX ADMIN — SODIUM CHLORIDE, PRESERVATIVE FREE 10 ML: 5 INJECTION INTRAVENOUS at 08:31

## 2025-01-11 RX ADMIN — HYDROMORPHONE HYDROCHLORIDE 0.25 MG: 1 INJECTION, SOLUTION INTRAMUSCULAR; INTRAVENOUS; SUBCUTANEOUS at 03:03

## 2025-01-11 RX ADMIN — SODIUM CHLORIDE, PRESERVATIVE FREE 10 ML: 5 INJECTION INTRAVENOUS at 20:28

## 2025-01-11 RX ADMIN — HYDROMORPHONE HYDROCHLORIDE 0.25 MG: 1 INJECTION, SOLUTION INTRAMUSCULAR; INTRAVENOUS; SUBCUTANEOUS at 15:48

## 2025-01-11 RX ADMIN — FAMOTIDINE 20 MG: 10 INJECTION, SOLUTION INTRAVENOUS at 08:28

## 2025-01-11 ASSESSMENT — PAIN SCALES - PAIN ASSESSMENT IN ADVANCED DEMENTIA (PAINAD)
BREATHING: NORMAL
NEGVOCALIZATION: OCCASIONAL MOAN/GROAN, LOW SPEECH, NEGATIVE/DISAPPROVING QUALITY
CONSOLABILITY: UNABLE TO CONSOLE, DISTRACT OR REASSURE
FACIALEXPRESSION: FACIAL GRIMACING
BODYLANGUAGE: RIGID, FISTS CLENCHED, KNEES UP, PUSHING/PULLING AWAY, STRIKES OUT
FACIALEXPRESSION: SMILING OR INEXPRESSIVE
TOTALSCORE: 0
TOTALSCORE: 8
CONSOLABILITY: NO NEED TO CONSOLE
BREATHING: OCCASIONAL LABORED BREATHING, SHORT PERIOD OF HYPERVENTILATION
BODYLANGUAGE: RELAXED

## 2025-01-11 ASSESSMENT — PAIN SCALES - GENERAL
PAINLEVEL_OUTOF10: 8
PAINLEVEL_OUTOF10: 0

## 2025-01-11 NOTE — PROGRESS NOTES
[facility] Adult  Hospitalist Group                                                                                          Hospitalist Progress Note  Brayden Hill MD  Office Phone: [phone]        Date of Service:  2025  NAME:  Julee Mcclellan  :  1931  MRN:  [id number]       Admission Summary:   : Julee Mcclellan is a 89 y.o. female with a hx of Atrial fibrillation, HTN, dementia, anemia, MM and CKD who presented to outside ED (at [facility]) after a fall. Per report, patient had a witnessed fall at her memory care facility today. Workup in the ED revealed a small 2mm right SDH. Case was discussed with neurosurgeon Dr. Velázquez who recommended transfer to [facility] for neuro ICU admission. Per family, patient has had worsening mental status and failure to thrive over the past month or so, she has been in and out of the hospital as well as rehab and other nursing/memory facilities but essentially mental status continues to decline. At time of my admitting provider assessment, patient does not answer questions or follow commands but moves all extremities. Admitted to ICU.     1/10: CT scan stable, no surgical plan indicated and okay to transfer to floor. Due to progressive decline in health, palliative care team has been consulted and plan to meet with family today. Pt is already a DNR. Hospitalist team consulted for ICU transfer.  Patient was seen and examined this afternoon, she was lying in bed in no acute distress.  Patient is nonresponsive and not interactive with exam.  Multiple family members and caregivers were at bedside.  Unable to complete a review of systems at this time.            Interval history / Subjective:   Lethargic, moans but incomprehensible.  She appears comfortable  No family at the bedside        Assessment & Plan:      Subdural Hemorrhage  - Neurosurgery following, CT scan today showed decrease in conspicuity of SDH - okay with transfer to  floor  -Not a candidate for surgical intervention  - BP goal SBP <140     Altered mental status, Encephalopathy  - multifactorial, advanced dementia, multiple recent falls/TBI, failure to thrive  - UA with no indication of UTI  - ammonia  42  - B12 894  - tcmojp31.3  - TSH 5.41  - palliative care following    Goals of care: Palliative medicine to discussed with family on the day of admission.  Family would not want escalation of care or ICU admission or NGT, if patient declines will move to comfort measures only.  Will need hospice eval         Code status: DNR  Prophylaxis: SCD  Care Plan discussed with: RN  Anticipated Disposition: TBD  Inpatient  Cardiac monitoring: Telemetry  Central Line:            Social Determinants of Health     Tobacco Use: Low Risk  (11/13/2024)    Patient History     Smoking Tobacco Use: Never     Smokeless Tobacco Use: Never     Passive Exposure: Not on file   Alcohol Use: Not At Risk (11/13/2024)    AUDIT-C     Frequency of Alcohol Consumption: Never     Average Number of Drinks: Patient does not drink     Frequency of Binge Drinking: Never   Financial Resource Strain: Low Risk  (4/10/2024)    Overall Financial Resource Strain (CARDIA)     Difficulty of Paying Living Expenses: Not hard at all   Food Insecurity: No Food Insecurity (11/13/2024)    Hunger Vital Sign     Worried About Running Out of Food in the Last Year: Never true     Ran Out of Food in the Last Year: Never true   Transportation Needs: No Transportation Needs (11/13/2024)    PRAPARE - Transportation     Lack of Transportation (Medical): No     Lack of Transportation (Non-Medical): No   Physical Activity: Insufficiently Active (4/10/2024)    Exercise Vital Sign     Days of Exercise per Week: 3 days     Minutes of Exercise per Session: 30 min   Stress: Not on file   Social Connections: Not on file   Intimate Partner Violence: Not on file   Depression: Not at risk (4/10/2024)    PHQ-2     PHQ-2 Score: 0   Housing Stability:  bolus 10% 250 mL  250 mL IntraVENous PRN    glucagon injection 1 mg  1 mg SubCUTAneous PRN    dextrose 10 % infusion   IntraVENous Continuous PRN    HYDROmorphone HCl PF (DILAUDID) injection 0.25 mg  0.25 mg IntraVENous Q4H PRN    morphine 20MG/ML concentrated solution 2.5 mg  2.5 mg Oral Q4H PRN    sodium chloride flush 0.9 % injection 5-40 mL  5-40 mL IntraVENous 2 times per day    sodium chloride flush 0.9 % injection 5-40 mL  5-40 mL IntraVENous PRN    0.9 % sodium chloride infusion   IntraVENous PRN    acetaminophen (TYLENOL) tablet 650 mg  650 mg Oral Q6H PRN    Or    acetaminophen (TYLENOL) suppository 650 mg  650 mg Rectal Q6H PRN    ondansetron (ZOFRAN-ODT) disintegrating tablet 4 mg  4 mg Oral Q8H PRN    Or    ondansetron (ZOFRAN) injection 4 mg  4 mg IntraVENous Q6H PRN     ______________________________________________________________________  EXPECTED LENGTH OF STAY: 7  ACTUAL LENGTH OF STAY:          2                 Brayden Hill MD

## 2025-01-11 NOTE — PLAN OF CARE
Problem: Discharge Planning  Goal: Discharge to home or other facility with appropriate resources  Outcome: Progressing  Flowsheets (Taken 1/10/2025 2000)  Discharge to home or other facility with appropriate resources:   Identify barriers to discharge with patient and caregiver   Arrange for needed discharge resources and transportation as appropriate   Identify discharge learning needs (meds, wound care, etc)   Arrange for interpreters to assist at discharge as needed   Refer to discharge planning if patient needs post-hospital services based on physician order or complex needs related to functional status, cognitive ability or social support system     Problem: Pain  Goal: Verbalizes/displays adequate comfort level or baseline comfort level  Outcome: Progressing     Problem: Safety - Adult  Goal: Free from fall injury  Outcome: Progressing  Flowsheets (Taken 1/10/2025 2000)  Free From Fall Injury:   Instruct family/caregiver on patient safety   Based on caregiver fall risk screen, instruct family/caregiver to ask for assistance with transferring infant if caregiver noted to have fall risk factors     Problem: Skin/Tissue Integrity  Goal: Absence of new skin breakdown  Description: 1.  Monitor for areas of redness and/or skin breakdown  2.  Assess vascular access sites hourly  3.  Every 4-6 hours minimum:  Change oxygen saturation probe site  4.  Every 4-6 hours:  If on nasal continuous positive airway pressure, respiratory therapy assess nares and determine need for appliance change or resting period.  Outcome: Progressing

## 2025-01-11 NOTE — PLAN OF CARE
Problem: Discharge Planning  Goal: Discharge to home or other facility with appropriate resources  1/11/2025 1209 by Sissy Diaz RN  Outcome: Progressing  Flowsheets (Taken 1/11/2025 0800)  Discharge to home or other facility with appropriate resources:   Identify barriers to discharge with patient and caregiver   Arrange for needed discharge resources and transportation as appropriate  1/11/2025 0345 by Brook Ramires RN  Outcome: Progressing  Flowsheets (Taken 1/10/2025 2000)  Discharge to home or other facility with appropriate resources:   Identify barriers to discharge with patient and caregiver   Arrange for needed discharge resources and transportation as appropriate   Identify discharge learning needs (meds, wound care, etc)   Arrange for interpreters to assist at discharge as needed   Refer to discharge planning if patient needs post-hospital services based on physician order or complex needs related to functional status, cognitive ability or social support system     Problem: Pain  Goal: Verbalizes/displays adequate comfort level or baseline comfort level  1/11/2025 1209 by Sissy Diaz RN  Outcome: Progressing  1/11/2025 0345 by Brook Ramires RN  Outcome: Progressing     Problem: Safety - Adult  Goal: Free from fall injury  1/11/2025 1209 by Sissy Diaz RN  Outcome: Progressing  Flowsheets (Taken 1/11/2025 0800)  Free From Fall Injury: Instruct family/caregiver on patient safety  1/11/2025 0345 by Brook Ramires RN  Outcome: Progressing  Flowsheets (Taken 1/10/2025 2000)  Free From Fall Injury:   Instruct family/caregiver on patient safety   Based on caregiver fall risk screen, instruct family/caregiver to ask for assistance with transferring infant if caregiver noted to have fall risk factors     Problem: Skin/Tissue Integrity  Goal: Absence of new skin breakdown  Description: 1.  Monitor for areas of redness and/or skin breakdown  2.  Assess vascular access sites hourly  3.   Every 4-6 hours minimum:  Change oxygen saturation probe site  4.  Every 4-6 hours:  If on nasal continuous positive airway pressure, respiratory therapy assess nares and determine need for appliance change or resting period.  1/11/2025 0345 by Brook Ramires RN  Outcome: Progressing

## 2025-01-12 LAB
ANION GAP SERPL CALC-SCNC: 7 MMOL/L (ref 2–12)
BUN SERPL-MCNC: 17 MG/DL (ref 6–20)
BUN/CREAT SERPL: 16 (ref 12–20)
CALCIUM SERPL-MCNC: 8.5 MG/DL (ref 8.5–10.1)
CHLORIDE SERPL-SCNC: 113 MMOL/L (ref 97–108)
CO2 SERPL-SCNC: 20 MMOL/L (ref 21–32)
CREAT SERPL-MCNC: 1.04 MG/DL (ref 0.55–1.02)
GLUCOSE BLD STRIP.AUTO-MCNC: 101 MG/DL (ref 65–117)
GLUCOSE BLD STRIP.AUTO-MCNC: 103 MG/DL (ref 65–117)
GLUCOSE BLD STRIP.AUTO-MCNC: 137 MG/DL (ref 65–117)
GLUCOSE BLD STRIP.AUTO-MCNC: 44 MG/DL (ref 65–117)
GLUCOSE BLD STRIP.AUTO-MCNC: 50 MG/DL (ref 65–117)
GLUCOSE BLD STRIP.AUTO-MCNC: 68 MG/DL (ref 65–117)
GLUCOSE BLD STRIP.AUTO-MCNC: 94 MG/DL (ref 65–117)
GLUCOSE SERPL-MCNC: 89 MG/DL (ref 65–100)
POTASSIUM SERPL-SCNC: 4.2 MMOL/L (ref 3.5–5.1)
SERVICE CMNT-IMP: ABNORMAL
SERVICE CMNT-IMP: NORMAL
SODIUM SERPL-SCNC: 140 MMOL/L (ref 136–145)

## 2025-01-12 PROCEDURE — 80048 BASIC METABOLIC PNL TOTAL CA: CPT

## 2025-01-12 PROCEDURE — 82962 GLUCOSE BLOOD TEST: CPT

## 2025-01-12 PROCEDURE — 36415 COLL VENOUS BLD VENIPUNCTURE: CPT

## 2025-01-12 PROCEDURE — 2500000003 HC RX 250 WO HCPCS

## 2025-01-12 PROCEDURE — 1100000000 HC RM PRIVATE

## 2025-01-12 PROCEDURE — 2580000003 HC RX 258

## 2025-01-12 PROCEDURE — 6360000002 HC RX W HCPCS

## 2025-01-12 RX ADMIN — DEXTROSE MONOHYDRATE: 100 INJECTION, SOLUTION INTRAVENOUS at 07:29

## 2025-01-12 RX ADMIN — DEXTROSE MONOHYDRATE 125 ML: 100 INJECTION, SOLUTION INTRAVENOUS at 06:56

## 2025-01-12 RX ADMIN — DEXTROSE MONOHYDRATE 125 ML: 100 INJECTION, SOLUTION INTRAVENOUS at 00:26

## 2025-01-12 RX ADMIN — SODIUM CHLORIDE, PRESERVATIVE FREE 10 ML: 5 INJECTION INTRAVENOUS at 20:58

## 2025-01-12 RX ADMIN — SODIUM CHLORIDE, PRESERVATIVE FREE 10 ML: 5 INJECTION INTRAVENOUS at 08:35

## 2025-01-12 RX ADMIN — FAMOTIDINE 20 MG: 10 INJECTION, SOLUTION INTRAVENOUS at 08:35

## 2025-01-12 RX ADMIN — HYDROMORPHONE HYDROCHLORIDE 0.25 MG: 1 INJECTION, SOLUTION INTRAMUSCULAR; INTRAVENOUS; SUBCUTANEOUS at 06:04

## 2025-01-12 RX ADMIN — HYDROMORPHONE HYDROCHLORIDE 0.25 MG: 1 INJECTION, SOLUTION INTRAMUSCULAR; INTRAVENOUS; SUBCUTANEOUS at 21:33

## 2025-01-12 ASSESSMENT — PAIN SCALES - PAIN ASSESSMENT IN ADVANCED DEMENTIA (PAINAD)
TOTALSCORE: 0
BODYLANGUAGE: TENSE, DISTRESSED PACING, FIDGETING
BREATHING: OCCASIONAL LABORED BREATHING, SHORT PERIOD OF HYPERVENTILATION
FACIALEXPRESSION: SMILING OR INEXPRESSIVE
FACIALEXPRESSION: FACIAL GRIMACING
NEGVOCALIZATION: OCCASIONAL MOAN/GROAN, LOW SPEECH, NEGATIVE/DISAPPROVING QUALITY
CONSOLABILITY: NO NEED TO CONSOLE
BODYLANGUAGE: RELAXED
BODYLANGUAGE: RELAXED
CONSOLABILITY: NO NEED TO CONSOLE
TOTALSCORE: 0
FACIALEXPRESSION: SMILING OR INEXPRESSIVE
BREATHING: NORMAL
BREATHING: NORMAL
CONSOLABILITY: DISTRACTED OR REASSURED BY VOICE/TOUCH
TOTALSCORE: 6

## 2025-01-12 ASSESSMENT — PAIN SCALES - GENERAL
PAINLEVEL_OUTOF10: 0
PAINLEVEL_OUTOF10: 6
PAINLEVEL_OUTOF10: 0
PAINLEVEL_OUTOF10: 6

## 2025-01-12 ASSESSMENT — PAIN DESCRIPTION - LOCATION: LOCATION: GENERALIZED

## 2025-01-12 NOTE — PROGRESS NOTES
[facility]  Hospitalist Group                                                                                          Hospitalist Progress Note  Brayden Hill MD  Office Phone: [phone]        Date of Service:  2025  NAME:  Julee Mcclellan  :  1931  MRN:  [id number]       Admission Summary:   : Julee Mcclellan is a 89 y.o. female with a hx of Atrial fibrillation, HTN, dementia, anemia, MM and CKD who presented to outside ED (at [place]) after a fall. Per report, patient had a witnessed fall at her memory care facility today. Workup in the ED revealed a small 2mm right SDH. Case was discussed with neurosurgeon Dr. Velázquez who recommended transfer to [facility] for neuro ICU admission. Per family, patient has had worsening mental status and failure to thrive over the past month or so, she has been in and out of the hospital as well as rehab and other nursing/memory facilities but essentially mental status continues to decline. At time of my admitting provider assessment, patient does not answer questions or follow commands but moves all extremities. Admitted to ICU.     1/10: CT scan stable, no surgical plan indicated and okay to transfer to floor. Due to progressive decline in health, palliative care team has been consulted and plan to meet with family today. Pt is already a DNR. Hospitalist team consulted for ICU transfer.  Patient was seen and examined this afternoon, she was lying in bed in no acute distress.  Patient is nonresponsive and not interactive with exam.  Multiple family members and caregivers were at bedside.  Unable to complete a review of systems at this time.            Interval history / Subjective:   Resting quietly, not responsive.  Her daughtes Rajwinder and Tianna at the bedside. Rajwinder is co-POA along with grandson Michael.  Discussed next steps with her daughters and Michael over the phone.  They are all in agreement for hospice.      Assessment  16 g  4 tablet Oral PRN    dextrose bolus 10% 125 mL  125 mL IntraVENous PRN    Or    dextrose bolus 10% 250 mL  250 mL IntraVENous PRN    glucagon injection 1 mg  1 mg SubCUTAneous PRN    dextrose 10 % infusion   IntraVENous Continuous PRN    HYDROmorphone HCl PF (DILAUDID) injection 0.25 mg  0.25 mg IntraVENous Q4H PRN    morphine 20MG/ML concentrated solution 2.5 mg  2.5 mg Oral Q4H PRN    sodium chloride flush 0.9 % injection 5-40 mL  5-40 mL IntraVENous 2 times per day    sodium chloride flush 0.9 % injection 5-40 mL  5-40 mL IntraVENous PRN    0.9 % sodium chloride infusion   IntraVENous PRN    acetaminophen (TYLENOL) tablet 650 mg  650 mg Oral Q6H PRN    Or    acetaminophen (TYLENOL) suppository 650 mg  650 mg Rectal Q6H PRN    ondansetron (ZOFRAN-ODT) disintegrating tablet 4 mg  4 mg Oral Q8H PRN    Or    ondansetron (ZOFRAN) injection 4 mg  4 mg IntraVENous Q6H PRN     ______________________________________________________________________  EXPECTED LENGTH OF STAY: 7  ACTUAL LENGTH OF STAY:          3                 Brayden Hill MD

## 2025-01-12 NOTE — PROGRESS NOTES
0015- decided to do Blood sugar check with patient being NPO and BS is now 44.      0026- Gave D10 125ml bolus.      0058- Repeat     0630-  BS check was 50.      0656- D10 125ml bolus given    0725- repeat BS 69    0728- started on PRN continuous D10 per order    Bedside and Verbal shift change report given to ANGIE Song (oncoming nurse) by ANGIE Friedman (offgoing nurse). Report included the following information SBAR, Kardex, ED Summary, MAR, and Cardiac Rhythm NSR.

## 2025-01-12 NOTE — PLAN OF CARE
Problem: Discharge Planning  Goal: Discharge to home or other facility with appropriate resources  Outcome: Progressing  Flowsheets (Taken 1/11/2025 2026)  Discharge to home or other facility with appropriate resources:   Identify barriers to discharge with patient and caregiver   Arrange for needed discharge resources and transportation as appropriate     Problem: Pain  Goal: Verbalizes/displays adequate comfort level or baseline comfort level  Outcome: Progressing     Problem: Safety - Adult  Goal: Free from fall injury  Outcome: Progressing     Problem: Skin/Tissue Integrity  Goal: Absence of new skin breakdown  Description: 1.  Monitor for areas of redness and/or skin breakdown  2.  Assess vascular access sites hourly  3.  Every 4-6 hours minimum:  Change oxygen saturation probe site  4.  Every 4-6 hours:  If on nasal continuous positive airway pressure, respiratory therapy assess nares and determine need for appliance change or resting period.  Outcome: Progressing

## 2025-01-12 NOTE — PLAN OF CARE
Problem: Discharge Planning  Goal: Discharge to home or other facility with appropriate resources  1/12/2025 0214 by Idalia Shaw RN  Outcome: Progressing  Flowsheets (Taken 1/11/2025 2026)  Discharge to home or other facility with appropriate resources:   Identify barriers to discharge with patient and caregiver   Arrange for needed discharge resources and transportation as appropriate     Problem: Pain  Goal: Verbalizes/displays adequate comfort level or baseline comfort level  1/12/2025 0214 by Idalia Shaw RN  Outcome: Progressing     Problem: Safety - Adult  Goal: Free from fall injury  1/12/2025 0214 by Idalia Shaw RN  Outcome: Progressing     Problem: Skin/Tissue Integrity  Goal: Absence of new skin breakdown  Description: 1.  Monitor for areas of redness and/or skin breakdown  2.  Assess vascular access sites hourly  3.  Every 4-6 hours minimum:  Change oxygen saturation probe site  4.  Every 4-6 hours:  If on nasal continuous positive airway pressure, respiratory therapy assess nares and determine need for appliance change or resting period.  1/12/2025 0214 by Idalia Shaw, RN  Outcome: Progressing

## 2025-01-13 PROCEDURE — 2580000003 HC RX 258

## 2025-01-13 PROCEDURE — 2500000003 HC RX 250 WO HCPCS

## 2025-01-13 PROCEDURE — 6360000002 HC RX W HCPCS

## 2025-01-13 PROCEDURE — 1100000000 HC RM PRIVATE

## 2025-01-13 PROCEDURE — 92526 ORAL FUNCTION THERAPY: CPT

## 2025-01-13 RX ADMIN — HYDROMORPHONE HYDROCHLORIDE 0.25 MG: 1 INJECTION, SOLUTION INTRAMUSCULAR; INTRAVENOUS; SUBCUTANEOUS at 02:40

## 2025-01-13 RX ADMIN — SODIUM CHLORIDE, PRESERVATIVE FREE 10 ML: 5 INJECTION INTRAVENOUS at 10:29

## 2025-01-13 RX ADMIN — FAMOTIDINE 20 MG: 10 INJECTION, SOLUTION INTRAVENOUS at 10:29

## 2025-01-13 ASSESSMENT — PAIN SCALES - PAIN ASSESSMENT IN ADVANCED DEMENTIA (PAINAD)
BODYLANGUAGE: TENSE, DISTRESSED PACING, FIDGETING
FACIALEXPRESSION: FACIAL GRIMACING
NEGVOCALIZATION: OCCASIONAL MOAN/GROAN, LOW SPEECH, NEGATIVE/DISAPPROVING QUALITY
CONSOLABILITY: DISTRACTED OR REASSURED BY VOICE/TOUCH
BREATHING: OCCASIONAL LABORED BREATHING, SHORT PERIOD OF HYPERVENTILATION
TOTALSCORE: 6

## 2025-01-13 ASSESSMENT — PAIN SCALES - GENERAL
PAINLEVEL_OUTOF10: 0
PAINLEVEL_OUTOF10: 6

## 2025-01-13 NOTE — PLAN OF CARE
Speech LAnguage Pathology TREATMENT    Patient: Julee Mcclellan (89 y.o. female)  Date: 1/13/2025  Primary Diagnosis: Subdural hematoma [S06.5XAA]       Precautions:  Fall Risk                  ASSESSMENT :  This patient has dementia and is very confused today and upset about money. Talking incessantly. She accepted purees and thins via straw. Needed extra time to close her lips on the straw. Cough noted once after a straw sip. If given very small sips via straw she showed no overt s/s of aspiration. She did masticate minced texture well. When given a cracker piece she talked with the food in her mouth and had residue on the L side of her mouth that had to be cleared out.   She is very confused and that increases her risk of aspiration. Due to her advanced age and dementia, feeding tubes are not appropriate. Recommend starting a pureed diet with small sips of thins via straw. (Did not hold the cup when offered). Meds crushed in applesauce     Patient will benefit from skilled intervention to address the above impairments.     PLAN :  Recommendations and Planned Interventions:  Diet: Puree  Thins by small straw sip.   Meds  crushed as needed     Recommend next SLP session: assess tolerance and for upgrade?     Acute SLP Services: Yes, SLP will continue to follow per plan of care.  Discharge Recommendations: Continue to assess pending progress     SUBJECTIVE:   Patient was talking incessantly and upset about money. Asking if someone gave me the money repeatedly     OBJECTIVE:     Past Medical History:   Diagnosis Date    Benign essential hypertension     Breast cancer (HCC)     left side-2005    Breast cancer (HCC) 02/15/2023    left side-2005       Closed nondisplaced oblique fracture of shaft of right fibula with routine healing, subsequent encounter     Constipation in female     Elevated glucose     Elevated serum creatinine     Hypercholesteremia     Hyperthyroidism     Hypothyroidism, adult     Mild anemia

## 2025-01-13 NOTE — PLAN OF CARE
Problem: Discharge Planning  Goal: Discharge to home or other facility with appropriate resources  Outcome: Progressing  Flowsheets (Taken 1/13/2025 0908)  Discharge to home or other facility with appropriate resources:   Identify barriers to discharge with patient and caregiver   Arrange for needed discharge resources and transportation as appropriate   Identify discharge learning needs (meds, wound care, etc)   Arrange for interpreters to assist at discharge as needed   Refer to discharge planning if patient needs post-hospital services based on physician order or complex needs related to functional status, cognitive ability or social support system     Problem: Pain  Goal: Verbalizes/displays adequate comfort level or baseline comfort level  Outcome: Progressing  Flowsheets (Taken 1/13/2025 0908)  Verbalizes/displays adequate comfort level or baseline comfort level:   Encourage patient to monitor pain and request assistance   Assess pain using appropriate pain scale   Implement non-pharmacological measures as appropriate and evaluate response   Notify Licensed Independent Practitioner if interventions unsuccessful or patient reports new pain   Consider cultural and social influences on pain and pain management   Administer analgesics based on type and severity of pain and evaluate response     Problem: Safety - Adult  Goal: Free from fall injury  Outcome: Progressing  Flowsheets (Taken 1/13/2025 0908)  Free From Fall Injury:   Instruct family/caregiver on patient safety   Based on caregiver fall risk screen, instruct family/caregiver to ask for assistance with transferring infant if caregiver noted to have fall risk factors     Problem: Skin/Tissue Integrity  Goal: Absence of new skin breakdown  Description: 1.  Monitor for areas of redness and/or skin breakdown  2.  Assess vascular access sites hourly  3.  Every 4-6 hours minimum:  Change oxygen saturation probe site  4.  Every 4-6 hours:  If on nasal  continuous positive airway pressure, respiratory therapy assess nares and determine need for appliance change or resting period.  Outcome: Progressing     Problem: Confusion  Goal: Confusion, delirium, dementia, or psychosis is improved or at baseline  Description: INTERVENTIONS:  1. Assess for possible contributors to thought disturbance, including medications, impaired vision or hearing, underlying metabolic abnormalities, dehydration, psychiatric diagnoses, and notify attending LIP  2. [place] high risk fall precautions, as indicated  3. Provide frequent short contacts to provide reality reorientation, refocusing and direction  4. Decrease environmental stimuli, including noise as appropriate  5. Monitor and intervene to maintain adequate nutrition, hydration, elimination, sleep and activity  6. If unable to ensure safety without constant attention obtain sitter and review sitter guidelines with assigned personnel  7. Initiate Psychosocial CNS and Spiritual Care consult, as indicated  Outcome: Progressing

## 2025-01-13 NOTE — CARE COORDINATION
Transition of Care Plan:    RUR: 16%   Prior Level of Functioning: Total Assist   Disposition: Home with Hospice   Hospice; pending   [facility]   Hospice Denied:   [facility]   Per conversation with the pt's daughter and Grandson via Phone;   This cm introduced self and reviewed over the DEVEN plan. Family confirmed that the goal is for the pt to transition back home with Home Hospice. This cm inquired if family had a preferred agency that they wanted to utilize. The pt's daughter reported that they received a list of Hospice agencies but was unfamiliar with the agencies. This cm reviewed over the list with family and family consented to this cm sending a referral to [facility] as the first choice and sending a referral to [facility] if [facility] Is unable to accommodate.   KHOA: Today   Follow up appointments: PCP   DME needed: None   Transportation at discharge: BLS  IM/IMM Medicare/ letter given: Received   Caregiver Contact:   Turner Apontea (Child) [phone]  JUDAH BAXTER Grandchild [phone]  Discharge Caregiver contacted prior to discharge? N/A   Care Conference needed? N/A   Barriers to discharge: Awaiting Hospice Meeting       01/13/25 1222   Discharge Planning   Patient expects to be discharged to: Hospice (comment)   Services At/After Discharge   Transition of Care Consult (CM Consult) Discharge Planning;Hospice   Internal Hospice Yes   Mode of Transport at Discharge BLS   Confirm Follow Up Transport Wheelchair Van   Condition of Participation: Discharge Planning   The Plan for Transition of Care is related to the following treatment goals: Hospice   The Patient and/or Patient Representative was provided with a Choice of Provider? Patient Representative   Name of the Patient Representative who was provided with the Choice of Provider and agrees with the Discharge Plan?  Aponte,Rajwinder Child [phone]  JUDAH BAXTERchild   [phone]   The Patient and/Or  Patient Representative agree with the Discharge Plan? Yes   Freedom of Choice list was provided with basic dialogue that supports the patient's individualized plan of care/goals, treatment preferences, and shares the quality data associated with the providers?  Yes

## 2025-01-13 NOTE — PROGRESS NOTES
[facility]  Good Help to Those in Need  [phone]     Patient Name: Julee Mcclellan  YOB: 1931  Age: 89 y.o.    [facility] RN Note:  Hospice consult received, reviewing chart. Will follow up with Unit Nurse and Care Manager to discuss plan of care, patient status and discharge disposition within the hour.     Thank you for the opportunity to be of service to this patient.     Carmen Cleaning RN  Clinical Nurse Liaison  [facility]   [phone] Mobile  [phone] Office   Available on Perfect Serve

## 2025-01-13 NOTE — PROGRESS NOTES
As I entered the room of Mrs. Mcclellan, Father Socorro,  of [facility], was completing the Anointing of the Sick for Mrs. Mcclellan. I told Father Socorro that I would record information.  Father Fantasma Marinelli

## 2025-01-13 NOTE — PROGRESS NOTES
[facility]  Hospitalist Group                                                                                          Hospitalist Progress Note  VIHSNU Dias NP  Office Phone: [phone]        Date of Service:  2025  NAME:  Julee Mcclellan  :  1931  MRN:  [id number]       Admission Summary:     : Julee Mcclellan is a 89 y.o. female with a hx of Atrial fibrillation, HTN, dementia, anemia, MM and CKD who presented to outside ED (at [place]) after a fall. Per report, patient had a witnessed fall at her memory care facility today. Workup in the ED revealed a small 2mm right SDH. Case was discussed with neurosurgeon Dr. Velázquez who recommended transfer to [facility] for neuro ICU admission. Per family, patient has had worsening mental status and failure to thrive over the past month or so, she has been in and out of the hospital as well as rehab and other   nursing/memory facilities but essentially mental status continues to decline. At time of my admitting provider assessment, patient does not answer questions or follow commands but moves all extremities. Admitted to ICU.     1/10: CT scan stable, no surgical plan indicated and okay to transfer to floor. Due to progressive decline in health, palliative care team has been consulted and plan to meet with family today. Pt is already a DNR. Hospitalist team consulted for ICU transfer.      : Goals of care: Family all in agreement for hospice. Family would not want escalation of care or ICU admission or NGT, if patient declines will move to comfort measures only.     Interval history / Subjective:     Patient was seen and examined this morning, she was lying in bed with her eyes closed in no acute distress.  For the most part, patient is not on verbal on exam this morning.  She shakes her head \"no\" when asked if she is having pain and shakes her head \"yes\" when asked if she was comfortable.    Assessment & Plan:

## 2025-01-13 NOTE — PROGRESS NOTES
Spiritual Health History and Assessment/Progress Note  [facility]    Rituals, Rites and Sacraments,  , Anticipatory Grief,      Name: Julee Mcclellan MRN: [id number]    Age: 89 y.o.     Sex: female   Language: English   Scientology: Amish   Subdural hematoma     Date: 1/13/2025            Total Time Calculated: 5 min              Spiritual Assessment continued in [facility] 5W1 ORTHO SPINE        Referral/Consult From: Clergy/   Encounter Overview/Reason: Rituals, Rites and Sacraments  Service Provided For: Patient    Jenny, Belief, Meaning:   Patient is connected with a jenny tradition or spiritual practice  Family/Friends No family/friends present      Importance and Influence:  Patient has spiritual/personal beliefs that influence decisions regarding their health  Family/Friends No family/friends present    Community:  Patient is connected with a spiritual community  Family/Friends No family/friends present    Assessment and Plan of Care:     Patient Interventions include: Provided sacramental/Jainism ritual  Family/Friends Interventions include: No family/friends present    Patient Plan of Care: Spiritual Care available upon further referral  Family/Friends Plan of Care: Spiritual Care available upon further referral    Electronically signed by Chaplain MANUEL on 1/13/2025 at 12:56 PM     [facility].  Mrs. Mcclellan is Amish. She was asleep and no family was present at the time of the visit. Prayer for spiritual communion offered at bedside.     Sr. RICHARD Chau, RN, ACSW, LCSW   Page:  [phone]

## 2025-01-14 PROBLEM — F03.C0 SEVERE DEMENTIA WITHOUT BEHAVIORAL DISTURBANCE, PSYCHOTIC DISTURBANCE, MOOD DISTURBANCE, OR ANXIETY (HCC): Status: ACTIVE | Noted: 2025-01-14

## 2025-01-14 PROBLEM — Z51.5 PALLIATIVE CARE ENCOUNTER: Status: ACTIVE | Noted: 2025-01-14

## 2025-01-14 PROCEDURE — 6370000000 HC RX 637 (ALT 250 FOR IP): Performed by: NURSE PRACTITIONER

## 2025-01-14 PROCEDURE — 92526 ORAL FUNCTION THERAPY: CPT

## 2025-01-14 PROCEDURE — 99231 SBSQ HOSP IP/OBS SF/LOW 25: CPT | Performed by: PHYSICAL MEDICINE & REHABILITATION

## 2025-01-14 PROCEDURE — 6370000000 HC RX 637 (ALT 250 FOR IP)

## 2025-01-14 PROCEDURE — 1100000000 HC RM PRIVATE

## 2025-01-14 RX ORDER — IPRATROPIUM BROMIDE AND ALBUTEROL SULFATE 2.5; .5 MG/3ML; MG/3ML
1 SOLUTION RESPIRATORY (INHALATION) EVERY 4 HOURS PRN
Status: DISCONTINUED | OUTPATIENT
Start: 2025-01-14 | End: 2025-01-16 | Stop reason: HOSPADM

## 2025-01-14 RX ORDER — FAMOTIDINE 20 MG/1
20 TABLET, FILM COATED ORAL DAILY
Status: DISCONTINUED | OUTPATIENT
Start: 2025-01-14 | End: 2025-01-16 | Stop reason: HOSPADM

## 2025-01-14 RX ADMIN — IPRATROPIUM BROMIDE AND ALBUTEROL SULFATE 1 DOSE: .5; 3 SOLUTION RESPIRATORY (INHALATION) at 23:03

## 2025-01-14 RX ADMIN — FAMOTIDINE 20 MG: 20 TABLET, FILM COATED ORAL at 09:39

## 2025-01-14 RX ADMIN — ACETAMINOPHEN 650 MG: 325 TABLET ORAL at 23:39

## 2025-01-14 NOTE — CARE COORDINATION
Transition of Care Plan:    RUR: 16%   Prior Level of Functioning: Total Assist   Disposition: Home with Hospice   Hospice; Accepted   [facility]   Able to admit to services tomorrow.   DME to be delivered late this evening vs. Tomorrow morning   Home Care Aide:  [employer]   Private Duty Nurse for 24hrs, Services. Care coordinated by family.   KHOA: Today   Follow up appointments: PCP   DME needed: None   Transportation at discharge: BLS  IM/IMM Medicare/ letter given: Received   Caregiver Contact:   Rajwinder Aponte (Child) [phone]  JUDAH BAXTER Grandchild [phone]  Discharge Caregiver contacted prior to discharge? N/A   Care Conference needed? N/A   Barriers to discharge: Admit to Hospice services in 24hrs.

## 2025-01-14 NOTE — PROGRESS NOTES
Palliative Medicine  Patient Name: Julee Mcclellan  YOB: 1931  MRN: [id number]  Age: 89 y.o.  Gender: female    Date of Initial Consult: 1/10/25  Date of Service: 1/14/2025  Time: 1:55 PM  Provider: Katherine Rust MD  Hospital Day: 6  Admit Date: 1/9/2025  Referring Provider: ONIEL Gallagher       Reasons for Consultation:  Goals of Care    HISTORY OF PRESENT ILLNESS (HPI):   Julee Mcclellan is a 89 y.o. female with a past medical history of breast cancer s/p mastectomy, TIA 2024, paroxysmal a fib who was admitted on 1/9/2025 from [place] at [facility] with witnessed ground level fall - was using her walker. Afterwards w/ AMS. Very small SDH, 2mm, no Nsgy intervention. Was hospitalized 11/2024 with AMS, metabolic encephalopathy.     Psychosocial: Pt had lived in her home w/ a caretaker - who also helps take care of her dtr Tianna. Other daughter is Rajwinder who lives  very close by.  AMD names johnson Rodriguez (Tianna's son) as primary and Rajwinder as secondary.       PALLIATIVE DIAGNOSES:    Dementia  Small SDH  Lethargy  Palliative care encounter     ASSESSMENT AND PLAN:   Meet w/ pt - she is more alert, speaking and answering some questions (when we ask if she wants applesauce or chocolate ice cream, she says ice cream- she loves sweet things). Not making sense completely and family notes that she has been talking about relatives who passed away years ago more often.   Plan is for Hospice at home, likely tmrw.   Discuss comfort diet, reiterating SLP recommendations.   Pt's sx managed at this time.   Available again as needed.   Please call with any palliative questions or concerns.  Palliative Care Team is available via perfect serve or via phone.    Referrals to:   [] Outpatient Palliative Care  [] Home Based Palliative Care  [] Home Based Primary Care  [x] Hospice       ADVANCE CARE PLANNING:   [] [facility] Interdisciplinary Team has updated the ACP Navigator with Health Care Decision  Maker and Patient Capacity      Primary Decision Maker: JUDAH BAXTER - Grandchild - [phone]    Secondary Decision Maker: Rajwinder Aponte - Child - [phone]  Confirm Advance Directive: Yes, on file    Current Code Status: DNR     Goals of Care: Goals of Care and Interventions  Patient/Health Care Proxy Stated Goals: Recovery from acute illness       Please refer to Palliative Medicine ACP notes for further details.    PALLIATIVE ASSESSMENT:      Palliative Performance Scale (PPS):  PPS: 20    \"Ice cream\"       Modified ESAS:  Modified-[place] Symptom Assessment Scale (ESAS)  Pain Score: No pain  Anxiety Score: 3  Dyspnea Score: No shortness of breath    Clinical Pain Assessment (nonverbal scale for severity on nonverbal patients):   Clinical Pain Assessment  Severity: 0       NVPS:  Adult Nonverbal Pain Scale (NVPS)  Face: No particular expression or smile  Activity (Movement): Laid quietly, normal position  Guarding: Lying quietly, no positioning of hands over areas of bod  Physiology (Vital Signs): Stable vital signs  Respiratory: Baseline RR/SpO2 compliant with ventilator  NVPS Score : 0    RDOS:         Vital Signs: Blood pressure (!) 151/67, pulse 66, temperature 97.9 °F (36.6 °C), temperature source Oral, resp. rate 17, weight 57.9 kg (127 lb 10.3 oz), SpO2 94%.    PHYSICAL ASSESSMENT:   General: [] Oriented x3  [x] Well appearing, bruising R eye   [] Intubated  []Ill appearing  []Other:  Mental Status: [] Normal mental status exam  [x] Drowsy  [] Confused  []Other:  Cardiovascular: [] Regular rate/rhythm  [] Arrhythmia  [] Other:  Chest: [] Effort normal  []Lungs clear  [] Respiratory distress  []Tachypnea  [] Other:  Abdomen: [] Soft/non-tender  [] Normal appearance  [] Distended  [] Ascites  [] Other:  Neurological: [] Normal speech  [] Normal sensation  []Deficits present:  Extremity: [] Normal skin color/temp  [] Clubbing/cyanosis  [] No edema  [] Other:    Wt Readings from Last 15 Encounters:

## 2025-01-14 NOTE — PLAN OF CARE
Problem: Discharge Planning  Goal: Discharge to home or other facility with appropriate resources  Outcome: Progressing  Flowsheets (Taken 1/14/2025 0756)  Discharge to home or other facility with appropriate resources:   Identify barriers to discharge with patient and caregiver   Arrange for needed discharge resources and transportation as appropriate   Identify discharge learning needs (meds, wound care, etc)   Arrange for interpreters to assist at discharge as needed   Refer to discharge planning if patient needs post-hospital services based on physician order or complex needs related to functional status, cognitive ability or social support system     Problem: Pain  Goal: Verbalizes/displays adequate comfort level or baseline comfort level  Outcome: Progressing     Problem: Safety - Adult  Goal: Free from fall injury  Outcome: Progressing  Flowsheets (Taken 1/14/2025 0756)  Free From Fall Injury:   Based on caregiver fall risk screen, instruct family/caregiver to ask for assistance with transferring infant if caregiver noted to have fall risk factors   Instruct family/caregiver on patient safety     Problem: Skin/Tissue Integrity  Goal: Absence of new skin breakdown  Description: 1.  Monitor for areas of redness and/or skin breakdown  2.  Assess vascular access sites hourly  3.  Every 4-6 hours minimum:  Change oxygen saturation probe site  4.  Every 4-6 hours:  If on nasal continuous positive airway pressure, respiratory therapy assess nares and determine need for appliance change or resting period.  Outcome: Progressing     Problem: SLP Adult - Impaired Swallowing  Goal: By Discharge: Advance to least restrictive diet without signs or symptoms of aspiration for planned discharge setting.  See evaluation for individualized goals.  Description: Speech pathology goals  Initiated 1/10/2025, Met 1/14/2025  1. Patient will participate in swallowing re-evaluation within 7 days  2. Patient will tolerate safest least  restrictive diet    1/14/2025 1055 by Idalia Lora, SLP  Outcome: Completed     Problem: Confusion  Goal: Confusion, delirium, dementia, or psychosis is improved or at baseline  Description: INTERVENTIONS:  1. Assess for possible contributors to thought disturbance, including medications, impaired vision or hearing, underlying metabolic abnormalities, dehydration, psychiatric diagnoses, and notify attending LIP  2. [place] high risk fall precautions, as indicated  3. Provide frequent short contacts to provide reality reorientation, refocusing and direction  4. Decrease environmental stimuli, including noise as appropriate  5. Monitor and intervene to maintain adequate nutrition, hydration, elimination, sleep and activity  6. If unable to ensure safety without constant attention obtain sitter and review sitter guidelines with assigned personnel  7. Initiate Psychosocial CNS and Spiritual Care consult, as indicated  Outcome: Progressing  Flowsheets (Taken 1/14/2025 0756)  Effect of thought disturbance (confusion, delirium, dementia, or psychosis) are managed with adequate functional status:   [place] high risk fall precautions, as indicated   Assess for contributors to thought disturbance, including medications, impaired vision or hearing, underlying metabolic abnormalities, dehydration, psychiatric diagnoses, notify LIP   Provide frequent short contacts to provide reality reorientation, refocusing and direction   Decrease environmental stimuli, including noise as appropriate   Monitor and intervene to maintain adequate nutrition, hydration, elimination, sleep and activity   If unable to ensure safety without constant attention obtain sitter and review sitter guidelines with assigned personnel   Initiate Psychosocial Clinical Nurse Specialist and Spiritual Care consult, as indicated

## 2025-01-14 NOTE — PLAN OF CARE
Speech LAnguage Pathology TREATMENT/DISCHARGE    Patient: Julee Mcclellan (89 y.o. female)  Date: 1/14/2025  Primary Diagnosis: Subdural hematoma [S06.5XAA]    Precautions: Aspiration, Fall Risk                  ASSESSMENT:  Therapy targeted dysphagia. Patient given thin liquids and solids for swallow re-assessment. Patient with intermittent cough response following thin liquids when large/sequential sips taken. Patient with improved overt tolerance of thin liquids when patient partially involved in self-feeding (e.g., with hand-over-hand assistance to bring water bottle to mouth). No overt s/s of aspiration with solids. Patient with slowed and possibly incomplete mastication of solids. At this level of care, recommend minced/moist diet with thin liquids with aspiration precautions listed as below. Note patient with possible plan for discharge home on hospice at which time PO diet can be liberalized to patient/family wishes.     Patient will be discharged from skilled speech-language pathology services at this time.     PLAN :  Recommendations and Planned Interventions:  Diet: Minced and moist and thin liquids  -Oral medications crushed in purees (Check with Pharmacy prior to crushing medications)  -Aspiration precautions: Upright position, small and single bites and sips, slow rate of intake  -Encourage involvement in self-feeding to improve awareness of bolus and readiness to swallow  -Oral care 2-3 times daily     Acute SLP Services: No, patient will be discharged from acute skilled speech-language pathology at this time.  Discharge Recommendations: No, additional SLP treatment not indicated at discharge     SUBJECTIVE:   Patient stated, “I'm trying to get out of here.”    OBJECTIVE:     Past Medical History:   Diagnosis Date    Benign essential hypertension     Breast cancer (HCC)     left side-2005    Breast cancer (HCC) 02/15/2023    left side-2005       Closed nondisplaced oblique fracture of shaft of

## 2025-01-14 NOTE — PROGRESS NOTES
[facility]                                                                                          Hospitalist Progress Note  VISHNU Dias NP  Office Phone: [phone]        Date of Service:  2025  NAME:  Julee Mcclellan  :  1931  MRN:  [id number]       Admission Summary:     : Julee Mcclellan is a 89 y.o. female with a hx of Atrial fibrillation, HTN, dementia, anemia, MM and CKD who presented to outside ED (at [place]) after a fall. Per report, patient had a witnessed fall at her memory care facility today. Workup in the ED revealed a small 2mm right SDH. Case was discussed with neurosurgeon Dr. Velázquez who recommended transfer to [facility] for neuro ICU admission. Per family, patient has had worsening mental status and failure to thrive over the past month or so, she has been in and out of the hospital as well as rehab and other   nursing/memory facilities but essentially mental status continues to decline. At time of my admitting provider assessment, patient does not answer questions or follow commands but moves all extremities. Admitted to ICU.     1/10: CT scan stable, no surgical plan indicated and okay to transfer to floor. Due to progressive decline in health, palliative care team has been consulted and plan to meet with family today. Pt is already a DNR. Hospitalist team consulted for ICU transfer.      : Goals of care: Family all in agreement for hospice. Family would not want escalation of care or ICU admission or NGT, if patient declines will move to comfort measures only.     Interval history / Subjective:     Patient was seen and examined this morning, she was lying in bed awake in no acute distress, pleasantly confused.  Noted that her breakfast tray was sitting beside her, fed her almost 100% of her meal -she excepted without difficulty while making small talk about things she did in the past.  Not oriented to time,  0.25 mg IntraVENous Q4H PRN    morphine 20MG/ML concentrated solution 2.5 mg  2.5 mg Oral Q4H PRN    sodium chloride flush 0.9 % injection 5-40 mL  5-40 mL IntraVENous PRN    0.9 % sodium chloride infusion   IntraVENous PRN    acetaminophen (TYLENOL) tablet 650 mg  650 mg Oral Q6H PRN    Or    acetaminophen (TYLENOL) suppository 650 mg  650 mg Rectal Q6H PRN    ondansetron (ZOFRAN-ODT) disintegrating tablet 4 mg  4 mg Oral Q8H PRN    Or    ondansetron (ZOFRAN) injection 4 mg  4 mg IntraVENous Q6H PRN   ______________________________________________________________________  EXPECTED LENGTH OF STAY: 5  ACTUAL LENGTH OF STAY:          5               VISHUN Dias NP

## 2025-01-14 NOTE — PROGRESS NOTES
Pharmacy Note - Renal dose adjustment made per P/T protocol    Original order:  Famotidine 20mg PO BID    Estimated Creatinine Clearance: 30 mL/min (A) (based on SCr of 1.04 mg/dL (H)).    Recent Labs     01/12/25  0309   BUN 17   CREATININE 1.04*       Renally adjusted order:  Famotidine 20mg PO daily    Please call pharmacy with any questions.    Thank you,  Urszula Jones [facility]  1/14/2025 8:20 AM

## 2025-01-15 PROCEDURE — 1100000000 HC RM PRIVATE

## 2025-01-15 PROCEDURE — 6370000000 HC RX 637 (ALT 250 FOR IP)

## 2025-01-15 RX ORDER — FAMOTIDINE 20 MG/1
20 TABLET, FILM COATED ORAL DAILY
Qty: 30 TABLET | Refills: 0 | Status: SHIPPED
Start: 2025-01-15

## 2025-01-15 RX ADMIN — ACETAMINOPHEN 650 MG: 325 TABLET ORAL at 22:25

## 2025-01-15 RX ADMIN — ACETAMINOPHEN 650 MG: 325 TABLET ORAL at 05:48

## 2025-01-15 ASSESSMENT — PAIN SCALES - GENERAL
PAINLEVEL_OUTOF10: 0
PAINLEVEL_OUTOF10: 0

## 2025-01-15 ASSESSMENT — PAIN DESCRIPTION - DESCRIPTORS: DESCRIPTORS: ACHING

## 2025-01-15 ASSESSMENT — PAIN DESCRIPTION - LOCATION: LOCATION: GENERALIZED

## 2025-01-15 NOTE — CARE COORDINATION
Transition of Care Plan:      RUR: 16%   Prior Level of Functioning: Total Assist   Disposition: Home with Hospice   Hospice; Accepted   [facility]   Update: DME will picked up by [facility] around 6pm and [facility] hosp[facility] DME will be delivered following around 7pm.   [facility] able to admit tomorrow at 12pm  Home Care Aide:  [facility]   Private Duty Nurse for 24hrs, Services. Care coordinated by family.   KHOA: 24hrs.   Follow up appointments: PCP   DME needed: None   Transportation at discharge: BLS/[facility] 11:45am Requested   IM/IMM Medicare/ letter given: Received   Caregiver Contact:   AponteRajwinder (Child) [phone]  JUDAH BAXTER Grandchild [phone]  Discharge Caregiver contacted prior to discharge? N/A   Care Conference needed? N/A   Barriers to discharge: Awaiting pickup and delivery of new DME

## 2025-01-15 NOTE — PROGRESS NOTES
Spiritual Health History and Assessment/Progress Note  [facility]    Rituals, Rites and Sacraments,  , Anticipatory Grief,      Name: Julee Mcclellan MRN: [id number]    Age: 89 y.o.     Sex: female   Language: English   Spiritism: Buddhism   Subdural hematoma     Date: 1/15/2025            Total Time Calculated: 5 min              Spiritual Assessment continued in [facility] 5W1 ORTHO SPINE        Referral/Consult From: Clergy/   Encounter Overview/Reason: Rituals, Rites and Sacraments  Service Provided For: Patient    Jenny, Belief, Meaning:   Patient is connected with a jenny tradition or spiritual practice  Family/Friends No family/friends present      Importance and Influence:  Patient has spiritual/personal beliefs that influence decisions regarding their health  Family/Friends No family/friends present    Community:  Patient is connected with a spiritual community  Family/Friends No family/friends present    Assessment and Plan of Care:     Patient Interventions include: Provided sacramental/Jain ritual  Family/Friends Interventions include: No family/friends present    Patient Plan of Care: Spiritual Care available upon further referral  Family/Friends Plan of Care: Spiritual Care available upon further referral    Electronically signed by Chaplain MANUEL on 1/15/2025 at 1:17 PM     [facility]. Mrs. Mcclellan was asleep in bed. No family was present at the time of the visit. Prayer for spiritual communion offered at bedside.     Sr. RICHARD Chau, RN, ACSW, LCSW   Page:  [phone]

## 2025-01-15 NOTE — PLAN OF CARE
Problem: Discharge Planning  Goal: Discharge to home or other facility with appropriate resources  1/14/2025 2142 by Aileen Solomon RN  Outcome: Progressing  1/14/2025 1600 by Janel Abad RN  Outcome: Progressing  Flowsheets (Taken 1/14/2025 0756)  Discharge to home or other facility with appropriate resources:   Identify barriers to discharge with patient and caregiver   Arrange for needed discharge resources and transportation as appropriate   Identify discharge learning needs (meds, wound care, etc)   Arrange for interpreters to assist at discharge as needed   Refer to discharge planning if patient needs post-hospital services based on physician order or complex needs related to functional status, cognitive ability or social support system     Problem: Pain  Goal: Verbalizes/displays adequate comfort level or baseline comfort level  1/14/2025 2142 by Aileen Solomon RN  Outcome: Progressing  1/14/2025 1600 by Janel Abad RN  Outcome: Progressing     Problem: Safety - Adult  Goal: Free from fall injury  1/14/2025 2142 by Aileen Solomon RN  Outcome: Progressing  1/14/2025 1600 by Janel Abad RN  Outcome: Progressing  Flowsheets (Taken 1/14/2025 0756)  Free From Fall Injury:   Based on caregiver fall risk screen, instruct family/caregiver to ask for assistance with transferring infant if caregiver noted to have fall risk factors   Instruct family/caregiver on patient safety     Problem: Skin/Tissue Integrity  Goal: Absence of new skin breakdown  Description: 1.  Monitor for areas of redness and/or skin breakdown  2.  Assess vascular access sites hourly  3.  Every 4-6 hours minimum:  Change oxygen saturation probe site  4.  Every 4-6 hours:  If on nasal continuous positive airway pressure, respiratory therapy assess nares and determine need for appliance change or resting period.  1/14/2025 1600 by Janel Abad RN  Outcome: Progressing     Problem: Confusion  Goal: Confusion,

## 2025-01-15 NOTE — DISCHARGE INSTRUCTIONS
Discharge Instructions       PATIENT ID: Julee Mcclellan  MRN: [id number]   YOB: 1931    DATE OF ADMISSION: 1/9/2025   DATE OF DISCHARGE: 1/15/2025    PRIMARY CARE PROVIDER: Amanda Velasquez     ATTENDING PHYSICIAN: Hilary Mccullough MD   DISCHARGING PROVIDER: VISHNU Bae NP    To contact this individual call [phone] and ask the  to page.   If unavailable ask to be transferred the Adult Hospitalist Department.    DISCHARGE DIAGNOSES subdural hemorrhage    CONSULTATIONS: Neurosurgery, Palliative Care, Hospice    PROCEDURES/SURGERIES: * No surgery found *    PENDING TEST RESULTS:   At the time of discharge the following test results are still pending: none    FOLLOW UP APPOINTMENTS:    Follow-up Information       Follow up With Specialties Details Why Contact [facility]  Follow up today              ADDITIONAL CARE RECOMMENDATIONS: Remainder of care per hospice.    DIET:   Diet: Minced and moist and thin liquids  -Oral medications crushed in purees (Check with Pharmacy prior to crushing medications)  -Aspiration precautions: Upright position, small and single bites and sips, slow rate of intake  -Encourage involvement in self-feeding to improve awareness of bolus and readiness to swallow  -Oral care 2-3 times daily     ACTIVITY: activity as tolerated    WOUND CARE: none    EQUIPMENT needed: none      DISCHARGE MEDICATIONS:   See Medication Reconciliation Form    It is important that you take the medication exactly as they are prescribed.   Keep your medication in the bottles provided by the pharmacist and keep a list of the medication names, dosages, and times to be taken in your wallet.   Do not take other medications without consulting your doctor.       NOTIFY YOUR PHYSICIAN FOR ANY OF THE FOLLOWING:   Fever over 101 degrees for 24 hours.   Chest pain, shortness of breath, fever, chills, nausea, vomiting, diarrhea, change in mentation, falling, weakness,

## 2025-01-15 NOTE — DISCHARGE SUMMARY
Discharge Summary       PATIENT ID: Julee Mcclellan  MRN: [id number]   YOB: 1931    DATE OF ADMISSION: 1/9/2025  3:41 PM    DATE OF DISCHARGE: 1/15/2025   PRIMARY CARE PROVIDER: Amanda Velasquez MD     ATTENDING PHYSICIAN: Dr. Mccullough  DISCHARGING PROVIDER: VISHNU Bae NP    To contact this individual call [phone] and ask the  to page.  If unavailable ask to be transferred the Adult Hospitalist Department.    CONSULTATIONS: IP CONSULT TO NEUROSURGERY  IP CONSULT TO CASE MANAGEMENT  IP CONSULT TO PALLIATIVE CARE  IP CONSULT TO CASE MANAGEMENT  IP CONSULT TO CASE MANAGEMENT  IP CONSULT TO CASE MANAGEMENT    PROCEDURES/SURGERIES: * No surgery found *     ADMITTING DIAGNOSES & HOSPITAL COURSE:   Per H&P: 1/9: Julee Mcclellan is a 89 y.o. female with a hx of Atrial fibrillation, HTN, dementia, anemia, MM and CKD who presented to outside ED (at [place]) after a fall. Per report, patient had a witnessed fall at her memory care facility today. Workup in the ED revealed a small 2mm right SDH. Case was discussed with neurosurgeon Dr. Velázquez who recommended transfer to [facility] for neuro ICU admission. Per family, patient has had worsening mental status and failure to thrive over the past month or so, she has been in and out of the hospital as well as rehab and other   nursing/memory facilities but essentially mental status continues to decline. At time of my admitting provider assessment, patient does not answer questions or follow commands but moves all extremities. Admitted to ICU.     1/10: CT scan stable, no surgical plan indicated and okay to transfer to floor. Due to progressive decline in health, palliative care team has been consulted and plan to meet with family today. Pt is already a DNR. Hospitalist team consulted for ICU transfer.       1/12: Goals of care: Family all in agreement for hospice. Family would not want escalation of care or ICU admission or NGT, if

## 2025-01-15 NOTE — PROGRESS NOTES
At about 0530, patient became restless and wanted to get out of the bed. At some point, she began to cry an ask for her mother. Patient was repositioned and medicated with tylenol in case she was in pain. Patient was offered apple sauce and she accepted. Patient was calm  for about an hour after taking the apple sauce. Monitoring in progress with virtual sitter.

## 2025-01-16 VITALS
OXYGEN SATURATION: 95 % | SYSTOLIC BLOOD PRESSURE: 151 MMHG | TEMPERATURE: 97.7 F | WEIGHT: 127.65 LBS | DIASTOLIC BLOOD PRESSURE: 68 MMHG | BODY MASS INDEX: 21.24 KG/M2 | HEART RATE: 70 BPM | RESPIRATION RATE: 16 BRPM

## 2025-01-16 PROCEDURE — 6370000000 HC RX 637 (ALT 250 FOR IP)

## 2025-01-16 RX ADMIN — ACETAMINOPHEN 650 MG: 325 TABLET ORAL at 06:30

## 2025-01-16 ASSESSMENT — PAIN SCALES - GENERAL: PAINLEVEL_OUTOF10: 0

## 2025-01-16 NOTE — DISCHARGE SUMMARY
Discharge Summary       PATIENT ID: Julee Mcclellan  MRN: [id number]   YOB: 1931    DATE OF ADMISSION: 1/9/2025  3:41 PM    DATE OF DISCHARGE: 1/16/2025   PRIMARY CARE PROVIDER: Amanda Velasquez MD     ATTENDING PHYSICIAN: Dr. Mccullough  DISCHARGING PROVIDER: VISHNU Bae NP    To contact this individual call [phone] and ask the  to page.  If unavailable ask to be transferred the Adult Hospitalist Department.    CONSULTATIONS: IP CONSULT TO NEUROSURGERY  IP CONSULT TO CASE MANAGEMENT  IP CONSULT TO PALLIATIVE CARE  IP CONSULT TO CASE MANAGEMENT  IP CONSULT TO CASE MANAGEMENT  IP CONSULT TO CASE MANAGEMENT    PROCEDURES/SURGERIES: * No surgery found *     ADMITTING DIAGNOSES & HOSPITAL COURSE:   Per H&P: 1/9: Julee Mcclellan is a 89 y.o. female with a hx of Atrial fibrillation, HTN, dementia, anemia, MM and CKD who presented to outside ED (at [place]) after a fall. Per report, patient had a witnessed fall at her memory care facility today. Workup in the ED revealed a small 2mm right SDH. Case was discussed with neurosurgeon Dr. Velázquez who recommended transfer to [facility] for neuro ICU admission. Per family, patient has had worsening mental status and failure to thrive over the past month or so, she has been in and out of the hospital as well as rehab and other   nursing/memory facilities but essentially mental status continues to decline. At time of my admitting provider assessment, patient does not answer questions or follow commands but moves all extremities. Admitted to ICU.     1/10: CT scan stable, no surgical plan indicated and okay to transfer to floor. Due to progressive decline in health, palliative care team has been consulted and plan to meet with family today. Pt is already a DNR. Hospitalist team consulted for ICU transfer.       1/12: Goals of care: Family all in agreement for hospice. Family would not want escalation of care or ICU admission or NGT, if  examined, lying in bed. Alert, but with minimal vocalization. Restless and with sitter at bedside.     General : alert, awake, no acute distress, minimal mumbled speech, restless and fidgeting with blanket  HEENT: PEERL, EOMI, moist mucus membranes  Neck: supple, no JVD, no meningeal signs  Chest: Clear to auscultation bilaterally, on room air  CVS: S1 S2 heard, Capillary refill less than 2 seconds  Abd: soft, non distended, BS physiological  Ext: no clubbing, no cyanosis, trace bilateral lower extremity edema, brisk 2+ DP pulses  Neuro/Psych: calm, appears comfortable, minimal command following  Skin: warm, dry    CHRONIC MEDICAL DIAGNOSES:  Principal Problem:    Subdural hematoma  Active Problems:    Severe dementia without behavioral disturbance, psychotic disturbance, mood disturbance, or anxiety ([facility])    Palliative care encounter  Resolved Problems:    * No resolved hospital problems. *        Greater than 31 minutes were spent with the patient on counseling and coordination of care    Signed:   VISHNU Bae NP  1/16/2025  10:15 AM

## 2025-01-16 NOTE — PLAN OF CARE
Problem: Discharge Planning  Goal: Discharge to home or other facility with appropriate resources  1/16/2025 1057 by Salud Shultz LPN  Outcome: Progressing  1/16/2025 0119 by Aileen Solomon RN  Outcome: Progressing     Problem: Safety - Adult  Goal: Free from fall injury  1/16/2025 1057 by Salud Shultz LPN  Outcome: Progressing  Flowsheets (Taken 1/16/2025 0900)  Free From Fall Injury:   Instruct family/caregiver on patient safety   Based on caregiver fall risk screen, instruct family/caregiver to ask for assistance with transferring infant if caregiver noted to have fall risk factors  1/16/2025 0119 by Aileen Solomon RN  Outcome: Progressing

## 2025-01-16 NOTE — PLAN OF CARE
Problem: Discharge Planning  Goal: Discharge to home or other facility with appropriate resources  1/16/2025 0119 by Aileen Solomon RN  Outcome: Progressing  1/15/2025 1331 by Salud Shultz LPN  Outcome: Progressing     Problem: Pain  Goal: Verbalizes/displays adequate comfort level or baseline comfort level  1/16/2025 0119 by Aileen Solomon RN  Outcome: Progressing  1/15/2025 1331 by Salud Shultz LPN  Outcome: Progressing     Problem: Safety - Adult  Goal: Free from fall injury  1/16/2025 0119 by Aileen Solomon RN  Outcome: Progressing  1/15/2025 1331 by Salud Shultz LPN  Outcome: Progressing  Flowsheets (Taken 1/15/2025 0900)  Free From Fall Injury:   Instruct family/caregiver on patient safety   Based on caregiver fall risk screen, instruct family/caregiver to ask for assistance with transferring infant if caregiver noted to have fall risk factors

## 2025-01-16 NOTE — CARE COORDINATION
Transition of Care Plan:      RUR: 16%   Prior Level of Functioning: Total Assist   Disposition: Home with Hospice   Hospice; Accepted   [facility]  [phone]     [facility] able to admit today at 12pm  Home Care Aide:  [facility] [employer]   Private Duty Nurse for 24hrs, Services. Care coordinated by family.   KHOA: Today  Follow up appointments: PCP   DME needed: None   Transportation at discharge: BLS/[facility] 11:45am Requested   IM/IMM Medicare/ letter given: Received   Caregiver Contact:   Rajwinder Aponte (Child) [phone]  JUDAH BAXTER Grandchild [phone]  Discharge Caregiver contacted prior to discharge? N/A   Care Conference needed? N/A

## 2025-01-17 ENCOUNTER — TELEPHONE (OUTPATIENT)
Facility: CLINIC | Age: 89
End: 2025-01-17

## 2025-01-17 NOTE — TELEPHONE ENCOUNTER
..Care Transitions Initial Follow Up Call    Outreach made within 2 business days of discharge: Yes    Patient: Julee Mcclellan Patient : [id number]   MRN: [id number]  Reason for Admission: Hematoma  Discharge Date: 25       Spoke with: CAREY    Discharge department/facility:      [facility] Interactive Patient Contact:  Was patient able to fill all prescriptions:      Was patient instructed to bring all medications to the follow-up visit:      Is patient taking all medications as directed in the discharge summary?    Does patient understand their discharge instructions:    Does patient have questions or concerns that need addressed prior to 7-14 day follow up office visit:      Additional needs identified to be addressed with provider  No needs identified             Scheduled appointment with PCP within 7-14 days    Follow Up  No future appointments.    Kasey Adams MA

## 2025-01-20 VITALS
OXYGEN SATURATION: 95 % | BODY MASS INDEX: 21.24 KG/M2 | HEART RATE: 70 BPM | SYSTOLIC BLOOD PRESSURE: 151 MMHG | WEIGHT: 127.65 LBS | RESPIRATION RATE: 16 BRPM | DIASTOLIC BLOOD PRESSURE: 68 MMHG | TEMPERATURE: 97.7 F

## (undated) LAB
ALBUMIN SERPL-MCNC: 3.9 G/DL (ref 3.5–4.6)
ALBUMIN/GLOB SERPL: 1.1 {RATIO} (ref 1.2–2.2)
ALP SERPL-CCNC: 72 IU/L (ref 44–121)
ALT SERPL-CCNC: 10 IU/L (ref 0–32)
AST SERPL-CCNC: 22 IU/L (ref 0–40)
BILIRUB SERPL-MCNC: 0.4 MG/DL (ref 0–1.2)
BUN SERPL-MCNC: 15 MG/DL (ref 10–36)
BUN/CREAT SERPL: 12 (ref 12–28)
CALCIUM SERPL-MCNC: 9.3 MG/DL (ref 8.7–10.3)
CHLORIDE SERPL-SCNC: 101 MMOL/L (ref 96–106)
CHOLEST SERPL-MCNC: 261 MG/DL (ref 100–199)
CO2 SERPL-SCNC: 25 MMOL/L (ref 20–29)
CREAT SERPL-MCNC: 1.28 MG/DL (ref 0.57–1)
EGFRCR SERPLBLD CKD-EPI 2021: 40 ML/MIN/1.73
GLOBULIN SER CALC-MCNC: 3.5 G/DL (ref 1.5–4.5)
GLUCOSE SERPL-MCNC: 89 MG/DL (ref 70–99)
HDLC SERPL-MCNC: 71 MG/DL
LDLC SERPL CALC-MCNC: 171 MG/DL (ref 0–99)
POTASSIUM SERPL-SCNC: 4.3 MMOL/L (ref 3.5–5.2)
PROT SERPL-MCNC: 7.4 G/DL (ref 6–8.5)
SODIUM SERPL-SCNC: 140 MMOL/L (ref 134–144)
TRIGL SERPL-MCNC: 109 MG/DL (ref 0–149)
TSH SERPL DL<=0.005 MIU/L-ACNC: 70.5 UIU/ML (ref 0.45–4.5)
VLDLC SERPL CALC-MCNC: 19 MG/DL (ref 5–40)